# Patient Record
Sex: FEMALE | Race: BLACK OR AFRICAN AMERICAN | NOT HISPANIC OR LATINO | Employment: OTHER | URBAN - METROPOLITAN AREA
[De-identification: names, ages, dates, MRNs, and addresses within clinical notes are randomized per-mention and may not be internally consistent; named-entity substitution may affect disease eponyms.]

---

## 2018-10-02 ENCOUNTER — OFFICE VISIT (OUTPATIENT)
Dept: INTERNAL MEDICINE | Facility: CLINIC | Age: 68
End: 2018-10-02
Payer: MEDICARE

## 2018-10-02 ENCOUNTER — LAB VISIT (OUTPATIENT)
Dept: LAB | Facility: HOSPITAL | Age: 68
End: 2018-10-02
Attending: INTERNAL MEDICINE
Payer: MEDICARE

## 2018-10-02 VITALS
WEIGHT: 131.63 LBS | HEART RATE: 94 BPM | BODY MASS INDEX: 22.47 KG/M2 | DIASTOLIC BLOOD PRESSURE: 68 MMHG | HEIGHT: 64 IN | SYSTOLIC BLOOD PRESSURE: 130 MMHG | OXYGEN SATURATION: 98 %

## 2018-10-02 DIAGNOSIS — E78.00 PURE HYPERCHOLESTEROLEMIA: ICD-10-CM

## 2018-10-02 DIAGNOSIS — Z23 NEED FOR INFLUENZA VACCINATION: ICD-10-CM

## 2018-10-02 DIAGNOSIS — E55.9 VITAMIN D INSUFFICIENCY: ICD-10-CM

## 2018-10-02 DIAGNOSIS — Z00.00 ANNUAL PHYSICAL EXAM: ICD-10-CM

## 2018-10-02 DIAGNOSIS — G43.909 MIGRAINE SYNDROME: ICD-10-CM

## 2018-10-02 DIAGNOSIS — Z78.0 ASYMPTOMATIC POSTMENOPAUSAL ESTROGEN DEFICIENCY: ICD-10-CM

## 2018-10-02 DIAGNOSIS — Z12.31 BREAST CANCER SCREENING BY MAMMOGRAM: ICD-10-CM

## 2018-10-02 DIAGNOSIS — H04.123 CHRONIC DRYNESS OF BOTH EYES: ICD-10-CM

## 2018-10-02 DIAGNOSIS — Z12.11 COLON CANCER SCREENING: ICD-10-CM

## 2018-10-02 DIAGNOSIS — Z00.00 ANNUAL PHYSICAL EXAM: Primary | ICD-10-CM

## 2018-10-02 LAB
25(OH)D3+25(OH)D2 SERPL-MCNC: 59 NG/ML
ALBUMIN SERPL BCP-MCNC: 4 G/DL
ALP SERPL-CCNC: 127 U/L
ALT SERPL W/O P-5'-P-CCNC: 23 U/L
ANION GAP SERPL CALC-SCNC: 7 MMOL/L
AST SERPL-CCNC: 23 U/L
BILIRUB SERPL-MCNC: 0.4 MG/DL
BUN SERPL-MCNC: 7 MG/DL
CALCIUM SERPL-MCNC: 9.9 MG/DL
CHLORIDE SERPL-SCNC: 105 MMOL/L
CHOLEST SERPL-MCNC: 164 MG/DL
CHOLEST/HDLC SERPL: 3.4 {RATIO}
CO2 SERPL-SCNC: 27 MMOL/L
CREAT SERPL-MCNC: 1 MG/DL
EST. GFR  (AFRICAN AMERICAN): >60 ML/MIN/1.73 M^2
EST. GFR  (NON AFRICAN AMERICAN): 59.3 ML/MIN/1.73 M^2
GLUCOSE SERPL-MCNC: 116 MG/DL
HDLC SERPL-MCNC: 48 MG/DL
HDLC SERPL: 29.3 %
LDLC SERPL CALC-MCNC: 103.8 MG/DL
NONHDLC SERPL-MCNC: 116 MG/DL
POTASSIUM SERPL-SCNC: 4 MMOL/L
PROT SERPL-MCNC: 7.2 G/DL
SODIUM SERPL-SCNC: 139 MMOL/L
TRIGL SERPL-MCNC: 61 MG/DL

## 2018-10-02 PROCEDURE — 36415 COLL VENOUS BLD VENIPUNCTURE: CPT | Mod: PO

## 2018-10-02 PROCEDURE — 80061 LIPID PANEL: CPT

## 2018-10-02 PROCEDURE — 99999 PR PBB SHADOW E&M-NEW PATIENT-LVL V: CPT | Mod: PBBFAC,,, | Performed by: INTERNAL MEDICINE

## 2018-10-02 PROCEDURE — 99203 OFFICE O/P NEW LOW 30 MIN: CPT | Mod: 25,S$PBB,, | Performed by: INTERNAL MEDICINE

## 2018-10-02 PROCEDURE — 90662 IIV NO PRSV INCREASED AG IM: CPT | Mod: PBBFAC,PO

## 2018-10-02 PROCEDURE — 82306 VITAMIN D 25 HYDROXY: CPT

## 2018-10-02 PROCEDURE — 99205 OFFICE O/P NEW HI 60 MIN: CPT | Mod: PBBFAC,PO,25 | Performed by: INTERNAL MEDICINE

## 2018-10-02 PROCEDURE — G0008 ADMIN INFLUENZA VIRUS VAC: HCPCS | Mod: PBBFAC,PO

## 2018-10-02 PROCEDURE — 80053 COMPREHEN METABOLIC PANEL: CPT

## 2018-10-02 RX ORDER — ACETAMINOPHEN 500 MG
1 TABLET ORAL DAILY
COMMUNITY

## 2018-10-02 RX ORDER — DEXAMETHASONE 4 MG/1
4 TABLET ORAL
COMMUNITY
End: 2018-11-12 | Stop reason: SDUPTHER

## 2018-10-02 RX ORDER — VENLAFAXINE HYDROCHLORIDE 75 MG/1
75 CAPSULE, EXTENDED RELEASE ORAL DAILY
COMMUNITY
End: 2019-01-21 | Stop reason: SDUPTHER

## 2018-10-02 RX ORDER — VENLAFAXINE HYDROCHLORIDE 150 MG/1
75 CAPSULE, EXTENDED RELEASE ORAL DAILY
COMMUNITY
End: 2019-01-21 | Stop reason: SDUPTHER

## 2018-10-02 RX ORDER — ATORVASTATIN CALCIUM 40 MG/1
40 TABLET, FILM COATED ORAL DAILY
COMMUNITY
End: 2018-11-19 | Stop reason: SDUPTHER

## 2018-10-02 RX ORDER — FROVATRIPTAN SUCCINATE 2.5 MG/1
2.5 TABLET, FILM COATED ORAL
COMMUNITY
End: 2018-11-12 | Stop reason: SDUPTHER

## 2018-10-02 NOTE — PROGRESS NOTES
Portions of this note are generated with voice recognition software. Typographical errors may exist.       Patient Name:ABIOLA ACOSTA  Patient MRN:   [id number]    History of Present Illness   ================================================================  ABIOLA ACOSTA is a 68 y.o. female here for primary care visit for  Chief Complaint   Patient presents with    Establish Care       History reviewed. No pertinent past medical history.    History reviewed. No pertinent surgical history.    Review of patient's allergies indicates:   Allergen Reactions    Aspirin Other (See Comments)     Causes bruising         Current Outpatient Medications on File Prior to Visit   Medication Sig Dispense Refill    atorvastatin (LIPITOR) 40 MG tablet Take 40 mg by mouth once daily.      cholecalciferol, vitamin D3, (VITAMIN D3) 2,000 unit Cap Take 1 capsule by mouth once daily.      dexamethasone (DECADRON) 4 MG Tab Take 4 mg by mouth. Take 1 tablet every 8 hrs for headache. Max 3 days      frovatriptan (FROVA) 2.5 MG tablet Take 2.5 mg by mouth as needed for Migraine. If recurs, may repeat after 2 hours. Max of 3 tabs in 24 hours.      venlafaxine (EFFEXOR-XR) 150 MG Cp24 Take 75 mg by mouth once daily.      venlafaxine (EFFEXOR-XR) 75 MG 24 hr capsule Take 75 mg by mouth once daily.       No current facility-administered medications on file prior to visit.        History reviewed. No pertinent family history.    Social History     Socioeconomic History    Marital status: Single     Spouse name: Not on file    Number of children: Not on file    Years of education: Not on file    Highest education level: Not on file   Social Needs    Financial resource strain: Not on file    Food insecurity - worry: Not on file    Food insecurity - inability: Not on file    Transportation needs - medical: Not on file    Transportation needs - non-medical: Not on file   Occupational History    Not on file   Tobacco Use     Smoking status: Never Smoker    Smokeless tobacco: Never Used   Substance and Sexual Activity    Alcohol use: No     Frequency: Never    Drug use: No    Sexual activity: No   Other Topics Concern    Not on file   Social History Narrative    Not on file       Social History     Substance and Sexual Activity   Sexual Activity No         SUBJECTIVE:    The patient states that she get the majority of her primary care in [place] and at snf she decided to move closer to home in the [facility] where she is originally from.    She states that she is due for colonoscopy and mammogram this year.  Mammograms have previously been normal.  She has some densities but they have never required ultrasound or biopsy.  Colonoscopy had some polyps and she was told to repeat in about 5 years    The patient states that she has been evaluated by neurologist to help with recurring painful headache syndromes.  She has been diagnosed with migraine syndrome with frequent recurrences.  She states that she has been managed on both long-term suppressive therapy in addition to acute therapy.  She has been on about 225 mg of venlafaxine for some time to help to prevent frequent headaches.  The patient appears to be tolerating the medication well.  In addition she has a Triptan prescription and she has a dexamethasone prescription.  The patient states that she takes the dexamethasone very rarely but that it does help with at least 1 of her headaches syndromes.  She takes the dexamethasone potentially every 2-3 months and usually not completing the full prescription of 3 days of treatment.    The patient has a worrisome history of long bone fracture.  She states that as a result of some orthostatic dizziness that might of been a medication side effect she had a ground level fall at low velocity and suffered a fracture of 2 of her bones at the elbow on the right side.  The patient states that her last bone density evaluation was 5  years ago and she did not feel that this was an abnormal bone density.  She has taken vitamin-D supplementation for chronic vitamin D deficiency.    The patient suffers with chronic dry eye.  It has not been determine if there is a medication component to this.  She follows with optometry.    The patient is been on atorvastatin for many years.  She had difficulty with gaining weight after menopause and since then has never been able to continue to control her cholesterol with just diet and exercise.  She is very reluctant to continue the medicine and wants to take a holiday.        The patient states that she is not sexually active and states that in the  she did receive HIV screening and has not had sexual activity since her HIV screening.  She is reluctant to discuss whether she will become sexually active and the factors contributing to her current relationship status.  She plans to remain single.        Medications Reviewed and Updated    Past medical, family, and social histories were reviewed and updated.    Review of Systems negative unless otherwise noted in history of present illness-  ROS      General ROS: negative  Psychological ROS: negative  ENT ROS: negative  Endocrine ROS: Negative  Allergy and Immunology ROS: negative  Pulmonary ROS: Negative  Cardiovascular ROS: negative  Gastrointestinal ROS: negative  Genito-Urinary ROS: negative  Musculoskeletal ROS: negative  Neurological ROS: negative  Dermatological ROS: negative      Allergic:    Review of patient's allergies indicates:   Allergen Reactions    Aspirin Other (See Comments)     Causes bruising         OBJECTIVE:  BP: 130/68 Pulse: 94    Wt Readings from Last 3 Encounters:   10/02/18 59.7 kg (131 lb 9.8 oz)    Body mass index is 22.59 kg/m².  Previous Blood Pressure Readings :   BP Readings from Last 3 Encounters:   10/02/18 130/68       Physical Exam    GEN: healthy appearing  HEENT: sclera non-icteric, conjunctiva clear  CV: no  peripheral edema. Regular rate and rhythm. No murmurs.  No carotid bruits.  1+ dorsalis pedis pulses bilaterally.  PULM: breathing non-labored  ABD: supple. protuberant abdomen.   PSYCH: appropriate affect  MSK: able to rise from chair without assistance  SKIN: normal skin turgor      Pertinent Labs Reviewed           ASSESSMENT/PLAN:    Annual physical exam  -     Mammo Digital Screening Bilat with CAD; Standing  -     Comprehensive metabolic panel; Standing  -     Lipid panel; Standing  -     Vitamin D; Standing    Breast cancer screening by mammogram  -     Mammo Digital Screening Bilat with CAD; Standing    Pure hypercholesterolemia  -     Comprehensive metabolic panel; Standing  -     Lipid panel; Standing    Vitamin D insufficiency  -     Vitamin D; Standing    Colon cancer screening  -     Case request GI: COLONOSCOPY    Migraine syndrome  -     Ambulatory referral to Neurology    Chronic dryness of both eyes  -     Ambulatory referral to Optometry    Asymptomatic postmenopausal estrogen deficiency  -     DXA Bone Density Spine And Hip; Future; Expected date: 10/02/2018    Need for influenza vaccination  -     Influenza - High Dose (65+) (PF) (IM)          Future Appointments   Date Time Provider Department Center   10/12/2018  8:15 AM [facility] MAMMO1 [facility] MAMMO [facility]   10/12/2018  9:00 AM [facility] DEXA1 [facility] BMD [facility]   10/15/2018  8:00 AM Brock Donovan, [place] [facility] OPTOMTY [place]   11/12/2018  8:00 AM Sage Lebron MD [facility] NEURO [facility]   11/19/2018  8:15 AM LAB, [facility] [place]   4/2/2019  8:30 AM LAB, [facility] [place]   4/5/2019  8:20 AM Juaquin Alves MD [facility]  10/3/2018  12:39 PM

## 2018-10-02 NOTE — PATIENT INSTRUCTIONS
Recommendations for today    We can proceed with a holiday from atorvastatin/Lipitor however you need to be on your best behavior with regard to the diet and we need to check cholesterol again to see how things change off the medication.    Please contact my office when you have selected a local pharmacy so that we have clarity on where to send medication.  It is best to have 1 and only 1 pharmacy for your chronic medication.    Understanding Food and Cholesterol  Having a high cholesterol level puts you at risk for heart disease and other health problems. What you eat has a big effect on your bodys cholesterol level. Eating certain foods can raise your cholesterol. Other foods can help you lower it. Watching what you eat can help you get your cholesterol level under control.  Know which foods are high in saturated fat and trans fat  Foods high in saturated fat and trans fat can raise your LDL (bad) cholesterol. Its important to knowing which foods are high in these fats, and eat less of them. This can help you manage your cholesterol levels.  Foods high in these fats are:  · Animal products, including beef, lamb, pork, and poultry with skin on  · Cold cuts, early, sausage  · Creamy sauces and fatty gravies  · Cookies, donuts, muffins, and pastries  · Fried foods  · Shortening, butter, coconut oil, palm oil, cocoa butter, partially hydrogenated oils (read labels)  · High-fat dairy products such as whole milk, cheese, cream cheese, and ice cream  Better choices:  · Lean beef, skinless white-meat poultry, fish  · Tomato sauce, vegetable puree  · Dried fruit, bagels, bread with jam  · Baked, broiled, steamed, or roasted foods  · Soft (tub) margarine, canola oil, and olive oil in moderate amounts  · Low-fat or nonfat dairy products, such as 1% or fat-free milk, and reduced-fat cheese  Use fiber to help control cholesterol  Foods high in fiber can help you keep your cholesterol down. Good sources of fiber  are:  · Oats  · Barley  · Whole grains  · Beans  · Vegetables  · Cornmeal  · Popcorn  · Berries, apples, other fruits  Date Last Reviewed: 8/10/2015  © 7465-6415 [employer]. [address] [place] [address], [place] [address]. All rights reserved. This information is not intended as a substitute for professional medical care. Always follow your healthcare professional's instructions.

## 2018-10-02 NOTE — H&P (VIEW-ONLY)
Portions of this note are generated with voice recognition software. Typographical errors may exist.       Patient Name:ABIOLA ACOSTA  Patient MRN:   [id number]    History of Present Illness   ================================================================  ABIOLA ACOSTA is a 68 y.o. female here for primary care visit for  Chief Complaint   Patient presents with    Establish Care       History reviewed. No pertinent past medical history.    History reviewed. No pertinent surgical history.    Review of patient's allergies indicates:   Allergen Reactions    Aspirin Other (See Comments)     Causes bruising         Current Outpatient Medications on File Prior to Visit   Medication Sig Dispense Refill    atorvastatin (LIPITOR) 40 MG tablet Take 40 mg by mouth once daily.      cholecalciferol, vitamin D3, (VITAMIN D3) 2,000 unit Cap Take 1 capsule by mouth once daily.      dexamethasone (DECADRON) 4 MG Tab Take 4 mg by mouth. Take 1 tablet every 8 hrs for headache. Max 3 days      frovatriptan (FROVA) 2.5 MG tablet Take 2.5 mg by mouth as needed for Migraine. If recurs, may repeat after 2 hours. Max of 3 tabs in 24 hours.      venlafaxine (EFFEXOR-XR) 150 MG Cp24 Take 75 mg by mouth once daily.      venlafaxine (EFFEXOR-XR) 75 MG 24 hr capsule Take 75 mg by mouth once daily.       No current facility-administered medications on file prior to visit.        History reviewed. No pertinent family history.    Social History     Socioeconomic History    Marital status: Single     Spouse name: Not on file    Number of children: Not on file    Years of education: Not on file    Highest education level: Not on file   Social Needs    Financial resource strain: Not on file    Food insecurity - worry: Not on file    Food insecurity - inability: Not on file    Transportation needs - medical: Not on file    Transportation needs - non-medical: Not on file   Occupational History    Not on file   Tobacco Use     Smoking status: Never Smoker    Smokeless tobacco: Never Used   Substance and Sexual Activity    Alcohol use: No     Frequency: Never    Drug use: No    Sexual activity: No   Other Topics Concern    Not on file   Social History Narrative    Not on file       Social History     Substance and Sexual Activity   Sexual Activity No         SUBJECTIVE:    The patient states that she get the majority of her primary care in [place] and at jail she decided to move closer to home in the [facility] where she is originally from.    She states that she is due for colonoscopy and mammogram this year.  Mammograms have previously been normal.  She has some densities but they have never required ultrasound or biopsy.  Colonoscopy had some polyps and she was told to repeat in about 5 years    The patient states that she has been evaluated by neurologist to help with recurring painful headache syndromes.  She has been diagnosed with migraine syndrome with frequent recurrences.  She states that she has been managed on both long-term suppressive therapy in addition to acute therapy.  She has been on about 225 mg of venlafaxine for some time to help to prevent frequent headaches.  The patient appears to be tolerating the medication well.  In addition she has a Triptan prescription and she has a dexamethasone prescription.  The patient states that she takes the dexamethasone very rarely but that it does help with at least 1 of her headaches syndromes.  She takes the dexamethasone potentially every 2-3 months and usually not completing the full prescription of 3 days of treatment.    The patient has a worrisome history of long bone fracture.  She states that as a result of some orthostatic dizziness that might of been a medication side effect she had a ground level fall at low velocity and suffered a fracture of 2 of her bones at the elbow on the right side.  The patient states that her last bone density evaluation was 5  years ago and she did not feel that this was an abnormal bone density.  She has taken vitamin-D supplementation for chronic vitamin D deficiency.    The patient suffers with chronic dry eye.  It has not been determine if there is a medication component to this.  She follows with optometry.    The patient is been on atorvastatin for many years.  She had difficulty with gaining weight after menopause and since then has never been able to continue to control her cholesterol with just diet and exercise.  She is very reluctant to continue the medicine and wants to take a holiday.        The patient states that she is not sexually active and states that in the  she did receive HIV screening and has not had sexual activity since her HIV screening.  She is reluctant to discuss whether she will become sexually active and the factors contributing to her current relationship status.  She plans to remain single.        Medications Reviewed and Updated    Past medical, family, and social histories were reviewed and updated.    Review of Systems negative unless otherwise noted in history of present illness-  ROS      General ROS: negative  Psychological ROS: negative  ENT ROS: negative  Endocrine ROS: Negative  Allergy and Immunology ROS: negative  Pulmonary ROS: Negative  Cardiovascular ROS: negative  Gastrointestinal ROS: negative  Genito-Urinary ROS: negative  Musculoskeletal ROS: negative  Neurological ROS: negative  Dermatological ROS: negative      Allergic:    Review of patient's allergies indicates:   Allergen Reactions    Aspirin Other (See Comments)     Causes bruising         OBJECTIVE:  BP: 130/68 Pulse: 94    Wt Readings from Last 3 Encounters:   10/02/18 59.7 kg (131 lb 9.8 oz)    Body mass index is 22.59 kg/m².  Previous Blood Pressure Readings :   BP Readings from Last 3 Encounters:   10/02/18 130/68       Physical Exam    GEN: healthy appearing  HEENT: sclera non-icteric, conjunctiva clear  CV: no  peripheral edema. Regular rate and rhythm. No murmurs.  No carotid bruits.  1+ dorsalis pedis pulses bilaterally.  PULM: breathing non-labored  ABD: supple. protuberant abdomen.   PSYCH: appropriate affect  MSK: able to rise from chair without assistance  SKIN: normal skin turgor      Pertinent Labs Reviewed           ASSESSMENT/PLAN:    Annual physical exam  -     Mammo Digital Screening Bilat with CAD; Standing  -     Comprehensive metabolic panel; Standing  -     Lipid panel; Standing  -     Vitamin D; Standing    Breast cancer screening by mammogram  -     Mammo Digital Screening Bilat with CAD; Standing    Pure hypercholesterolemia  -     Comprehensive metabolic panel; Standing  -     Lipid panel; Standing    Vitamin D insufficiency  -     Vitamin D; Standing    Colon cancer screening  -     Case request GI: COLONOSCOPY    Migraine syndrome  -     Ambulatory referral to Neurology    Chronic dryness of both eyes  -     Ambulatory referral to Optometry    Asymptomatic postmenopausal estrogen deficiency  -     DXA Bone Density Spine And Hip; Future; Expected date: 10/02/2018    Need for influenza vaccination  -     Influenza - High Dose (65+) (PF) (IM)          Future Appointments   Date Time Provider Department Center   10/12/2018  8:15 AM [facility] MAMMO1 [facility] MAMMO [place] [facility]   10/12/2018  9:00 AM [facility] DEXA1 [facility] BMD [facility]   10/15/2018  8:00 AM Brock Donovan, [place] [facility] OPTOMTY [place]   11/12/2018  8:00 AM Sage Lebron MD [facility] NEURO [facility]   11/19/2018  8:15 AM LAB, [facility] [place]   4/2/2019  8:30 AM LAB, [facility] [place]   4/5/2019  8:20 AM Juaquin Alves MD [facility] Maryse Alves  10/3/2018  12:39 PM

## 2018-10-05 ENCOUNTER — TELEPHONE (OUTPATIENT)
Dept: INTERNAL MEDICINE | Facility: CLINIC | Age: 68
End: 2018-10-05

## 2018-10-05 NOTE — TELEPHONE ENCOUNTER
Spoke with pt to inform of process to get scheduled for a colonoscopy. Pt gave information on old doctor. Pt requests a call when records are received. Understanding voiced.

## 2018-10-05 NOTE — TELEPHONE ENCOUNTER
----- Message from Sheyla Muñoz sent at 10/5/2018  8:35 AM CDT -----  Contact: [phone]/self  Patient requesting to speak with you regarding getting a medical releases form. Please advise.

## 2018-10-08 ENCOUNTER — TELEPHONE (OUTPATIENT)
Dept: GASTROENTEROLOGY | Facility: CLINIC | Age: 68
End: 2018-10-08

## 2018-10-09 ENCOUNTER — TELEPHONE (OUTPATIENT)
Dept: INTERNAL MEDICINE | Facility: CLINIC | Age: 68
End: 2018-10-09

## 2018-10-09 NOTE — TELEPHONE ENCOUNTER
----- Message from Mabel Young sent at 10/9/2018  3:25 PM CDT -----  Contact: self/[phone]  She is returning Minnie's call.

## 2018-10-09 NOTE — TELEPHONE ENCOUNTER
Spoke with pt to inform that I didn't call that she in fact missed the call from the colonoscopy dept. Pt was given the number to call. Understanding voiced.

## 2018-10-12 ENCOUNTER — HOSPITAL ENCOUNTER (OUTPATIENT)
Dept: RADIOLOGY | Facility: HOSPITAL | Age: 68
Discharge: HOME | End: 2018-10-12
Attending: INTERNAL MEDICINE
Payer: MEDICARE

## 2018-10-12 ENCOUNTER — TELEPHONE (OUTPATIENT)
Dept: GASTROENTEROLOGY | Facility: CLINIC | Age: 68
End: 2018-10-12

## 2018-10-12 DIAGNOSIS — Z12.31 BREAST CANCER SCREENING BY MAMMOGRAM: ICD-10-CM

## 2018-10-12 DIAGNOSIS — Z00.00 ANNUAL PHYSICAL EXAM: ICD-10-CM

## 2018-10-12 DIAGNOSIS — Z78.0 ASYMPTOMATIC POSTMENOPAUSAL ESTROGEN DEFICIENCY: ICD-10-CM

## 2018-10-12 PROCEDURE — 77067 SCR MAMMO BI INCL CAD: CPT | Mod: 26,,, | Performed by: RADIOLOGY

## 2018-10-12 PROCEDURE — 77080 DXA BONE DENSITY AXIAL: CPT | Mod: 26,,, | Performed by: RADIOLOGY

## 2018-10-12 PROCEDURE — 77080 DXA BONE DENSITY AXIAL: CPT | Mod: TC

## 2018-10-12 PROCEDURE — 77067 SCR MAMMO BI INCL CAD: CPT | Mod: TC

## 2018-10-12 NOTE — TELEPHONE ENCOUNTER
Attempted to return patient's call about scheduling a Colonoscopy. Left patient a message to give office a call back.

## 2018-10-15 ENCOUNTER — TELEPHONE (OUTPATIENT)
Dept: GASTROENTEROLOGY | Facility: CLINIC | Age: 68
End: 2018-10-15

## 2018-10-15 ENCOUNTER — TELEPHONE (OUTPATIENT)
Dept: INTERNAL MEDICINE | Facility: CLINIC | Age: 68
End: 2018-10-15

## 2018-10-15 ENCOUNTER — OFFICE VISIT (OUTPATIENT)
Dept: OPTOMETRY | Facility: CLINIC | Age: 68
End: 2018-10-15
Payer: MEDICARE

## 2018-10-15 DIAGNOSIS — H04.123 BILATERAL DRY EYES: Primary | ICD-10-CM

## 2018-10-15 DIAGNOSIS — H25.13 NUCLEAR SCLEROSIS OF BOTH EYES: ICD-10-CM

## 2018-10-15 PROCEDURE — 99212 OFFICE O/P EST SF 10 MIN: CPT | Mod: PBBFAC,PO | Performed by: OPTOMETRIST

## 2018-10-15 PROCEDURE — 99999 PR PBB SHADOW E&M-EST. PATIENT-LVL II: CPT | Mod: PBBFAC,,, | Performed by: OPTOMETRIST

## 2018-10-15 PROCEDURE — 92004 COMPRE OPH EXAM NEW PT 1/>: CPT | Mod: S$PBB,,, | Performed by: OPTOMETRIST

## 2018-10-15 RX ORDER — FLUOROMETHOLONE 1 MG/ML
1 SUSPENSION/ DROPS OPHTHALMIC 4 TIMES DAILY
Qty: 5 ML | Refills: 0 | Status: SHIPPED | OUTPATIENT
Start: 2018-10-15 | End: 2018-10-25

## 2018-10-15 NOTE — TELEPHONE ENCOUNTER
Spoke with pt to inform that once the doctor goes over the results of her bone scan she will get more information on the next steps.

## 2018-10-15 NOTE — PROGRESS NOTES
AROLDO DOMINGUEZ about 6 months ago elsewhere.  Patient recently moved here and wanted   to establish care.  Patient states she was being followed for dry eyes and   is using PF clear eyes up to 5 times a day, seems to be working.  Glasses   about 1 yr. Old, still seem fine. Patient defers refraction today. Patient   states she has had ptosis repair OU about 5 yrs. Ago with some resdual   ptosis OD, but doesn't wish to pursue surgery.    Last edited by Ashley Ordonez on 10/15/2018  8:20 AM. (History)            Assessment /Plan     For exam results, see Encounter Report.    Bilateral dry eyes  -     fluorometholone 0.1% (FML) 0.1 % DrpS; Place 1 drop into both eyes 4 (four) times daily. for 10 days  Dispense: 5 mL; Refill: 0    Nuclear sclerosis of both eyes      1. Having minor flare up. Start FML drops 1 drop 4x/day x 7-10 days. If better can resume tears, if no better can call back.   2. Educated pt on presence of cataracts and effects on vision. No surgery at this time. Recheck in one year.

## 2018-10-15 NOTE — TELEPHONE ENCOUNTER
----- Message from Merary Mora sent at 10/15/2018  1:05 PM CDT -----  Contact: [phone]/self  Patient is requesting a call back regarding seeing an orthopedic doctor. She is in pain. Thanks

## 2018-10-17 ENCOUNTER — TELEPHONE (OUTPATIENT)
Dept: GASTROENTEROLOGY | Facility: CLINIC | Age: 68
End: 2018-10-17

## 2018-10-17 NOTE — TELEPHONE ENCOUNTER
Colonoscopy Referral   Referring Physician: Dr. Alves  Date:10/24/2018   Reason for Referral: History of Colon Polyps  Family History of: Colon Cancer  Colon polyp:  None  Relationship/Age of Onset: None  Colon cancer: YES  Relationship/Age of Onset: FATHER    Hemoccults Done:NO    Iron deficient:NO   On Blood Thinner: NO  Valvular heart disease/valve replacement:NO    Anemia Present: NO  On NSAID: NO  Lung disease: NO  Kidney disease:NO   Hx of polyps:YES  Hx of colon cancer: NO  Previous colon evalations: YES      When:   Where:   Pertinent symptoms:     Patient was scheduled for colonoscopy on 10/24/2018 with Dr. Bradshaw at [facility]. Golytely instructions were reviewed with patient.

## 2018-10-19 DIAGNOSIS — Z11.59 NEED FOR HEPATITIS C SCREENING TEST: ICD-10-CM

## 2018-10-22 ENCOUNTER — TELEPHONE (OUTPATIENT)
Dept: GASTROENTEROLOGY | Facility: CLINIC | Age: 68
End: 2018-10-22

## 2018-10-22 ENCOUNTER — TELEPHONE (OUTPATIENT)
Dept: ENDOSCOPY | Facility: HOSPITAL | Age: 68
End: 2018-10-22

## 2018-10-22 NOTE — TELEPHONE ENCOUNTER
Left message instructing patient to call dept @ [phone] between 8am-4pm.    Arrival time given @ 1200   2nd portion of prep @ 0600  Last clears NPO @ 0900

## 2018-10-22 NOTE — TELEPHONE ENCOUNTER
----- Message from Isadora Fletcher sent at 10/22/2018  8:07 AM CDT -----  Contact: [phone]/self  Patient has questions concerning her colonoscopy prep medication   Please call and advise

## 2018-10-22 NOTE — TELEPHONE ENCOUNTER
Spoke with patient about her concerns with her prep. Staff went over prep instructions with patient and also answered all patient's questions that was asked.

## 2018-10-23 ENCOUNTER — TELEPHONE (OUTPATIENT)
Dept: ENDOSCOPY | Facility: HOSPITAL | Age: 68
End: 2018-10-23

## 2018-10-23 NOTE — TELEPHONE ENCOUNTER
Spoke with patient about arrival time @.  . 1200    Prep instructions reviewed: the day before the procedure, follow a clear liquid diet all day, then start the first 1/2 of prep at 5pm and take 2nd 1/2 of prep @  0600    .  Pt must be completely NPO when prep completed @  0700        .    Medications: Do not take Insulin or oral diabetic medications the day of the procedure.  Take as prescribed: heart, seizure and blood pressure medication in the morning with a sip of water (less than an ounce).  Take any breathing medications and bring inhalers to hospital with you Leave all valuables and jewelry at home.     Wear comfortable clothes to procedure to change into hospital gown You cannot drive for 24 hours after your procedure because you will receive sedation for your procedure to make you comfortable.  A ride must be provided at discharge.     Patient was upset that she had not been called a week ago and it is difficult to plan. Patient informed that we are working on making a better patient experience with regards to our process. Patient stated that she had one done in [place] and had no problems there.  Nurse proceeded to apologize, patient HUNG phone up in nurse's face..  This was witness by Sanjay dsouza tech who was at the desk at time .

## 2018-10-24 ENCOUNTER — HOSPITAL ENCOUNTER (OUTPATIENT)
Facility: HOSPITAL | Age: 68
Discharge: HOME | End: 2018-10-24
Attending: INTERNAL MEDICINE | Admitting: INTERNAL MEDICINE
Payer: MEDICARE

## 2018-10-24 ENCOUNTER — ANESTHESIA EVENT (OUTPATIENT)
Dept: ENDOSCOPY | Facility: HOSPITAL | Age: 68
End: 2018-10-24
Payer: MEDICARE

## 2018-10-24 ENCOUNTER — ANESTHESIA (OUTPATIENT)
Dept: ENDOSCOPY | Facility: HOSPITAL | Age: 68
End: 2018-10-24
Payer: MEDICARE

## 2018-10-24 DIAGNOSIS — Z12.11 SCREENING FOR COLON CANCER: ICD-10-CM

## 2018-10-24 PROCEDURE — 37000009 HC ANESTHESIA EA ADD 15 MINS: Performed by: INTERNAL MEDICINE

## 2018-10-24 PROCEDURE — 88305 TISSUE EXAM BY PATHOLOGIST: CPT | Mod: 26,,, | Performed by: PATHOLOGY

## 2018-10-24 PROCEDURE — 63600175 PHARM REV CODE 636 W HCPCS: Performed by: NURSE ANESTHETIST, CERTIFIED REGISTERED

## 2018-10-24 PROCEDURE — 27201012 HC FORCEPS, HOT/COLD, DISP: Performed by: INTERNAL MEDICINE

## 2018-10-24 PROCEDURE — 45380 COLONOSCOPY AND BIOPSY: CPT | Mod: PT,,, | Performed by: INTERNAL MEDICINE

## 2018-10-24 PROCEDURE — 37000008 HC ANESTHESIA 1ST 15 MINUTES: Performed by: INTERNAL MEDICINE

## 2018-10-24 PROCEDURE — 25000003 PHARM REV CODE 250: Performed by: INTERNAL MEDICINE

## 2018-10-24 PROCEDURE — 88305 TISSUE EXAM BY PATHOLOGIST: CPT | Performed by: PATHOLOGY

## 2018-10-24 PROCEDURE — 45380 COLONOSCOPY AND BIOPSY: CPT | Performed by: INTERNAL MEDICINE

## 2018-10-24 RX ORDER — PROPOFOL 10 MG/ML
VIAL (ML) INTRAVENOUS
Status: DISCONTINUED | OUTPATIENT
Start: 2018-10-24 | End: 2018-10-24

## 2018-10-24 RX ORDER — SODIUM CHLORIDE 9 MG/ML
INJECTION, SOLUTION INTRAVENOUS CONTINUOUS
Status: DISCONTINUED | OUTPATIENT
Start: 2018-10-24 | End: 2019-04-10

## 2018-10-24 RX ORDER — LIDOCAINE HCL/PF 100 MG/5ML
SYRINGE (ML) INTRAVENOUS
Status: DISCONTINUED | OUTPATIENT
Start: 2018-10-24 | End: 2018-10-24

## 2018-10-24 RX ORDER — SODIUM CHLORIDE 0.9 % (FLUSH) 0.9 %
3 SYRINGE (ML) INJECTION
Status: DISCONTINUED | OUTPATIENT
Start: 2018-10-24 | End: 2019-04-10

## 2018-10-24 RX ORDER — PROPOFOL 10 MG/ML
VIAL (ML) INTRAVENOUS CONTINUOUS PRN
Status: DISCONTINUED | OUTPATIENT
Start: 2018-10-24 | End: 2018-10-24

## 2018-10-24 RX ADMIN — LIDOCAINE HYDROCHLORIDE 50 MG: 20 INJECTION, SOLUTION INTRAVENOUS at 01:10

## 2018-10-24 RX ADMIN — SODIUM CHLORIDE: 0.9 INJECTION, SOLUTION INTRAVENOUS at 12:10

## 2018-10-24 RX ADMIN — PROPOFOL 100 MG: 10 INJECTION, EMULSION INTRAVENOUS at 01:10

## 2018-10-24 RX ADMIN — PROPOFOL 150 MCG/KG/MIN: 10 INJECTION, EMULSION INTRAVENOUS at 01:10

## 2018-10-24 NOTE — ANESTHESIA PREPROCEDURE EVALUATION
10/24/2018  Maddy Lopez is a 68 y.o., female for colonoscopy under MAC    Past Medical History:   Diagnosis Date    Arthritis     Hypertension          Anesthesia Evaluation    I have reviewed the Patient Summary Reports.    I have reviewed the Nursing Notes.   I have reviewed the Medications.     Review of Systems  Social:  Non-Smoker, No Alcohol Use        Physical Exam  General:  Well nourished    Airway/Jaw/Neck:  Airway Findings: Mallampati: II      Chest/Lungs:  Chest/Lungs Clear    Heart/Vascular:  Heart Findings: Normal            Anesthesia Plan  Type of Anesthesia, risks & benefits discussed:  Anesthesia Type:  MAC  Patient's Preference:   Intra-op Monitoring Plan:   Intra-op Monitoring Plan Comments:   Post Op Pain Control Plan:   Post Op Pain Control Plan Comments:   Induction:    Beta Blocker:  Patient is not currently on a Beta-Blocker (No further documentation required).       Informed Consent: Patient understands risks and agrees with Anesthesia plan.  Questions answered. Anesthesia consent signed with patient.  ASA Score: 2     Day of Surgery Review of History & Physical:            Ready For Surgery From Anesthesia Perspective.

## 2018-10-24 NOTE — H&P
[facility]  Gastroenterology  H&P    Patient Name: Maddy Lopez  MRN: [id number]  Admission Date: 10/24/2018  Code Status: Full Code    Attending Provider: Natty Bradshaw MD   Primary Care Physician: Juaquin Alves MD  Principal Problem:<principal problem not specified>    Subjective:     History of Present Illness:  Patient requesting colonoscopy.  Patient denies any abdominal pain, weight loss or blood in her stool.  There is a  family history of colon cancer- dad at age 70 years.      Past Medical History:   Diagnosis Date    Arthritis     Hypertension        Past Surgical History:   Procedure Laterality Date    LEVATOR DEHISCENCE Bilateral 2013       Review of patient's allergies indicates:   Allergen Reactions    Aspirin Other (See Comments)     Causes bruising       Family History     Problem Relation (Age of Onset)    Blindness Mother, Father    Cancer Father    Diabetes Mother    Glaucoma Father    Hypertension Mother    Macular degeneration Mother        Tobacco Use    Smoking status: Never Smoker    Smokeless tobacco: Never Used   Substance and Sexual Activity    Alcohol use: No     Frequency: Never    Drug use: No    Sexual activity: No     Review of Systems   Constitutional: Negative for appetite change.   HENT: Negative for trouble swallowing.    Eyes: Negative for photophobia.   Respiratory: Negative for cough and shortness of breath.    Cardiovascular: Negative for palpitations.   Gastrointestinal:        See HPI for details   Genitourinary: Negative for frequency and hematuria.   Skin: Negative for rash.   Neurological: Negative for weakness and headaches.   Psychiatric/Behavioral: Negative for behavioral problems and suicidal ideas.     Objective:     Vital Signs (Most Recent):  Temp: 98.1 °F (36.7 °C) (10/24/18 1157)  Pulse: 90 (10/24/18 1157)  Resp: 18 (10/24/18 1157)  BP: 127/70 (10/24/18 1157)  SpO2: 100 % (10/24/18 1157) Vital Signs (24h  Range):  Temp:  [98.1 °F (36.7 °C)] 98.1 °F (36.7 °C)  Pulse:  [90] 90  Resp:  [18] 18  SpO2:  [100 %] 100 %  BP: (127)/(70) 127/70     Weight: 59 kg (130 lb) (10/24/18 1152)  Body mass index is 22.31 kg/m².    No intake or output data in the 24 hours ending 10/24/18 1301    Lines/Drains/Airways     Peripheral Intravenous Line                 Peripheral IV - Single Lumen 10/24/18 1204 Right Forearm less than 1 day                Physical Exam   Eyes: Pupils are equal, round, and reactive to light.   Neck: No thyromegaly present.   Cardiovascular: Normal rate, regular rhythm and normal heart sounds.   Pulmonary/Chest: Effort normal and breath sounds normal.   Abdominal: Soft. She exhibits no mass. There is no tenderness. There is no rebound and no guarding.   Musculoskeletal: She exhibits no tenderness.   Psychiatric: Her behavior is normal. Thought content normal.             Assessment/Plan:     Active Diagnoses:    Diagnosis Date Noted POA    Family history of colon cancer [Z12.11] 10/24/2018 Not Applicable      Problems Resolved During this Admission:     Colonoscopy today      Natty Bradshaw MD  Gastroenterology  [facility]

## 2018-10-24 NOTE — INTERVAL H&P NOTE
The patient has been examined and the H&P has been reviewed:    I concur with the findings and no changes have occurred since H&P was written.    Anesthesia/Surgery risks, benefits and alternative options discussed and understood by patient/family.          Active Hospital Problems    Diagnosis  POA    Screening for colon cancer [Z12.11]  Not Applicable      Resolved Hospital Problems   No resolved problems to display.

## 2018-10-24 NOTE — TRANSFER OF CARE
"Anesthesia Transfer of Care Note    Patient: Maddy Lopez    Procedure(s) Performed: Procedure(s) (LRB):  COLONOSCOPY/Golytely (N/A)    Patient location: PACU    Anesthesia Type: general    Transport from OR: Transported from OR on room air with adequate spontaneous ventilation    Post pain: adequate analgesia    Post assessment: no apparent anesthetic complications and tolerated procedure well    Post vital signs: stable    Level of consciousness: awake, oriented and alert    Nausea/Vomiting: no nausea/vomiting    Complications: none    Transfer of care protocol was followed      Last vitals:   Visit Vitals  /70 (Patient Position: Lying)   Pulse 90   Temp 36.7 °C (98.1 °F) (Temporal)   Resp 18   Ht 5' 4" (1.626 m)   Wt 59 kg (130 lb)   LMP  (LMP Unknown)   SpO2 100%   Breastfeeding? No   BMI 22.31 kg/m²     "

## 2018-10-24 NOTE — [employer] PATIENT INSTRUCTIONS
Discharge Summary/Instructions after an Endoscopic Procedure  Patient Name: Maddy Lopez  Patient MRN: [id number]  Patient YOB: 1950  Wednesday, October 24, 2018  Natty Bradshaw MD  RESTRICTIONS:  During your procedure today, you received medications for sedation.  These   medications may affect your judgment, balance and coordination.  Therefore,   for 24 hours, you have the following restrictions:   - DO NOT drive a car, operate machinery, make legal/financial decisions,   sign important papers or drink alcohol.    ACTIVITY:  Today: no heavy lifting, straining or running due to procedural   sedation/anesthesia.  The following day: return to full activity including work.  DIET:  Eat and drink normally unless instructed otherwise.     TREATMENT FOR COMMON SIDE EFFECTS:  - Mild abdominal pain, nausea, belching, bloating or excessive gas:  rest,   eat lightly and use a heating pad.  - Sore Throat: treat with throat lozenges and/or gargle with warm salt   water.  - Because air was used during the procedure, expelling large amounts of air   from your rectum or belching is normal.  - If a bowel prep was taken, you may not have a bowel movement for 1-3 days.    This is normal.  SYMPTOMS TO WATCH FOR AND REPORT TO YOUR PHYSICIAN:  1. Abdominal pain or bloating, other than gas cramps.  2. Chest pain.  3. Back pain.  4. Signs of infection such as: chills or fever occurring within 24 hours   after the procedure.  5. Rectal bleeding, which would show as bright red, maroon, or black stools.   (A tablespoon of blood from the rectum is not serious, especially if   hemorrhoids are present.)  6. Vomiting.  7. Weakness or dizziness.  GO DIRECTLY TO THE NEAREST EMERGENCY ROOM IF YOU HAVE ANY OF THE FOLLOWING:      Difficulty breathing              Chills and/or fever over 101 F   Persistent vomiting and/or vomiting blood   Severe abdominal pain   Severe chest pain   Black, tarry stools   Bleeding- more than one  tablespoon   Any other symptom or condition that you feel may need urgent attention  Your doctor recommends these additional instructions:  If any biopsies were taken, your doctors clinic will contact you in 1 to 2   weeks with any results.  - Discharge patient to home.   - Repeat colonoscopy in 5 years for surveillance.  For questions, problems or results please call your physician - Natty Bradshaw MD at Work:  [phone].  EMERGENCY PHONE NUMBER: [phone],  LAB RESULTS: [phone]  IF A COMPLICATION OR EMERGENCY SITUATION ARISES AND YOU ARE UNABLE TO REACH   YOUR PHYSICIAN - GO DIRECTLY TO THE EMERGENCY ROOM.  Natty Bradshaw MD  10/24/2018 1:29:51 PM  This report has been verified and signed electronically.  [employer]

## 2018-10-25 VITALS
HEIGHT: 64 IN | WEIGHT: 130 LBS | HEART RATE: 88 BPM | SYSTOLIC BLOOD PRESSURE: 163 MMHG | TEMPERATURE: 98 F | OXYGEN SATURATION: 100 % | RESPIRATION RATE: 18 BRPM | BODY MASS INDEX: 22.2 KG/M2 | DIASTOLIC BLOOD PRESSURE: 89 MMHG

## 2018-11-09 ENCOUNTER — TELEPHONE (OUTPATIENT)
Dept: FAMILY MEDICINE | Facility: CLINIC | Age: 68
End: 2018-11-09

## 2018-11-09 NOTE — TELEPHONE ENCOUNTER
Spoke with pt to give information on our process of giving lab results and what  The process is for obtaining a referral. Pt was informed that a message will be sent to the doctor. And she will get a call back. Understanding voiced.

## 2018-11-09 NOTE — TELEPHONE ENCOUNTER
----- Message from Reece Acuna sent at 11/9/2018  4:09 PM CST -----  Contact: [phone]  Patient advised she need a referral to see an orthopedic. Please call.

## 2018-11-12 ENCOUNTER — OFFICE VISIT (OUTPATIENT)
Dept: NEUROLOGY | Facility: CLINIC | Age: 68
End: 2018-11-12
Payer: MEDICARE

## 2018-11-12 VITALS
SYSTOLIC BLOOD PRESSURE: 130 MMHG | HEART RATE: 97 BPM | DIASTOLIC BLOOD PRESSURE: 82 MMHG | BODY MASS INDEX: 21.71 KG/M2 | HEIGHT: 64 IN | WEIGHT: 127.19 LBS

## 2018-11-12 DIAGNOSIS — G43.109 MIGRAINE WITH AURA AND WITHOUT STATUS MIGRAINOSUS, NOT INTRACTABLE: ICD-10-CM

## 2018-11-12 DIAGNOSIS — E78.5 HYPERLIPIDEMIA, UNSPECIFIED HYPERLIPIDEMIA TYPE: ICD-10-CM

## 2018-11-12 PROCEDURE — 99213 OFFICE O/P EST LOW 20 MIN: CPT | Mod: PBBFAC,PO | Performed by: PSYCHIATRY & NEUROLOGY

## 2018-11-12 PROCEDURE — 99999 PR PBB SHADOW E&M-EST. PATIENT-LVL III: CPT | Mod: PBBFAC,,, | Performed by: PSYCHIATRY & NEUROLOGY

## 2018-11-12 PROCEDURE — 99204 OFFICE O/P NEW MOD 45 MIN: CPT | Mod: S$PBB,,, | Performed by: PSYCHIATRY & NEUROLOGY

## 2018-11-12 RX ORDER — DEXAMETHASONE 4 MG/1
4 TABLET ORAL DAILY
Qty: 30 TABLET | Refills: 0 | Status: SHIPPED | OUTPATIENT
Start: 2018-11-12 | End: 2019-02-13 | Stop reason: ALTCHOICE

## 2018-11-12 RX ORDER — FROVATRIPTAN SUCCINATE 2.5 MG/1
2.5 TABLET, FILM COATED ORAL
Qty: 12 TABLET | Refills: 5 | Status: SHIPPED | OUTPATIENT
Start: 2018-11-12 | End: 2019-04-22 | Stop reason: SDUPTHER

## 2018-11-12 NOTE — PROGRESS NOTES
[facility] NEUROLOGY  [facility]    Date: 11/12/18  Patient Name: Maddy Lopez   MRN: [id number]   PCP: Juaquin Alves  Referring Provider: Juaquin Alves*    Assessment:   Maddy Lopez is a 68 y.o. female presenting to establish care for migraine headaches, though per history may be more consistent with tension headache.  Will continue current regimen with no changes.    Plan:     Problem List Items Addressed This Visit        Neuro    Migraine headache with aura    Current Assessment & Plan     -- continue current effexor  -- continue current frova  -- continue current dexamethasone             Cardiac/Vascular    Hyperlipidemia    Overview     On atorvastatin             Sage Lebron MD  [facility]   Department of Neurology    Patient note was created using MModal Dictation.  Any errors in syntax or even information may not have been identified and edited on initial review prior to signing this note.  Subjective:   Patient seen in consultation at the request of Juaquin Alves* for the evaluation of migraine headaches. A copy of this note will be sent to the referring physician.      HPI:   Ms. Maddy Lopez is a 68 y.o. female presenting to establish care for migraine headaches per review of records from Dr. Alves.  The patient recently returned to [place] from [place].  She reports that she has suffered from migraines from decades.  She describes her headaches as typically right-sided and lasting 3 days.  She describes a numbing pain but denies associated photophobia, phonophobia, nausea, or vomiting.  She states that her triggers or changes in the weather, bright sunshine, getting overheated, and reddish is.  She states that getting outside fresh air continue clear help her headaches.  She uses Frova approximately 5 times per month and uses a single Decadron tab approximately twice per month for intractable headaches.   She is happy with her current level of headache control.  She denies any side effects. She states she has undergone a brain scan years ago was normal.    PAST MEDICAL HISTORY:  Past Medical History:   Diagnosis Date    Arthritis     Hypertension        PAST SURGICAL HISTORY:  Past Surgical History:   Procedure Laterality Date    COLONOSCOPY N/A 10/24/2018    Procedure: COLONOSCOPY/Golytely;  Surgeon: Natty Bradshaw MD;  Location: [facility] ENDO;  Service: Endoscopy;  Laterality: N/A;    COLONOSCOPY/Golytely N/A 10/24/2018    Performed by Natty Bradshaw MD at [facility] ENDO    LEVATOR DEHISCENCE Bilateral 2013       CURRENT MEDS:  Current Outpatient Medications   Medication Sig Dispense Refill    atorvastatin (LIPITOR) 40 MG tablet Take 40 mg by mouth once daily.      cholecalciferol, vitamin D3, (VITAMIN D3) 2,000 unit Cap Take 1 capsule by mouth once daily.      dexamethasone (DECADRON) 4 MG Tab Take 1 tablet (4 mg total) by mouth once daily. Take 1 tablet for severe headache. DO NOT EXCEED 2 TABS/WEEK 30 tablet 0    frovatriptan (FROVA) 2.5 MG tablet Take 1 tablet (2.5 mg total) by mouth as needed for Migraine. If recurs, may repeat once after 2 hours. Don't exceed 3 per week. 12 tablet 5    venlafaxine (EFFEXOR-XR) 150 MG Cp24 Take 75 mg by mouth once daily.      venlafaxine (EFFEXOR-XR) 75 MG 24 hr capsule Take 75 mg by mouth once daily.       No current facility-administered medications for this visit.      Facility-Administered Medications Ordered in Other Visits   Medication Dose Route Frequency Provider Last Rate Last Dose    0.9%  NaCl infusion   Intravenous Continuous Natty Bradshaw MD   Stopped at 10/24/18 1327    sodium chloride 0.9% flush 3 mL  3 mL Intravenous PRN Natty Bradshaw MD           ALLERGIES:  Review of patient's allergies indicates:   Allergen Reactions    Aspirin Other (See Comments)     Causes bruising         FAMILY HISTORY:  Family History   Problem  "Relation Age of Onset    Macular degeneration Mother     Blindness Mother     Diabetes Mother     Hypertension Mother     Glaucoma Father     Blindness Father     Cancer Father     Amblyopia Neg Hx     Cataracts Neg Hx     Retinal detachment Neg Hx     Strabismus Neg Hx     Stroke Neg Hx     Thyroid disease Neg Hx        SOCIAL HISTORY:  Social History     Tobacco Use    Smoking status: Never Smoker    Smokeless tobacco: Never Used   Substance Use Topics    Alcohol use: No     Frequency: Never    Drug use: No       Review of Systems:  12 system review of systems is negative except for the symptoms mentioned in HPI.      Objective:     Vitals:    11/12/18 0759   BP: 130/82   Pulse: 97   Weight: 57.7 kg (127 lb 3.3 oz)   Height: 5' 4" (1.626 m)     General: NAD, well nourished   Eyes: no tearing, discharge, no erythema   ENT: moist mucous membranes of the oral cavity, nares patent    Neck: Supple, full range of motion  Cardiovascular: Warm and well perfused, pulses equal and symmetrical  Lungs: Normal work of breathing, normal chest wall excursions  Skin: No rash, lesions, or breakdown on exposed skin  Psychiatry: Mood and affect are appropriate   Abdomen: soft, non tender, non distended  Extremeties: No cyanosis, clubbing or edema.    Neurological   MENTAL STATUS: Alert and oriented to person, place, and time. Attention and concentration within normal limits. Speech without dysarthria, able to name and repeat without difficulty. Recent and remote memory within normal limits   CRANIAL NERVES: No papilledema. Visual fields intact. PERRL. EOMI. Facial sensation intact. Face symmetrical. Hearing grossly intact. Full shoulder shrug bilaterally. Tongue protrudes midline   SENSORY: Sensation is intact to light touch throughout.    MOTOR: Normal bulk and tone. 5/5 deltoid, biceps, triceps, interosseous, hand  bilaterally. 5/5 iliopsoas, knee extension/flexion, foot dorsi/plantarflexion bilaterally.  "   REFLEXES: Symmetric and 2+ throughout.   CEREBELLAR/COORDINATION/GAIT: Gait steady with normal arm swing and stride length.   Finger to nose intact. Normal rapid alternating movements.

## 2018-11-12 NOTE — PATIENT INSTRUCTIONS

## 2018-11-12 NOTE — LETTER
November 12, 2018      Juaquin Alves MD  [address]  [place] [address]           [facility] Neurology  [address] [place] [address]  [place] [address]  Phone: [phone]  Fax: [phone]          Patient: Maddy Lopez   MR Number: [id number]   YOB: 1950   Date of Visit: 11/12/2018       Dear Dr. Juaquin Alves:    Thank you for referring Maddy Lopez to me for evaluation. Attached you will find relevant portions of my assessment and plan of care.    If you have questions, please do not hesitate to call me. I look forward to following Maddy Lopez along with you.    Sincerely,    Sage Lebron MD    Enclosure  CC:  No Recipients    If you would like to receive this communication electronically, please contact [email or web address] or [phone] to request more information on [employer] access.    For providers and/or their staff who would like to refer a patient to [facility], please contact us through our one-stop-shop provider referral line, [facility], at [phone].    If you feel you have received this communication in error or would no longer like to receive these types of communications, please e-mail [email or web address]

## 2018-11-13 ENCOUNTER — TELEPHONE (OUTPATIENT)
Dept: INTERNAL MEDICINE | Facility: CLINIC | Age: 68
End: 2018-11-13

## 2018-11-13 NOTE — TELEPHONE ENCOUNTER
----- Message from Rhonda Shen sent at 11/13/2018 10:23 AM CST -----  Contact: Self [phone]  Patient would like to speak with you about getting a referral for an orthopedic doctor. Please advise

## 2018-11-16 ENCOUNTER — TELEPHONE (OUTPATIENT)
Dept: INTERNAL MEDICINE | Facility: CLINIC | Age: 68
End: 2018-11-16

## 2018-11-16 NOTE — TELEPHONE ENCOUNTER
Spoke with pt to schedule a follow up appointment. Pt will be in on 11/19/2018. Understanding voiced.

## 2018-11-19 ENCOUNTER — OFFICE VISIT (OUTPATIENT)
Dept: INTERNAL MEDICINE | Facility: CLINIC | Age: 68
End: 2018-11-19
Payer: MEDICARE

## 2018-11-19 ENCOUNTER — LAB VISIT (OUTPATIENT)
Dept: LAB | Facility: HOSPITAL | Age: 68
End: 2018-11-19
Attending: INTERNAL MEDICINE
Payer: MEDICARE

## 2018-11-19 VITALS
WEIGHT: 129.44 LBS | HEART RATE: 102 BPM | DIASTOLIC BLOOD PRESSURE: 62 MMHG | HEIGHT: 64 IN | SYSTOLIC BLOOD PRESSURE: 124 MMHG | BODY MASS INDEX: 22.1 KG/M2 | OXYGEN SATURATION: 98 %

## 2018-11-19 DIAGNOSIS — G89.29 CHRONIC RIGHT-SIDED LOW BACK PAIN WITH RIGHT-SIDED SCIATICA: Primary | ICD-10-CM

## 2018-11-19 DIAGNOSIS — M54.41 CHRONIC RIGHT-SIDED LOW BACK PAIN WITH RIGHT-SIDED SCIATICA: Primary | ICD-10-CM

## 2018-11-19 DIAGNOSIS — E78.5 HYPERLIPIDEMIA, UNSPECIFIED HYPERLIPIDEMIA TYPE: ICD-10-CM

## 2018-11-19 DIAGNOSIS — E78.00 PURE HYPERCHOLESTEROLEMIA: ICD-10-CM

## 2018-11-19 DIAGNOSIS — Z00.00 ANNUAL PHYSICAL EXAM: ICD-10-CM

## 2018-11-19 LAB
CHOLEST SERPL-MCNC: 314 MG/DL
CHOLEST/HDLC SERPL: 8.1 {RATIO}
HDLC SERPL-MCNC: 39 MG/DL
HDLC SERPL: 12.4 %
LDLC SERPL CALC-MCNC: 247.8 MG/DL
NONHDLC SERPL-MCNC: 275 MG/DL
TRIGL SERPL-MCNC: 136 MG/DL

## 2018-11-19 PROCEDURE — 99213 OFFICE O/P EST LOW 20 MIN: CPT | Mod: PBBFAC,PO | Performed by: INTERNAL MEDICINE

## 2018-11-19 PROCEDURE — 99999 PR PBB SHADOW E&M-EST. PATIENT-LVL III: CPT | Mod: PBBFAC,,, | Performed by: INTERNAL MEDICINE

## 2018-11-19 PROCEDURE — 36415 COLL VENOUS BLD VENIPUNCTURE: CPT | Mod: PO

## 2018-11-19 PROCEDURE — 80061 LIPID PANEL: CPT

## 2018-11-19 PROCEDURE — 99213 OFFICE O/P EST LOW 20 MIN: CPT | Mod: S$PBB,,, | Performed by: INTERNAL MEDICINE

## 2018-11-19 RX ORDER — ATORVASTATIN CALCIUM 40 MG/1
40 TABLET, FILM COATED ORAL DAILY
Qty: 90 TABLET | Refills: 1 | Status: SHIPPED | OUTPATIENT
Start: 2018-11-19 | End: 2019-01-31 | Stop reason: SDUPTHER

## 2018-11-19 NOTE — PROGRESS NOTES
Portions of this note are generated with voice recognition software. Typographical errors may exist.     SUBJECTIVE:    This is a/an 68 y.o. female here for primary care visit for  Chief Complaint   Patient presents with    Results     bone scan and labs     Patient states that she discontinued her atorvastatin but tried to maintain a low-fat and low-cholesterol diet in the intervening weeks.  She states that she has been taking Decadron to help with headache symptoms but that this was not many days within the past several weeks.  The patient states that she is willing to resume atorvastatin given the remarkable worsening in LDL levels off of medication.    The patient states that she has long had problems with lumbosacral pain and lumbosacral radiculopathy going into the right lower extremity.  The patient states that she was referred to a spinal specialist and had MRI done just prior to relocating to [place].  She was not completed with her diagnostic evaluation but she states that she did had significant degenerative changes.  She states that despite the chronic pain she is able to complete all activities of daily living.  She avoids taking over-the-counter analgesics medicine and simply tolerates the pain. The patient is requesting referral again to a spinal specialist and agrees to have records released to us regarding her outside evaluation      Medications Reviewed and Updated    Past medical, family, and social histories were reviewed and updated.    Review of Systems negative unless otherwise noted in history of present illness-  ROS    General ROS: negative  Psychological ROS: negative  ENT ROS: negative  Endocrine ROS: Negative  Allergy and Immunology ROS: negative  Cardiovascular ROS: negative  Gastrointestinal ROS: negative  Genito-Urinary ROS: negative  Musculoskeletal ROS: negative  Neurological ROS: negative      Allergic:    Review of patient's allergies indicates:   Allergen Reactions     Aspirin Other (See Comments)     Causes bruising         OBJECTIVE:  BP: 124/62 Pulse: 102    Wt Readings from Last 3 Encounters:   11/19/18 58.7 kg (129 lb 6.6 oz)   11/12/18 57.7 kg (127 lb 3.3 oz)   10/24/18 59 kg (130 lb)    Body mass index is 22.21 kg/m².  Previous Blood Pressure Readings :   BP Readings from Last 3 Encounters:   11/19/18 124/62   11/12/18 130/82   10/24/18 (!) 163/89       Physical Exam    GEN: No apparent distress  HEENT: sclera non-icteric, conjunctiva clear  CV: no peripheral edema  PULM: breathing non-labored  ABD: non, protuberant abdomen.  PSYCH: appropriate affect  MSK: able to rise from chair without assistance  - able to flex at the hip with great flexibility without significant pain.  - no significant point tenderness along the midline and paraspinal structures of the lower back  SKIN: normal skin turgor    Pertinent Labs Reviewed       ASSESSMENT/PLAN:    Chronic right-sided low back pain with right-sided sciatica.Condition not optimally controlled. Detailed counseling on self care measures. Plan to monitor clinically in addition to plan below or as listed on After Visit Summary.   -     Ambulatory referral to Neurosurgery    Hyperlipidemia, unspecified hyperlipidemia type.Condition not optimally controlled. Detailed counseling on self care measures. Plan to monitor clinically in addition to plan below or as listed on After Visit Summary.   -     atorvastatin (LIPITOR) 40 MG tablet; Take 1 tablet (40 mg total) by mouth once daily.  Dispense: 90 tablet; Refill: 1          Future Appointments   Date Time Provider Department Center   12/4/2018  1:30 PM Omar Lerner MD [facility] NEUROSU [facility]   2/13/2019  8:40 AM Sage Lebron MD [facility] NEURO [facility]   2/14/2019  7:30 AM LAB, [facility] [place]   2/18/2019  8:20 AM Juaquin Alves MD [facility]   4/2/2019  8:30 AM LAB, [facility] [place]   4/5/2019  8:20 AM Juaquin Alves MD [facility]  Maryse Alves  11/23/2018  5:41 PM

## 2018-11-20 ENCOUNTER — TELEPHONE (OUTPATIENT)
Dept: INTERNAL MEDICINE | Facility: CLINIC | Age: 68
End: 2018-11-20

## 2018-11-20 NOTE — TELEPHONE ENCOUNTER
----- Message from Cristela Choi sent at 11/20/2018  8:11 AM CST -----  Contact: Self/ [phone]  Patient asked to speak with nurse Hartman about her [facility] portal and providing a doctor's name.    Please call.

## 2018-12-05 ENCOUNTER — TELEPHONE (OUTPATIENT)
Dept: INTERNAL MEDICINE | Facility: CLINIC | Age: 68
End: 2018-12-05

## 2018-12-05 NOTE — TELEPHONE ENCOUNTER
----- Message from Angela Ruiz sent at 12/5/2018  1:25 PM CST -----  Contact: Self [phone]  Patient is calling to talk to nurse has personal questions. Please advice

## 2018-12-06 ENCOUNTER — TELEPHONE (OUTPATIENT)
Dept: INTERNAL MEDICINE | Facility: CLINIC | Age: 68
End: 2018-12-06

## 2018-12-06 NOTE — TELEPHONE ENCOUNTER
----- Message from Estee Jean LPN sent at 12/5/2018  1:59 PM CST -----  Contact: Self [phone]  She wants you to call her to get into her portal  ----- Message -----  From: Angela Ruiz  Sent: 12/5/2018   1:25 PM  To: [facility] Staff    Patient is calling to talk to nurse has personal questions. Please advice

## 2018-12-11 ENCOUNTER — TELEPHONE (OUTPATIENT)
Dept: INTERNAL MEDICINE | Facility: CLINIC | Age: 68
End: 2018-12-11

## 2018-12-11 NOTE — TELEPHONE ENCOUNTER
Spoke with pt to inform that records have not yet been received. Pt's [facility] was [facility]. Pt voiced understanding.

## 2018-12-11 NOTE — TELEPHONE ENCOUNTER
----- Message from Kumar Grimes sent at 12/11/2018  8:41 AM CST -----  Contact: self/[phone]  Patient called to speak with the nurse on the status of information received from her previous orthopedic doctor.    She also needs to know when her [facility] account will be reset.    Please call and advise.

## 2018-12-12 ENCOUNTER — PATIENT MESSAGE (OUTPATIENT)
Dept: INTERNAL MEDICINE | Facility: CLINIC | Age: 68
End: 2018-12-12

## 2018-12-19 ENCOUNTER — PATIENT MESSAGE (OUTPATIENT)
Dept: INTERNAL MEDICINE | Facility: CLINIC | Age: 68
End: 2018-12-19

## 2018-12-31 ENCOUNTER — PATIENT MESSAGE (OUTPATIENT)
Dept: INTERNAL MEDICINE | Facility: CLINIC | Age: 68
End: 2018-12-31

## 2019-01-03 ENCOUNTER — TELEPHONE (OUTPATIENT)
Dept: NEUROSURGERY | Facility: CLINIC | Age: 69
End: 2019-01-03

## 2019-01-03 ENCOUNTER — TELEPHONE (OUTPATIENT)
Dept: INTERNAL MEDICINE | Facility: CLINIC | Age: 69
End: 2019-01-03

## 2019-01-03 NOTE — TELEPHONE ENCOUNTER
Spoke with pt to inform that a film wasn't received only papers. Pt was also informed that the papers are on Dr. Alves's desk and he is currently out of the office until 1/4/2019. Pt was informed that once Dr. Alves looks at the records and informs me of the next steps I will call her. Pt voiced understanding.

## 2019-01-03 NOTE — TELEPHONE ENCOUNTER
----- Message from Susan Noemi sent at 1/3/2019  4:24 PM CST -----  Contact: self, [phone] (M)  Patient requests to speak with you regarding call she received from Dr. Lerner' office not receiving film from her previous doctor in [place]. Please advise.

## 2019-01-04 ENCOUNTER — TELEPHONE (OUTPATIENT)
Dept: INTERNAL MEDICINE | Facility: CLINIC | Age: 69
End: 2019-01-04

## 2019-01-04 NOTE — TELEPHONE ENCOUNTER
Spoke with pt to schedule an appointment. Pt will be seen on 1/7/2019 at 1:20pm. Pt voiced understanding.

## 2019-01-04 NOTE — TELEPHONE ENCOUNTER
----- Message from Reece Acuna sent at 1/4/2019  8:39 AM CST -----  Contact: [phone]  Patient would like to be seen sooner than the next available appointment for left arm pain. Please call.

## 2019-01-07 ENCOUNTER — OFFICE VISIT (OUTPATIENT)
Dept: INTERNAL MEDICINE | Facility: CLINIC | Age: 69
End: 2019-01-07
Payer: MEDICARE

## 2019-01-07 ENCOUNTER — TELEPHONE (OUTPATIENT)
Dept: INTERNAL MEDICINE | Facility: CLINIC | Age: 69
End: 2019-01-07

## 2019-01-07 VITALS
OXYGEN SATURATION: 96 % | WEIGHT: 134.69 LBS | HEIGHT: 64 IN | BODY MASS INDEX: 22.99 KG/M2 | HEART RATE: 110 BPM | SYSTOLIC BLOOD PRESSURE: 132 MMHG | DIASTOLIC BLOOD PRESSURE: 85 MMHG

## 2019-01-07 DIAGNOSIS — R03.0 ELEVATED BLOOD PRESSURE, SITUATIONAL: ICD-10-CM

## 2019-01-07 DIAGNOSIS — M51.37 DEGENERATION OF LUMBAR/LUMBOSACRAL DISC WITHOUT MYELOPATHY: ICD-10-CM

## 2019-01-07 DIAGNOSIS — M75.22 BICEPS TENDINITIS OF LEFT UPPER EXTREMITY: ICD-10-CM

## 2019-01-07 DIAGNOSIS — M75.42 IMPINGEMENT SYNDROME OF LEFT SHOULDER: Primary | ICD-10-CM

## 2019-01-07 PROBLEM — M51.379 DEGENERATION OF LUMBAR/LUMBOSACRAL DISC WITHOUT MYELOPATHY: Status: ACTIVE | Noted: 2019-01-07

## 2019-01-07 PROBLEM — Z12.11 SCREENING FOR COLON CANCER: Status: RESOLVED | Noted: 2018-10-24 | Resolved: 2019-01-07

## 2019-01-07 PROCEDURE — 99214 OFFICE O/P EST MOD 30 MIN: CPT | Mod: S$PBB,,, | Performed by: INTERNAL MEDICINE

## 2019-01-07 PROCEDURE — 99999 PR PBB SHADOW E&M-EST. PATIENT-LVL IV: CPT | Mod: PBBFAC,,, | Performed by: INTERNAL MEDICINE

## 2019-01-07 PROCEDURE — 99999 PR PBB SHADOW E&M-EST. PATIENT-LVL IV: ICD-10-PCS | Mod: PBBFAC,,, | Performed by: INTERNAL MEDICINE

## 2019-01-07 PROCEDURE — 99214 OFFICE O/P EST MOD 30 MIN: CPT | Mod: PBBFAC,PO | Performed by: INTERNAL MEDICINE

## 2019-01-07 PROCEDURE — 99214 PR OFFICE/OUTPT VISIT, EST, LEVL IV, 30-39 MIN: ICD-10-PCS | Mod: S$PBB,,, | Performed by: INTERNAL MEDICINE

## 2019-01-07 NOTE — PATIENT INSTRUCTIONS
Recommendations for today    It is important that you check your blood pressure daily to make sure that the top number and the bottom number are within range.  The top number should be less than 140 in the bottom number should be less than 90.  If you have a number that is above this target you need to repeat the blood pressure 1 hr later.  If the blood pressure remains above target we need you to contact the clinic to move up your follow-up appointment    As needed over the next 3-6 weeks as you recover from your shoulder and elbow condition you can take Aleve and Tylenol together throughout the day as needed for symptomatic relief.    Should you fail to receive necessary follow-up from the institution that completed your MRI within the next 2 weeks please contact my office for additional support.  Once you have received the CD ROM of the MRI please contact the Neurosurgery office of Dr. Lerner to schedule your consultation.

## 2019-01-07 NOTE — TELEPHONE ENCOUNTER
Spoke with the nurse at Dr. Nunez's office to inquire about getting a copy of pt's last MRI on disk. I was informed that the pt will have to contact the facility she went to that did the MRI. Dr. Nunez's office doesn't do the MRI's on site. Understanding voiced. Pt will be informed at her visit today.

## 2019-01-07 NOTE — PROGRESS NOTES
Portions of this note are generated with voice recognition software. Typographical errors may exist.     SUBJECTIVE:    This is a/an 68 y.o. female here for primary care visit for  Chief Complaint   Patient presents with    Arm Pain     Patient states that for the past 2 weeks she has been having some pain along the posterior aspect of the left shoulder especially with elevation of the arm above the level of the shoulder and she also is having some pain that radiates down into the biceps muscle and the triceps muscle.  The patient states that there was no traumatic factor that acutely began pain. She does endorse some repetitive activities involving left arm specifically holding her person a flexed posture at the elbow for prolonged episodes.  She also sleeps predominantly on her left shoulder.  Self care measures have been minimal.  Patient has been reluctant even to use over-the-counter medication.  She has a history of acute kidney injury many years ago because of NSAID misuse for chronic headache syndrome.  The patient is understanding that her current kidney function would allow a modest amount of NSAID medication and definitely acetaminophen.    The patient had an epidural injection at L4 and L5 on the right side and L5 and S1 June 2018 at an outside practice.  She had a good effect soon after the injection.    She has a history of right olecranon fracture with casting.    She has a history of a densely calcified fibroid.    Interventionalist that helped with her lumbosacral pain was Cristina Ralph. Location of the office was in [facility].      Medications Reviewed and Updated    Past medical, family, and social histories were reviewed and updated.    Review of Systems negative unless otherwise noted in history of present illness-  ROS    General ROS: negative  Psychological ROS: negative  ENT ROS: negative  Endocrine ROS: Negative  Allergy and Immunology ROS: negative  Cardiovascular ROS:  negative  Pulmonary ROS: Negative  Gastrointestinal ROS: negative  Genito-Urinary ROS: negative  Musculoskeletal ROS: negative  Neurological ROS: negative      Allergic:    Review of patient's allergies indicates:   Allergen Reactions    Aspirin Other (See Comments)     Causes bruising         OBJECTIVE:  BP: 132/85(home reading today, patient in acute pain today) Pulse: 110    Wt Readings from Last 3 Encounters:   01/07/19 61.1 kg (134 lb 11.2 oz)   11/19/18 58.7 kg (129 lb 6.6 oz)   11/12/18 57.7 kg (127 lb 3.3 oz)    Body mass index is 23.12 kg/m².  Previous Blood Pressure Readings :   BP Readings from Last 3 Encounters:   01/07/19 132/85   11/19/18 124/62   11/12/18 130/82       Physical Exam    GEN: No apparent distress  HEENT: sclera non-icteric, conjunctiva clear  CV: no peripheral edema  PULM: breathing non-labored  ABD: Obese, protuberant abdomen.  PSYCH: appropriate affect  MSK: able to rise from chair without assistance  - painful abduction of the left shoulder up to 90°.  Pain is localized along the posterior aspect of the shoulder.  - no point tenderness to the softer bony structures of the left shoulder  - pain with resistance in elbow flexion along the biceps tendon  SKIN: normal skin turgor    Pertinent Labs Reviewed       ASSESSMENT/PLAN:    Impingement syndrome of left shoulder.Condition not optimally controlled. Detailed counseling on self care measures. Plan to monitor clinically in addition to plan below or as listed on After Visit Summary.   -     Ambulatory Referral to Physical/Occupational Therapy    Biceps tendinitis of left upper extremity.Condition not optimally controlled. Detailed counseling on self care measures. Plan to monitor clinically in addition to plan below or as listed on After Visit Summary.   -     Ambulatory Referral to Physical/Occupational Therapy    Elevated blood pressure, situational.Condition stable.  Counseling given today on self-care measures. Plan to monitor  clinically. Continue current medical plan.     Degeneration of lumbar/lumbosacral disc without myelopathy.Condition stable.  Counseling given today on self-care measures. Plan to monitor clinically. Continue current medical plan.         Future Appointments   Date Time Provider Department Center   1/14/2019  1:00 PM SUMANTH Jay [facility]   1/22/2019  9:00 AM Omar Lerner MD [facility]   2/13/2019  8:40 AM Sage Lebron MD [facility] NEURO [facility]   2/14/2019  7:30 AM [facility], [facility] [place]   2/18/2019  8:20 AM Juaquin Alves MD [facility] [place]   4/2/2019  8:30 AM [facility], [facility] [place]   4/5/2019  8:20 AM Juaquin Alves MD [facility] [place]       Juaquin Alves  1/13/2019  1:24 PM

## 2019-01-08 ENCOUNTER — PATIENT MESSAGE (OUTPATIENT)
Dept: INTERNAL MEDICINE | Facility: CLINIC | Age: 69
End: 2019-01-08

## 2019-01-14 ENCOUNTER — CLINICAL SUPPORT (OUTPATIENT)
Dept: REHABILITATION | Facility: HOSPITAL | Age: 69
End: 2019-01-14
Payer: MEDICARE

## 2019-01-14 DIAGNOSIS — R53.1 WEAKNESS: ICD-10-CM

## 2019-01-14 DIAGNOSIS — M79.602 PAIN OF LEFT UPPER EXTREMITY: ICD-10-CM

## 2019-01-14 PROCEDURE — 97110 THERAPEUTIC EXERCISES: CPT | Mod: PN

## 2019-01-14 PROCEDURE — 97165 OT EVAL LOW COMPLEX 30 MIN: CPT | Mod: PN

## 2019-01-14 NOTE — PROGRESS NOTES
See treatment section for initial evaluation.      [facility] Occupational Therapy  Initial Evaluation     Date: 1/14/2019  Patient: Maddy Lopez  Chart Number: [id number]    Therapy Diagnosis:   Encounter Diagnoses   Name Primary?    Pain of left upper extremity     Weakness      Physician: Juaquin Alves*    Physician Orders: OT eval and treat  Medical Diagnosis: L shoulder pain  Evaluation Date: 1/14/2019  Insurance Authorization period Expiration: 1/07/2020  Plan of Care Expiration Period: 3/15/2019    Visit # / Visits Authortized: 1 / 1  Time In:1245  Time Out: 120  Total Billable Time: 35 minutes  LCE1 TE1  Precautions: Standard     Subjective     Involved Side: L shoulder  Dominant Side: Right  Date of Onset: over a month  Mechanism of Injury: insidious onset with progressively worsening symptoms  History of Current Condition: pt presents to clinic today reporting her pain has mostly resolved. She had good ROM and strength with OT only able to replicate mild pain with certain movements.   Surgical Procedure: no  Imaging: none   Previous Therapy: none    Patient's Goals for Therapy: to decrease pain and increase functional use    Pain:  Functional Pain Scale Rating 0-10:   0/10 on average  5/10 at best  10/10 at worst  Location: posterior shoulder  Description: Aching  Aggravating Factors: Lifting  Easing Factors: pain medication    Occupation:  retired      Functional Limitations/Social History:    Previous functional status includes: Independent with all ADLs.     Current FunctionalStatus   Home/Living environment : lives alone      Limitation of Functional Status as follows:   ADLs/IADLs:     - Feeding: Independent      - Bathing: Independent      - Dressing/Grooming: Independent      - Driving: Independent       Leisure: housework, difficulty lifting      Past Medical History/Physical Systems Review:   Maddy Lopez  has a past medical history of Arthritis and  Hypertension.    Maddy Lopez  has a past surgical history that includes Levator Dehiscence (Bilateral, 2013) and Colonoscopy (N/A, 10/24/2018).    Maddy has a current medication list which includes the following prescription(s): atorvastatin, cholecalciferol (vitamin d3), dexamethasone, frovatriptan, venlafaxine, and venlafaxine, and the following Facility-Administered Medications: sodium chloride 0.9% and sodium chloride 0.9%.    Review of patient's allergies indicates:   Allergen Reactions    Aspirin Other (See Comments)     Causes bruising        Objective     Sensation Test: Patient denies any numbness/tingling    Observation/Inspection:rounded shoulders    Range of Motion/Strength:   Shoulder  Left   Right  Pain/Dysfunction with Movement    AROM PROM MMT AROM PROM MMT    Flexion 165 WNL 4/5 WNL WNL WNL    Extension 55 WNL 5/5 WNL WNL WNL    Abduction 145 WNL 4/5 WNL WNL WNL    HorizAdduction 55 WNL 5/5 WNL WNL WNL    Internal rotation T12 WNL 5/5 WNL WNL WNL    ER at 90° abd 90 WNL NT WNL WNL WNL    ER at 0° abd 85 WNL 4/5 WNL WNL WNL      ROM Comments: Pain at end range    Painful Arc: none noted     Tenderness upon Palpation:      Positive: Bicipital Groove    Special Tests:  AC Joint Left Right   Empty Can Test - -   Hawkin's Kenndy + -   Neer's Test + -     Scapular Control/Dyskinesis:    Normal / Subtle / Obvious  Comments    Left  Normal -    Right  Normal -       CMS Impairment/Limitation/Restriction for FOTO Shoulder Survey    Therapist reviewed FOTO scores for Maddy Lopez on 1/14/2019.   FOTO documents entered into EPIC - see Media section.    Limitation Score: 44%  Category: Self Care    Current : CK = at least 40% but < 60% impaired, limited or restricted  Goal: CI = at least 1% but < 20% impaired, limited or restricted         Treatment     Treatment Time In: 110  Treatment Time Out: 120  Total Treatment time separate from Evaluation time:10    Maddy received therapeutic exercises  for 10 minutes including:  -see attached in pt instructions    Home Exercise Program/Education:  Issued HEP (see patient instructions in EMR) and educated on modality use for pain management . Exercises were reviewed and Maddy was able to demonstrate them prior to the end of the session.   Pt received a written copy of exercises to perform at home. Maddy demonstrated good  understanding of the education provided.  Pt was advised to perform these exercises free of pain, and to stop performing them if pain occurs.    Patient/Family Education: role of OT, goals for OT, scheduling/cancellations - pt verbalized understanding. Discussed insurance limitations with patient.    Assessment     Maddy Lopez is a 68 y.o. female referred to outpatient occupational therapy and presents with a medical diagnosis of L shoulder pain, resulting in Decreased ROM, Decreased muscle strength and Increased pain and demonstrates limitations as described in the chart below. Following medical record review it is determined that pt will benefit from occupational therapy services in order to maximize pain free and/or functional use of left shoulder. The following goals were discussed with the patient and patient is in agreement with them as to be addressed in the treatment plan. The patient's rehab potential is Good.     Anticipated barriers to occupational therapy: none  Pt has no cultural, educational or language barriers to learning provided.    Profile and History Assessment of Occupational Performance Level of Clinical Decision Making Complexity Score   Occupational Profile:   Maddy Lopez is a 68 y.o. female who lives alone and is currently retired. Maddy Lopez has difficulty with  housework/household chores  affecting his/her daily functional abilities. His/her main goal for therapy is to decrease pain and increase functional use.     Comorbidities:   HTN    Medical and Therapy History Review:    Expanded               Performance Deficits    Physical:  Muscle Power/Strength  Muscle Endurance  Postural Control  Pain    Cognitive:  No Deficits    Psychosocial:    No Deficits     Clinical Decision Making:  low    Assessment Process:  Problem-Focused Assessments    Modification/Need for Assistance:  Not Necessary    Intervention Selection:  Several Treatment Options       low  Based on PMHX, co morbidities , data from assessments and functional level of assistance required with task and clinical presentation directly impacting function.       The following goals were discussed with the patient and patient is in agreement with them as to be addressed in the treatment plan.     Goals:   Long Term Goals to be met by discharge:  1) Independent with HEP  2) Pt will demonstrate (L) shoulder AROM WNL grossly for Lane with ADL's  3) Pt will demonstrate (L) shoulder MMT WNL grossly for Lane with functional activities  4) Independent and pain free with ADL's and IADL's  5) Patient will be able to achieve less than or equal to 10% on FOTO shoulder survey demonstrating overall improved functional ability with upper extremity.     Plan   Certification Period/Plan of care expiration: 1/14/2019 to 3/15/2019.    Pt to trial HEP at this time should she have difficulty plan to see her in Outpatient Occupational Therapy 1 times weekly for 4 weeks to include the following interventions: Manual therapy/joint mobilizations, Modalities for pain management, Therapeutic exercises/activities., Strengthening, Joint Protection and Energy Conservation.      SUMANTH Jay

## 2019-01-15 ENCOUNTER — TELEPHONE (OUTPATIENT)
Dept: INTERNAL MEDICINE | Facility: CLINIC | Age: 69
End: 2019-01-15

## 2019-01-15 ENCOUNTER — PATIENT MESSAGE (OUTPATIENT)
Dept: INTERNAL MEDICINE | Facility: CLINIC | Age: 69
End: 2019-01-15

## 2019-01-15 NOTE — TELEPHONE ENCOUNTER
Spoke with Nitin at the [facility] who states that the fax number I was given wasn't correct when I sent in the release of information. I was given the correct fax number and have faxed the release again. Nitin informed that the disk should be mailed out one day after they receive the release. Understanding voiced.     A [facility] message has been sent to the pt to update on the status of disk.

## 2019-01-19 ENCOUNTER — PATIENT MESSAGE (OUTPATIENT)
Dept: INTERNAL MEDICINE | Facility: CLINIC | Age: 69
End: 2019-01-19

## 2019-01-21 ENCOUNTER — OFFICE VISIT (OUTPATIENT)
Dept: DERMATOLOGY | Facility: CLINIC | Age: 69
End: 2019-01-21
Payer: MEDICARE

## 2019-01-21 ENCOUNTER — PATIENT MESSAGE (OUTPATIENT)
Dept: INTERNAL MEDICINE | Facility: CLINIC | Age: 69
End: 2019-01-21

## 2019-01-21 DIAGNOSIS — L65.9 HAIR LOSS DISORDER: Primary | ICD-10-CM

## 2019-01-21 DIAGNOSIS — L21.9 SEBORRHEIC DERMATITIS OF SCALP: ICD-10-CM

## 2019-01-21 PROCEDURE — 99202 OFFICE O/P NEW SF 15 MIN: CPT | Mod: S$PBB,,, | Performed by: PHYSICIAN ASSISTANT

## 2019-01-21 PROCEDURE — 99999 PR PBB SHADOW E&M-EST. PATIENT-LVL II: CPT | Mod: PBBFAC,,, | Performed by: PHYSICIAN ASSISTANT

## 2019-01-21 PROCEDURE — 99999 PR PBB SHADOW E&M-EST. PATIENT-LVL II: ICD-10-PCS | Mod: PBBFAC,,, | Performed by: PHYSICIAN ASSISTANT

## 2019-01-21 PROCEDURE — 99202 PR OFFICE/OUTPT VISIT, NEW, LEVL II, 15-29 MIN: ICD-10-PCS | Mod: S$PBB,,, | Performed by: PHYSICIAN ASSISTANT

## 2019-01-21 PROCEDURE — 99212 OFFICE O/P EST SF 10 MIN: CPT | Mod: PBBFAC | Performed by: PHYSICIAN ASSISTANT

## 2019-01-21 RX ORDER — VENLAFAXINE HYDROCHLORIDE 150 MG/1
150 CAPSULE, EXTENDED RELEASE ORAL DAILY
Qty: 90 CAPSULE | Refills: 1 | Status: SHIPPED | OUTPATIENT
Start: 2019-01-21 | End: 2019-08-01 | Stop reason: SDUPTHER

## 2019-01-21 RX ORDER — KETOCONAZOLE 20 MG/ML
SHAMPOO, SUSPENSION TOPICAL
Qty: 120 ML | Refills: 5 | Status: SHIPPED | OUTPATIENT
Start: 2019-01-21 | End: 2019-09-04 | Stop reason: SDUPTHER

## 2019-01-21 RX ORDER — FLUOCINONIDE TOPICAL SOLUTION USP, 0.05% 0.5 MG/ML
SOLUTION TOPICAL
Qty: 60 ML | Refills: 2 | Status: SHIPPED | OUTPATIENT
Start: 2019-01-21 | End: 2019-09-04 | Stop reason: SDUPTHER

## 2019-01-21 RX ORDER — VENLAFAXINE HYDROCHLORIDE 75 MG/1
75 CAPSULE, EXTENDED RELEASE ORAL DAILY
Qty: 90 CAPSULE | Refills: 1 | Status: SHIPPED | OUTPATIENT
Start: 2019-01-21 | End: 2019-08-01 | Stop reason: SDUPTHER

## 2019-01-21 NOTE — PROGRESS NOTES
"  Subjective:       Patient ID:  Maddy Lopez is a 68 y.o. female who presents for   Chief Complaint   Patient presents with    Hair Loss     scalp     Hair Loss  - Initial  Affected locations: scalp  Duration: 2 weeks (maybe longer, just noticed after blowout)  Signs / symptoms: asymptomatic  Aggravated by: nothing  Relieving factors/Treatments tried: nothing    Reports nml thyroid tests last year (not with [facility]).  Washes hair once weekly.   Gets "blowouts" at the salon q wk x 1 month. Dyes hair once yearly.  Hx of relaxers once monthly "years ago." Hx of melinda for about 40 yrs.    Review of Systems   Constitutional: Negative for fever, weight loss, weight gain, fatigue and malaise.   HENT: Positive for headaches (hx of migraines).    Skin: Negative for itching, rash, daily sunscreen use and activity-related sunscreen use.   Neurological: Positive for headaches (hx of migraines).   Psychiatric/Behavioral: Negative for high stress.   Hematologic/Lymphatic: Does not bruise/bleed easily.        Objective:    Physical Exam   Constitutional: She appears well-developed and well-nourished. No distress.   Neurological: She is alert and oriented to person, place, and time. She is not disoriented.   Psychiatric: She has a normal mood and affect.   Skin:   Areas Examined (abnormalities noted in diagram):   Scalp / Hair Palpated and Inspected  Head / Face Inspection Performed  Neck Inspection Performed  RUE Inspected  LUE Inspection Performed  Nails and Digits Inspection Performed                  Diagram Legend     Erythematous scaling macule/papule c/w actinic keratosis       Vascular papule c/w angioma      Pigmented verrucoid papule/plaque c/w seborrheic keratosis      Yellow umbilicated papule c/w sebaceous hyperplasia      Irregularly shaped tan macule c/w lentigo     1-2 mm smooth white papules consistent with Milia      Movable subcutaneous cyst with punctum c/w epidermal inclusion cyst      Subcutaneous " movable cyst c/w pilar cyst      Firm pink to brown papule c/w dermatofibroma      Pedunculated fleshy papule(s) c/w skin tag(s)      Evenly pigmented macule c/w junctional nevus     Mildly variegated pigmented, slightly irregular-bordered macule c/w mildly atypical nevus      Flesh colored to evenly pigmented papule c/w intradermal nevus       Pink pearly papule/plaque c/w basal cell carcinoma      Erythematous hyperkeratotic cursted plaque c/w SCC      Surgical scar with no sign of skin cancer recurrence      Open and closed comedones      Inflammatory papules and pustules      Verrucoid papule consistent consistent with wart     Erythematous eczematous patches and plaques     Dystrophic onycholytic nail with subungual debris c/w onychomycosis     Umbilicated papule    Erythematous-base heme-crusted tan verrucoid plaque consistent with inflamed seborrheic keratosis     Erythematous Silvery Scaling Plaque c/w Psoriasis     See annotation      Assessment / Plan:        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  Recommend Rogaine 5% daily.  Consider ILK at f/u appt.    Seborrheic dermatitis of scalp  -     ketoconazole (NIZORAL) 2 % shampoo; Wash hair with medicated shampoo at least 2x/week - let sit on scalp at least 5 minutes prior to rinsing  Dispense: 120 mL; Refill: 5  -     fluocinonide (LIDEX) 0.05 % external solution; AAA scalp qhs  Dispense: 60 mL; Refill: 2             Follow-up in about 3 months (around 4/21/2019).

## 2019-01-21 NOTE — TELEPHONE ENCOUNTER
Spoke with 3 different people at the [facility] ( Paris Dixon and Angelica). After speaking with Angelica I was told she didn't see where they sent off for the pt's MRI on disk. Angelica asked if I can re fax the request to her and she will make sure the disk is mailed out today. Understanding voiced. A [facility] message has been sent to the pt to update the status of disk.

## 2019-01-21 NOTE — TELEPHONE ENCOUNTER
Spoke with pt to inform that MRI disk has arrived. Pt will get a call back once the disk is uploaded to [facility]. Pt voiced understanding.

## 2019-01-24 ENCOUNTER — TELEPHONE (OUTPATIENT)
Dept: INTERNAL MEDICINE | Facility: CLINIC | Age: 69
End: 2019-01-24

## 2019-01-24 NOTE — TELEPHONE ENCOUNTER
Spoke with pt to inform that an extra copy of her latest MRI could be picked up. Pt states that she will be by on 1/25/19 to pick it up. Understanding voiced.

## 2019-01-31 ENCOUNTER — PATIENT MESSAGE (OUTPATIENT)
Dept: INTERNAL MEDICINE | Facility: CLINIC | Age: 69
End: 2019-01-31

## 2019-01-31 DIAGNOSIS — E78.5 HYPERLIPIDEMIA, UNSPECIFIED HYPERLIPIDEMIA TYPE: ICD-10-CM

## 2019-02-01 RX ORDER — ATORVASTATIN CALCIUM 40 MG/1
40 TABLET, FILM COATED ORAL DAILY
Qty: 90 TABLET | Refills: 1 | Status: SHIPPED | OUTPATIENT
Start: 2019-02-01 | End: 2020-03-02 | Stop reason: SDUPTHER

## 2019-02-09 ENCOUNTER — PATIENT MESSAGE (OUTPATIENT)
Dept: INTERNAL MEDICINE | Facility: CLINIC | Age: 69
End: 2019-02-09

## 2019-02-13 ENCOUNTER — OFFICE VISIT (OUTPATIENT)
Dept: NEUROLOGY | Facility: CLINIC | Age: 69
End: 2019-02-13
Payer: MEDICARE

## 2019-02-13 VITALS
SYSTOLIC BLOOD PRESSURE: 125 MMHG | HEIGHT: 64 IN | WEIGHT: 134.69 LBS | BODY MASS INDEX: 22.99 KG/M2 | HEART RATE: 103 BPM | DIASTOLIC BLOOD PRESSURE: 77 MMHG

## 2019-02-13 DIAGNOSIS — R51.9 CHRONIC NONINTRACTABLE HEADACHE, UNSPECIFIED HEADACHE TYPE: ICD-10-CM

## 2019-02-13 DIAGNOSIS — G89.29 CHRONIC NONINTRACTABLE HEADACHE, UNSPECIFIED HEADACHE TYPE: ICD-10-CM

## 2019-02-13 PROCEDURE — 99999 PR PBB SHADOW E&M-EST. PATIENT-LVL III: ICD-10-PCS | Mod: PBBFAC,,, | Performed by: PSYCHIATRY & NEUROLOGY

## 2019-02-13 PROCEDURE — 99999 PR PBB SHADOW E&M-EST. PATIENT-LVL III: CPT | Mod: PBBFAC,,, | Performed by: PSYCHIATRY & NEUROLOGY

## 2019-02-13 PROCEDURE — 99213 OFFICE O/P EST LOW 20 MIN: CPT | Mod: PBBFAC,PO | Performed by: PSYCHIATRY & NEUROLOGY

## 2019-02-13 PROCEDURE — 99214 PR OFFICE/OUTPT VISIT, EST, LEVL IV, 30-39 MIN: ICD-10-PCS | Mod: S$PBB,,, | Performed by: PSYCHIATRY & NEUROLOGY

## 2019-02-13 PROCEDURE — 99214 OFFICE O/P EST MOD 30 MIN: CPT | Mod: S$PBB,,, | Performed by: PSYCHIATRY & NEUROLOGY

## 2019-02-13 RX ORDER — PROPRANOLOL HYDROCHLORIDE 10 MG/1
10 TABLET ORAL 2 TIMES DAILY
Qty: 180 TABLET | Refills: 3 | Status: SHIPPED | OUTPATIENT
Start: 2019-02-13 | End: 2019-04-10 | Stop reason: DRUGHIGH

## 2019-02-13 NOTE — PROGRESS NOTES
.  [facility] NEUROLOGY  [facility] [place]    Date: 2/13/19  Patient Name: Maddy Lopez   MRN: [id number]   PCP: Juaquin Alves  Referring Provider: No ref. provider found    Assessment:   Maddy Lopez is a 68 y.o. female presenting in follow-up for management of chronic headache.  Patient reports a longstanding history of migraine however majority of headache features most consistent with tension headache.  After discussion of prophylactic therapeutic options patient has elected to begin a trial of propranolol.  May also be a candidate for zonisamide or am of ache.  Headache frequency not currently at threshold for Botox approval.    Plan:     Problem List Items Addressed This Visit        Neuro    Chronic headache disorder    Current Assessment & Plan     -- continue current effexor  -- trial of propranalol  -- frova PRN               Sage Lebron MD  [facility]   Department of Neurology    Patient note was created using MModal Dictation.  Any errors in syntax or even information may not have been identified and edited on initial review prior to signing this note.  Subjective:        HPI:   Ms. Maddy Lopez is a 68 y.o. female   Presenting in follow-up for frequent headaches.  The patient reports that since her last visit she has been tracking her headaches more regularly now believes that she experiences at least 2 headaches per week.  She states that her headaches do rapidly responded Frova when she takes it in time but states she is no longer using dexamethasone which her headaches have stopped responding to.  She does believe her headache frequency overall has increased despite continued compliance with Effexor.  She is interested in alternative prophylactic options noting that she is somewhat hesitant to consider injectable therapies. She is hesitant to consider zonisamide noting side effects of paresthesias when she tried topamax in the  past.    Prior meds: Magnesium and riboflavin, amitriptyline, topamax, depakote, effexor, frova    PAST MEDICAL HISTORY:  Past Medical History:   Diagnosis Date    Arthritis     Fever blister     Hypertension        PAST SURGICAL HISTORY:  Past Surgical History:   Procedure Laterality Date    COLONOSCOPY/Golytely N/A 10/24/2018    [facility]formed [facility]shaw [facility] ENDO    LEVATOR DEHISCENCE Bilateral 2013       CURRENT MEDS:  Current Outpatient Medications   Medication Sig Dispense Refill    atorvastatin (LIPITOR) 40 MG tablet Take 1 tablet (40 mg total) by mouth once daily. 90 tablet 1    cholecalciferol, vitamin D3, (VITAMIN D3) 2,000 unit Cap Take 1 capsule by mouth once daily.      fluocinonide (LIDEX) 0.05 % external solution AAA scalp qhs 60 mL 2    frovatriptan (FROVA) 2.5 MG tablet Take 1 tablet (2.5 mg total) by mouth as needed for Migraine. If recurs, may repeat once after 2 hours. Don't exceed 3 per week. 12 tablet 5    ketoconazole (NIZORAL) 2 % shampoo Wash hair with medicated shampoo at least 2x/week - let sit on scalp at least 5 minutes prior to rinsing 120 mL 5    venlafaxine (EFFEXOR-XR) 150 MG Cp24 Take 1 capsule (150 mg total) by mouth once daily. Along with 75 mg tablet.  Total daily dose to 225 mg 90 capsule 1    venlafaxine (EFFEXOR-XR) 75 MG 24 hr capsule Take 1 capsule (75 mg total) by mouth once daily. Along with 150 mg tablet.  Total daily dose to 225 mg 90 capsule 1    propranolol (INDERAL) 10 MG tablet Take 1 tablet (10 mg total) by mouth 2 (two) times daily. 180 tablet 3     No current facility-administered medications for this visit.      Facility-Administered Medications Ordered in Other Visits   Medication Dose Route Frequency Provider Last Rate Last Dose    0.9%  NaCl infusion   Intravenous Continuous Natty Bradshaw MD   Stopped at 10/24/18 1327    sodium chloride 0.9% flush 3 mL  3 mL Intravenous PRN Natty Bradshaw MD      "      ALLERGIES:  Review of patient's allergies indicates:   Allergen Reactions    Aspirin Other (See Comments)     Causes bruising         FAMILY HISTORY:  Family History   Problem Relation Age of Onset    Macular degeneration Mother     Blindness Mother     Diabetes Mother     Hypertension Mother     Glaucoma Father     Blindness Father     Cancer Father     Amblyopia Neg Hx     Cataracts Neg Hx     Retinal detachment Neg Hx     Strabismus Neg Hx     Stroke Neg Hx     Thyroid disease Neg Hx     Melanoma Neg Hx        SOCIAL HISTORY:  Social History     Tobacco Use    Smoking status: Never Smoker    Smokeless tobacco: Never Used   Substance Use Topics    Alcohol use: No     Frequency: Never    Drug use: No       Review of Systems:  12 system review of systems is negative except for the symptoms mentioned in HPI.      Objective:     Vitals:    02/13/19 0836   BP: 125/77   Pulse: 103   Weight: 61.1 kg (134 lb 11.2 oz)   Height: 5' 4" (1.626 m)     General: NAD, well nourished   Eyes: no tearing, discharge, no erythema   ENT: moist mucous membranes of the oral cavity, nares patent    Neck: Supple, full range of motion  Cardiovascular: Warm and well perfused, pulses equal and symmetrical  Lungs: Normal work of breathing, normal chest wall excursions  Skin: No rash, lesions, or breakdown on exposed skin  Psychiatry: Mood and affect are appropriate   Abdomen: soft, non tender, non distended  Extremeties: No cyanosis, clubbing or edema.    Neurological   MENTAL STATUS: Alert and oriented to person, place, and time. Attention and concentration within normal limits. Speech without dysarthria, able to name and repeat without difficulty.   CRANIAL NERVES: No papilledema. Visual fields intact. PERRL. EOMI. Facial sensation intact. Face symmetrical. Hearing grossly intact. Full shoulder shrug bilaterally. Tongue protrudes midline   SENSORY: Sensation is intact to light touch throughout.    MOTOR: Normal bulk " and tone. 5/5 deltoid, biceps, triceps, interosseous, hand  bilaterally. 5/5 iliopsoas, knee extension/flexion, foot dorsi/plantarflexion bilaterally.    REFLEXES: Symmetric and 2+ throughout.   CEREBELLAR/COORDINATION/GAIT: Gait steady with normal arm swing and stride length.    Normal rapid alternating movements.

## 2019-02-14 ENCOUNTER — TELEPHONE (OUTPATIENT)
Dept: NEUROLOGY | Facility: CLINIC | Age: 69
End: 2019-02-14

## 2019-02-14 ENCOUNTER — LAB VISIT (OUTPATIENT)
Dept: LAB | Facility: HOSPITAL | Age: 69
End: 2019-02-14
Attending: INTERNAL MEDICINE
Payer: MEDICARE

## 2019-02-14 DIAGNOSIS — E78.00 PURE HYPERCHOLESTEROLEMIA: ICD-10-CM

## 2019-02-14 DIAGNOSIS — Z00.00 ANNUAL PHYSICAL EXAM: ICD-10-CM

## 2019-02-14 LAB
ALBUMIN SERPL BCP-MCNC: 3.8 G/DL
ALP SERPL-CCNC: 155 U/L
ALT SERPL W/O P-5'-P-CCNC: 42 U/L
ANION GAP SERPL CALC-SCNC: 12 MMOL/L
AST SERPL-CCNC: 23 U/L
BILIRUB SERPL-MCNC: 0.5 MG/DL
BUN SERPL-MCNC: 12 MG/DL
CALCIUM SERPL-MCNC: 9.8 MG/DL
CHLORIDE SERPL-SCNC: 104 MMOL/L
CHOLEST SERPL-MCNC: 209 MG/DL
CHOLEST/HDLC SERPL: 3.7 {RATIO}
CO2 SERPL-SCNC: 24 MMOL/L
CREAT SERPL-MCNC: 1 MG/DL
EST. GFR  (AFRICAN AMERICAN): >60 ML/MIN/1.73 M^2
EST. GFR  (NON AFRICAN AMERICAN): 58.8 ML/MIN/1.73 M^2
GLUCOSE SERPL-MCNC: 185 MG/DL
HDLC SERPL-MCNC: 57 MG/DL
HDLC SERPL: 27.3 %
LDLC SERPL CALC-MCNC: 139.2 MG/DL
NONHDLC SERPL-MCNC: 152 MG/DL
POTASSIUM SERPL-SCNC: 3.9 MMOL/L
PROT SERPL-MCNC: 7.1 G/DL
SODIUM SERPL-SCNC: 140 MMOL/L
TRIGL SERPL-MCNC: 64 MG/DL

## 2019-02-14 PROCEDURE — 80061 LIPID PANEL: CPT

## 2019-02-14 PROCEDURE — 36415 COLL VENOUS BLD VENIPUNCTURE: CPT | Mod: PO

## 2019-02-14 PROCEDURE — 80053 COMPREHEN METABOLIC PANEL: CPT

## 2019-02-14 NOTE — TELEPHONE ENCOUNTER
I called the patient back.She states the rx was 60 pills for $33 and the patient wants to know whats going on with the price of the rx. You told the patient to stop taking this rx Dexamethasone. She now wants to know what can be taken in the place of it.

## 2019-02-14 NOTE — TELEPHONE ENCOUNTER
----- Message from Susan Franklin sent at 2/14/2019  1:45 PM CST -----  Contact: self, [phone] (M)  Patient requests to speak with you regarding propranolol medication prescribed yesterday. Please advise.

## 2019-02-18 ENCOUNTER — OFFICE VISIT (OUTPATIENT)
Dept: NEUROSURGERY | Facility: CLINIC | Age: 69
End: 2019-02-18
Payer: MEDICARE

## 2019-02-18 ENCOUNTER — HOSPITAL ENCOUNTER (OUTPATIENT)
Dept: RADIOLOGY | Facility: HOSPITAL | Age: 69
Discharge: HOME | End: 2019-02-18
Attending: STUDENT IN AN ORGANIZED HEALTH CARE EDUCATION/TRAINING PROGRAM
Payer: MEDICARE

## 2019-02-18 VITALS — HEART RATE: 89 BPM | DIASTOLIC BLOOD PRESSURE: 68 MMHG | SYSTOLIC BLOOD PRESSURE: 106 MMHG

## 2019-02-18 DIAGNOSIS — M54.40 LUMBAGO OF LUMBAR REGION WITH SCIATICA: ICD-10-CM

## 2019-02-18 DIAGNOSIS — M54.40 LUMBAGO OF LUMBAR REGION WITH SCIATICA: Primary | ICD-10-CM

## 2019-02-18 PROCEDURE — 99203 PR OFFICE/OUTPT VISIT, NEW, LEVL III, 30-44 MIN: ICD-10-PCS | Mod: S$PBB,,, | Performed by: NEUROLOGICAL SURGERY

## 2019-02-18 PROCEDURE — 72110 X-RAY EXAM L-2 SPINE 4/>VWS: CPT | Mod: 26,,, | Performed by: RADIOLOGY

## 2019-02-18 PROCEDURE — 99203 OFFICE O/P NEW LOW 30 MIN: CPT | Mod: S$PBB,,, | Performed by: NEUROLOGICAL SURGERY

## 2019-02-18 PROCEDURE — 99212 OFFICE O/P EST SF 10 MIN: CPT | Mod: PBBFAC,PN,25 | Performed by: NEUROLOGICAL SURGERY

## 2019-02-18 PROCEDURE — 72110 X-RAY EXAM L-2 SPINE 4/>VWS: CPT | Mod: TC,PN

## 2019-02-18 PROCEDURE — 72110 XR LUMBAR SPINE 5 VIEW WITH FLEX AND EXT: ICD-10-PCS | Mod: 26,,, | Performed by: RADIOLOGY

## 2019-02-18 PROCEDURE — 99999 PR PBB SHADOW E&M-EST. PATIENT-LVL II: CPT | Mod: PBBFAC,,, | Performed by: NEUROLOGICAL SURGERY

## 2019-02-18 PROCEDURE — 99999 PR PBB SHADOW E&M-EST. PATIENT-LVL II: ICD-10-PCS | Mod: PBBFAC,,, | Performed by: NEUROLOGICAL SURGERY

## 2019-02-18 NOTE — PROGRESS NOTES
NEUROSURGICAL OUTPATIENT CONSULTATION NOTE    DATE OF SERVICE:  02/18/2019    ATTENDING PHYSICIAN:  Omar Lerner MD    CONSULT REQUESTED BY:      REASON FOR CONSULT:  Back pain    SUBJECTIVE:    HISTORY OF PRESENT ILLNESS:  This is a very pleasant 68 y.o. female who presents for eval of chronic back pain that has worsened over the past few months.  A few months ago she developed worsened bilateral lumbosacral back pain.  She underwent ZULAY with improvement of her left sided back pain.  It didn't help her right sided back pain.  Pain is worse when she initially moves and then will dissipate after she ambulates for awhile.  She intermittently has RLE radicular pain down outside of leg to ankle.  Back pain is worse than leg pain.  She isn't smoker and doesn't take narcotics. Today she does not have any pain.     Low Back Pain Scale  R Low Back-Pain Score: 8  R Low Back-Pain Intensity: I can tolerate the pain I have without having to use pain killers  R Low Back-Pain Score: I can look after myself normally without causing extra pain  Low Back-Lifting: I can lift heavy weights without extra pain   Low Back-Walking: Pain does not prevent me from walking any distance   Low Back-Sitting: I can sit in any chair as long as I like   Low Back-Standing: I cannot stand for longer than 1 hour without increasing pain   Low Back-Sleeping: I have no pain in bed   Low Back-Social Life: My social life is normal and give me no pain   Low Back-Traveling: I have some pain when traveling but piedad of my usual forms of travel make it any worse   Low Back-Changing Degree of Pain: My pain is gradually worsening         PAST MEDICAL HISTORY:  Active Ambulatory Problems     Diagnosis Date Noted    Chronic headache disorder 11/12/2018    Hyperlipidemia 11/12/2018    Degeneration of lumbar/lumbosacral disc without myelopathy 01/07/2019    Pain of left upper extremity 01/14/2019    Weakness 01/14/2019     Resolved Ambulatory Problems      Diagnosis Date Noted    Screening for colon cancer 10/24/2018     Past Medical History:   Diagnosis Date    Arthritis     Fever blister     Hypertension        PAST SURGICAL HISTORY:  Past Surgical History:   Procedure Laterality Date    COLONOSCOPY/Golytely N/A 10/24/2018    Performed by [facility] at [facility]    LEVATOR DEHISCENCE Bilateral 2013       SOCIAL HISTORY:   Social History     Socioeconomic History    Marital status: Single     Spouse name: Not on file    Number of children: Not on file    Years of education: Not on file    Highest education level: Not on file   Social Needs    Financial resource strain: Not on file    Food insecurity - worry: Not on file    Food insecurity - inability: Not on file    Transportation needs - medical: Not on file    Transportation needs - non-medical: Not on file   Occupational History    Not on file   Tobacco Use    Smoking status: Never Smoker    Smokeless tobacco: Never Used   Substance and Sexual Activity    Alcohol use: No     Frequency: Never    Drug use: No    Sexual activity: No   Other Topics Concern    Are you pregnant or think you may be? Not Asked    Breast-feeding Not Asked   Social History Narrative    Not on file       FAMILY HISTORY:  Family History   Problem Relation Age of Onset    Macular degeneration Mother     Blindness Mother     Diabetes Mother     Hypertension Mother     Glaucoma Father     Blindness Father     Cancer Father     Amblyopia Neg Hx     Cataracts Neg Hx     Retinal detachment Neg Hx     Strabismus Neg Hx     Stroke Neg Hx     Thyroid disease Neg Hx     Melanoma Neg Hx        CURRENTS MEDICATIONS:  Current Outpatient Medications on File Prior to Visit   Medication Sig Dispense Refill    atorvastatin (LIPITOR) 40 MG tablet Take 1 tablet (40 mg total) by mouth once daily. 90 tablet 1    cholecalciferol, vitamin D3, (VITAMIN D3) 2,000 unit Cap Take 1 capsule by mouth once daily.       fluocinonide (LIDEX) 0.05 % external solution AAA scalp qhs 60 mL 2    frovatriptan (FROVA) 2.5 MG tablet Take 1 tablet (2.5 mg total) by mouth as needed for Migraine. If recurs, may repeat once after 2 hours. Don't exceed 3 per week. 12 tablet 5    ketoconazole (NIZORAL) 2 % shampoo Wash hair with medicated shampoo at least 2x/week - let sit on scalp at least 5 minutes prior to rinsing 120 mL 5    propranolol (INDERAL) 10 MG tablet Take 1 tablet (10 mg total) by mouth 2 (two) times daily. 180 tablet 3    venlafaxine (EFFEXOR-XR) 150 MG Cp24 Take 1 capsule (150 mg total) by mouth once daily. Along with 75 mg tablet.  Total daily dose to 225 mg 90 capsule 1    venlafaxine (EFFEXOR-XR) 75 MG 24 hr capsule Take 1 capsule (75 mg total) by mouth once daily. Along with 150 mg tablet.  Total daily dose to 225 mg 90 capsule 1     Current Facility-Administered Medications on File Prior to Visit   Medication Dose Route Frequency Provider Last Rate Last Dose    0.9%  NaCl infusion   Intravenous Continuous Natty Bradshaw MD   Stopped at 10/24/18 1327    sodium chloride 0.9% flush 3 mL  3 mL Intravenous PRN Natty Bradshaw MD           ALLERGIES:  Review of patient's allergies indicates:   Allergen Reactions    Aspirin Other (See Comments)     Causes bruising         REVIEW OF SYSTEMS:  Review of Systems   Constitutional: Negative for diaphoresis, fever and weight loss.   Respiratory: Negative for shortness of breath.    Cardiovascular: Negative for chest pain.   Gastrointestinal: Negative for blood in stool.   Genitourinary: Negative for hematuria.   Endo/Heme/Allergies: Does not bruise/bleed easily.   All other systems reviewed and are negative.      OBJECTIVE:    PHYSICAL EXAMINATION:   Vitals:    02/18/19 1059   BP: 106/68   Pulse: 89       Physical Exam:  Vitals reviewed.    Constitutional: She appears well-developed and well-nourished.     Eyes: Pupils are equal, round, and reactive to light.  Conjunctivae and EOM are normal.     Cardiovascular: Normal distal pulses and no edema.     Abdominal: Soft.     Skin: Skin displays no rash on trunk and no rash on extremities. Skin displays no lesions on trunk and no lesions on extremities.     Psych/Behavior: She is alert. She is oriented to person, place, and time. She has a normal mood and affect.     Musculoskeletal:        Neck: Range of motion is full.     Neurological:        DTRs: Tricep reflexes are 2+ on the right side and 2+ on the left side. Bicep reflexes are 2+ on the right side and 2+ on the left side. Brachioradialis reflexes are 2+ on the right side and 2+ on the left side. Patellar reflexes are 2+ on the right side and 2+ on the left side. Achilles reflexes are 2+ on the right side and 2+ on the left side.       Back Exam     Tenderness   The patient is experiencing tenderness in the lumbar.    Range of Motion   Extension: normal   Flexion: normal   Lateral bend right: normal   Lateral bend left: normal   Rotation right: normal   Rotation left: normal     Muscle Strength   Right Quadriceps:  5/5   Left Quadriceps:  5/5   Right Hamstrings:  5/5   Left Hamstrings:  5/5     Tests   Straight leg raise right: negative  Straight leg raise left: negative    Other   Toe walk: normal  Heel walk: normal            SI joint:   Palpation at the right and left SI joints not painful      Neurologic Exam     Mental Status   Oriented to person, place, and time.   Speech: speech is normal   Level of consciousness: alert    Cranial Nerves   Cranial nerves II through XII intact.     CN III, IV, VI   Pupils are equal, round, and reactive to light.  Extraocular motions are normal.     Motor Exam   Muscle bulk: normal  Overall muscle tone: normal    Strength   Right deltoid: 5/5  Left deltoid: 5/5  Right biceps: 5/5  Left biceps: 5/5  Right triceps: 5/5  Left triceps: 5/5  Right wrist flexion: 5/5  Left wrist flexion: 5/5  Right wrist extension: 5/5  Left wrist  extension: 5/5  Right interossei: 5/5  Left interossei: 5/5  Right iliopsoas: 5/5  Left iliopsoas: 5/5  Right quadriceps: 5/5  Left quadriceps: 5/5  Right hamstrin/5  Left hamstrin/5  Right anterior tibial: 5/5  Left anterior tibial: 5/5  Right posterior tibial: 5/5  Left posterior tibial: 5/5  Right peroneal: 5/5  Left peroneal: 5/5  Right gastroc: 5/5  Left gastroc: 5/5    Sensory Exam   Light touch normal.   Pinprick normal.     Gait, Coordination, and Reflexes     Gait  Gait: normal    Coordination   Finger to nose coordination: normal  Tandem walking coordination: normal    Reflexes   Right brachioradialis: 2+  Left brachioradialis: 2+  Right biceps: 2+  Left biceps: 2+  Right triceps: 2+  Left triceps: 2+  Right patellar: 2+  Left patellar: 2+  Right achilles: 2+  Left achilles: 2+  Right plantar: normal  Left plantar: normal  Right Dean: absent  Left Dean: absent  Right ankle clonus: absent  Left ankle clonus: absent        DIAGNOSTIC DATA:  Lumbar XR    ASSESMENT:  This is a 68 y.o. female with mechanical back pain    Problem List Items Addressed This Visit     None      Visit Diagnoses     Lumbago of lumbar region with sciatica    -  Primary    Relevant Orders    X-Ray Lumbar Complete With Flex And Ext          PLAN:  Lumbar flex/ex  Request outside MRI scan  Continue water aerobic      Omar Lerner MD  Cell:[phone]

## 2019-02-18 NOTE — LETTER
February 18, 2019      Juaquin Alves MD  [address]  [place] [address]           [place] [facility]  [address]  [place] [address]  Phone: [phone]          Patient: Maddy Lopez   MR Number: [id number]   YOB: 1950   Date of Visit: 2/18/2019       Dear Dr. Juaquin Alves:    Thank you for referring Maddy Lopez to me for evaluation. Attached you will find relevant portions of my assessment and plan of care.    If you have questions, please do not hesitate to call me. I look forward to following Maddy Lopez along with you.    Sincerely,    Omar Lerner MD    Enclosure  CC:  No Recipients    If you would like to receive this communication electronically, please contact [email or web address] or [phone] to request more information on [employer] access.    For providers and/or their staff who would like to refer a patient to [facility], please contact us through our one-stop-shop provider referral line, [facility], at [phone].    If you feel you have received this communication in error or would no longer like to receive these types of communications, please e-mail [email or web address]

## 2019-02-19 ENCOUNTER — PATIENT MESSAGE (OUTPATIENT)
Dept: INTERNAL MEDICINE | Facility: CLINIC | Age: 69
End: 2019-02-19

## 2019-02-25 ENCOUNTER — TELEPHONE (OUTPATIENT)
Dept: NEUROSURGERY | Facility: CLINIC | Age: 69
End: 2019-02-25

## 2019-02-25 NOTE — TELEPHONE ENCOUNTER
Spoke to the patient instructed on dr ojeda message and patient would like to be seen. Appointment made.

## 2019-02-25 NOTE — TELEPHONE ENCOUNTER
----- Message from Susan Franklin sent at 2/25/2019  1:43 PM CST -----  Contact: self, [phone]  Patient called in returning your call. Please advise.

## 2019-02-25 NOTE — TELEPHONE ENCOUNTER
----- Message from Omar Lerner MD sent at 2/25/2019  1:27 PM CST -----  Please call her or to let her know  That I have reviewed her lumbar spine MRI and her x-ray results.  She does have a L4-5 spondylolisthesis.  If her pain is increasing in the future she can schedule another appointment with me to discuss further treatments.

## 2019-03-18 ENCOUNTER — OFFICE VISIT (OUTPATIENT)
Dept: NEUROSURGERY | Facility: CLINIC | Age: 69
End: 2019-03-18
Payer: MEDICARE

## 2019-03-18 VITALS
TEMPERATURE: 98 F | WEIGHT: 136.44 LBS | SYSTOLIC BLOOD PRESSURE: 102 MMHG | HEIGHT: 64 IN | HEART RATE: 92 BPM | DIASTOLIC BLOOD PRESSURE: 65 MMHG | BODY MASS INDEX: 23.29 KG/M2

## 2019-03-18 DIAGNOSIS — M48.061 STENOSIS OF LATERAL RECESS OF LUMBAR SPINE: ICD-10-CM

## 2019-03-18 DIAGNOSIS — M43.16 SPONDYLOLISTHESIS AT L4-L5 LEVEL: Primary | ICD-10-CM

## 2019-03-18 PROCEDURE — 99213 OFFICE O/P EST LOW 20 MIN: CPT | Mod: PBBFAC,PN | Performed by: NEUROLOGICAL SURGERY

## 2019-03-18 PROCEDURE — 99212 OFFICE O/P EST SF 10 MIN: CPT | Mod: S$PBB,,, | Performed by: NEUROLOGICAL SURGERY

## 2019-03-18 PROCEDURE — 99999 PR PBB SHADOW E&M-EST. PATIENT-LVL III: ICD-10-PCS | Mod: PBBFAC,,, | Performed by: NEUROLOGICAL SURGERY

## 2019-03-18 PROCEDURE — 99212 PR OFFICE/OUTPT VISIT, EST, LEVL II, 10-19 MIN: ICD-10-PCS | Mod: S$PBB,,, | Performed by: NEUROLOGICAL SURGERY

## 2019-03-18 PROCEDURE — 99999 PR PBB SHADOW E&M-EST. PATIENT-LVL III: CPT | Mod: PBBFAC,,, | Performed by: NEUROLOGICAL SURGERY

## 2019-03-18 NOTE — PROGRESS NOTES
NEUROSURGICAL PROGRESS NOTE    DATE OF SERVICE:  03/18/2019    ATTENDING PHYSICIAN:  Omar Lerner MD    SUBJECTIVE:    INTERIM HISTORY:    This is a very pleasant 68 y.o. female, who has intermittent low back pain.  She had a spinal epidural injection that improved her pain on left side if she still has intermittent right buttock pain only when she stands for prolonged period of time.  The pain does not radiate below the buttock.  She is not significantly limited by the pain.  She does water aerobic.  She is not interested in wearing a back brace.  Denies having weakness numbness or sphincter dysfunction symptoms.    Low Back Pain Scale  R Low Back-Pain Score: 8  R Low Back-Pain Intensity: The pain is bad, but I manage without taking pain killers  R Low Back-Pain Score: I can look after myself normally without causing extra pain  Low Back-Lifting: I can lift heavy weights without extra pain   Low Back-Walking: Pain does not prevent me from walking any distance   Low Back-Sitting: I can sit in any chair as long as I like   Low Back-Standing: I cannot stand for longer than 1 hour without increasing pain   Low Back-Sleeping: I have no pain in bed   Low Back-Social Life: My social life is normal and give me no pain   Low Back-Traveling: I have some pain when traveling but piedad of my usual forms of travel make it any worse   Low Back-Changing Degree of Pain: (My pain is neither getting better or worse )         PAST MEDICAL HISTORY:  Active Ambulatory Problems     Diagnosis Date Noted    Chronic headache disorder 11/12/2018    Hyperlipidemia 11/12/2018    Degeneration of lumbar/lumbosacral disc without myelopathy 01/07/2019    Pain of left upper extremity 01/14/2019    Weakness 01/14/2019     Resolved Ambulatory Problems     Diagnosis Date Noted    Screening for colon cancer 10/24/2018     Past Medical History:   Diagnosis Date    Arthritis     Fever blister     Hypertension        PAST SURGICAL HISTORY:  Past  Surgical History:   Procedure Laterality Date    COLONOSCOPY/Golytely N/A 10/24/2018    [facility]    LEVATOR DEHISCENCE Bilateral 2013       SOCIAL HISTORY:   Social History     Socioeconomic History    Marital status: Single     Spouse name: Not on file    Number of children: Not on file    Years of education: Not on file    Highest education level: Not on file   Social Needs    Financial resource strain: Not on file    Food insecurity - worry: Not on file    Food insecurity - inability: Not on file    Transportation needs - medical: Not on file    Transportation needs - non-medical: Not on file   Occupational History    Not on file   Tobacco Use    Smoking status: Never Smoker    Smokeless tobacco: Never Used   Substance and Sexual Activity    Alcohol use: No     Frequency: Never    Drug use: No    Sexual activity: No   Other Topics Concern    Are you pregnant or think you may be? Not Asked    Breast-feeding Not Asked   Social History Narrative    Not on file       FAMILY HISTORY:  Family History   Problem Relation Age of Onset    Macular degeneration Mother     Blindness Mother     Diabetes Mother     Hypertension Mother     Glaucoma Father     Blindness Father     Cancer Father     Amblyopia Neg Hx     Cataracts Neg Hx     Retinal detachment Neg Hx     Strabismus Neg Hx     Stroke Neg Hx     Thyroid disease Neg Hx     Melanoma Neg Hx        CURRENTS MEDICATIONS:  Current Outpatient Medications on File Prior to Visit   Medication Sig Dispense Refill    atorvastatin (LIPITOR) 40 MG tablet Take 1 tablet (40 mg total) by mouth once daily. 90 tablet 1    cholecalciferol, vitamin D3, (VITAMIN D3) 2,000 unit Cap Take 1 capsule by mouth once daily.      fluocinonide (LIDEX) 0.05 % external solution AAA scalp qhs 60 mL 2    frovatriptan (FROVA) 2.5 MG tablet Take 1 tablet (2.5 mg total) by mouth as needed for Migraine. If recurs, may repeat  once after 2 hours. Don't exceed 3 per week. 12 tablet 5    ketoconazole (NIZORAL) 2 % shampoo Wash hair with medicated shampoo at least 2x/week - let sit on scalp at least 5 minutes prior to rinsing 120 mL 5    propranolol (INDERAL) 10 MG tablet Take 1 tablet (10 mg total) by mouth 2 (two) times daily. 180 tablet 3    venlafaxine (EFFEXOR-XR) 150 MG Cp24 Take 1 capsule (150 mg total) by mouth once daily. Along with 75 mg tablet.  Total daily dose to 225 mg 90 capsule 1    venlafaxine (EFFEXOR-XR) 75 MG 24 hr capsule Take 1 capsule (75 mg total) by mouth once daily. Along with 150 mg tablet.  Total daily dose to 225 mg 90 capsule 1     Current Facility-Administered Medications on File Prior to Visit   Medication Dose Route Frequency Provider Last Rate Last Dose    0.9%  NaCl infusion   Intravenous Continuous Natty Bradshaw MD   Stopped at 10/24/18 1327    sodium chloride 0.9% flush 3 mL  3 mL Intravenous PRN Natty Bradshaw MD           ALLERGIES:  Review of patient's allergies indicates:   Allergen Reactions    Aspirin Other (See Comments)     Causes bruising         REVIEW OF SYSTEMS:  Review of Systems   Constitutional: Negative for diaphoresis, fever and weight loss.   Respiratory: Negative for shortness of breath.    Cardiovascular: Negative for chest pain.   Gastrointestinal: Negative for blood in stool.   Genitourinary: Negative for hematuria.   Endo/Heme/Allergies: Does not bruise/bleed easily.   All other systems reviewed and are negative.        OBJECTIVE:    PHYSICAL EXAMINATION:   Vitals:    03/18/19 0913   BP: 102/65   Pulse: 92   Temp: 98.1 °F (36.7 °C)       Physical Exam:  Vitals reviewed.    Constitutional: She appears well-developed and well-nourished.     Eyes: Pupils are equal, round, and reactive to light. Conjunctivae and EOM are normal.     Cardiovascular: Normal distal pulses and no edema.     Abdominal: Soft.     Skin: Skin displays no rash on trunk and no rash on  extremities. Skin displays no lesions on trunk and no lesions on extremities.     Psych/Behavior: She is alert. She is oriented to person, place, and time. She has a normal mood and affect.     Musculoskeletal:        Neck: Range of motion is full.     Neurological:        DTRs: Tricep reflexes are 2+ on the right side and 2+ on the left side. Bicep reflexes are 2+ on the right side and 2+ on the left side. Brachioradialis reflexes are 2+ on the right side and 2+ on the left side. Patellar reflexes are 2+ on the right side and 2+ on the left side. Achilles reflexes are 2+ on the right side and 2+ on the left side.       Back Exam     Tenderness   The patient is experiencing no tenderness.     Range of Motion   Extension: normal   Flexion: normal   Lateral bend right: normal   Lateral bend left: normal   Rotation right: normal   Rotation left: normal     Muscle Strength   Right Quadriceps:  5/5   Left Quadriceps:  5/5   Right Hamstrings:  5/5   Left Hamstrings:  5/5     Tests   Straight leg raise right: negative  Straight leg raise left: negative    Other   Toe walk: normal  Heel walk: normal            SI joint:   Palpation at the right and left SI joints not painful  ARLEEN test is negative bilaterally  Gaenslen test is negative bilaterally  Thigh thrust test is negative bilaterally    Neurologic Exam     Mental Status   Oriented to person, place, and time.   Speech: speech is normal   Level of consciousness: alert    Cranial Nerves   Cranial nerves II through XII intact.     CN III, IV, VI   Pupils are equal, round, and reactive to light.  Extraocular motions are normal.     Motor Exam   Muscle bulk: normal  Overall muscle tone: normal    Strength   Right deltoid: 5/5  Left deltoid: 5/5  Right biceps: 5/5  Left biceps: 5/5  Right triceps: 5/5  Left triceps: 5/5  Right wrist flexion: 5/5  Left wrist flexion: 5/5  Right wrist extension: 5/5  Left wrist extension: 5/5  Right interossei: 5/5  Left interossei:  5/5  Right iliopsoas: 5/5  Left iliopsoas: 5/5  Right quadriceps: 5/5  Left quadriceps: 5/5  Right hamstrin/5  Left hamstrin/5  Right anterior tibial: 5/5  Left anterior tibial: 5/5  Right posterior tibial: 5/5  Left posterior tibial: 5/5  Right peroneal: 5/5  Left peroneal: 5/5  Right gastroc: 5/5  Left gastroc: 5/5    Sensory Exam   Light touch normal.   Pinprick normal.     Gait, Coordination, and Reflexes     Gait  Gait: normal    Coordination   Finger to nose coordination: normal  Tandem walking coordination: normal    Reflexes   Right brachioradialis: 2+  Left brachioradialis: 2+  Right biceps: 2+  Left biceps: 2+  Right triceps: 2+  Left triceps: 2+  Right patellar: 2+  Left patellar: 2+  Right achilles: 2+  Left achilles: 2+  Right plantar: normal  Left plantar: normal  Right Dean: absent  Left Dean: absent  Right ankle clonus: absent  Left ankle clonus: absent        DIAGNOSTIC DATA:  I personally interpreted the following imaging:   Lumbar spine MRI 2018:  L4-5 grade 1 degenerative spondylolisthesis with right moderate lateral recess stenosis  Lumbar x-ray 2019:  L4-5 grade 1 spondylolisthesis without dynamic instability    ASSESMENT:  This is a 68 y.o. female with     Problem List Items Addressed This Visit     None      Visit Diagnoses     Spondylolisthesis at L4-L5 level    -  Primary    Stenosis of lateral recess of lumbar spine                PLAN:  Stay active  Lumbar back brace p.r.n.  Will follow-up as needed if her pain gets worse  All questions answered          Omar Lerner MD  Cell:[phone]

## 2019-04-08 ENCOUNTER — LAB VISIT (OUTPATIENT)
Dept: LAB | Facility: HOSPITAL | Age: 69
End: 2019-04-08
Attending: INTERNAL MEDICINE
Payer: MEDICARE

## 2019-04-08 DIAGNOSIS — E55.9 VITAMIN D INSUFFICIENCY: ICD-10-CM

## 2019-04-08 DIAGNOSIS — E78.00 PURE HYPERCHOLESTEROLEMIA: ICD-10-CM

## 2019-04-08 DIAGNOSIS — Z00.00 ANNUAL PHYSICAL EXAM: ICD-10-CM

## 2019-04-08 LAB
25(OH)D3+25(OH)D2 SERPL-MCNC: 45 NG/ML (ref 30–96)
ALBUMIN SERPL BCP-MCNC: 3.6 G/DL (ref 3.5–5.2)
ALP SERPL-CCNC: 155 U/L (ref 55–135)
ALT SERPL W/O P-5'-P-CCNC: 22 U/L (ref 10–44)
ANION GAP SERPL CALC-SCNC: 8 MMOL/L (ref 8–16)
AST SERPL-CCNC: 17 U/L (ref 10–40)
BILIRUB SERPL-MCNC: 0.3 MG/DL (ref 0.1–1)
BUN SERPL-MCNC: 8 MG/DL (ref 8–23)
CALCIUM SERPL-MCNC: 9.4 MG/DL (ref 8.7–10.5)
CHLORIDE SERPL-SCNC: 107 MMOL/L (ref 95–110)
CHOLEST SERPL-MCNC: 173 MG/DL (ref 120–199)
CHOLEST/HDLC SERPL: 4 {RATIO} (ref 2–5)
CO2 SERPL-SCNC: 27 MMOL/L (ref 23–29)
CREAT SERPL-MCNC: 1 MG/DL (ref 0.5–1.4)
EST. GFR  (AFRICAN AMERICAN): >60 ML/MIN/1.73 M^2
EST. GFR  (NON AFRICAN AMERICAN): 58.8 ML/MIN/1.73 M^2
GLUCOSE SERPL-MCNC: 134 MG/DL (ref 70–110)
HDLC SERPL-MCNC: 43 MG/DL (ref 40–75)
HDLC SERPL: 24.9 % (ref 20–50)
LDLC SERPL CALC-MCNC: 100.6 MG/DL (ref 63–159)
NONHDLC SERPL-MCNC: 130 MG/DL
POTASSIUM SERPL-SCNC: 3.8 MMOL/L (ref 3.5–5.1)
PROT SERPL-MCNC: 6.6 G/DL (ref 6–8.4)
SODIUM SERPL-SCNC: 142 MMOL/L (ref 136–145)
TRIGL SERPL-MCNC: 147 MG/DL (ref 30–150)

## 2019-04-08 PROCEDURE — 80061 LIPID PANEL: CPT

## 2019-04-08 PROCEDURE — 80053 COMPREHEN METABOLIC PANEL: CPT

## 2019-04-08 PROCEDURE — 36415 COLL VENOUS BLD VENIPUNCTURE: CPT | Mod: PO

## 2019-04-08 PROCEDURE — 82306 VITAMIN D 25 HYDROXY: CPT

## 2019-04-10 ENCOUNTER — OFFICE VISIT (OUTPATIENT)
Dept: INTERNAL MEDICINE | Facility: CLINIC | Age: 69
End: 2019-04-10
Payer: MEDICARE

## 2019-04-10 VITALS
BODY MASS INDEX: 23.34 KG/M2 | HEIGHT: 64 IN | HEART RATE: 80 BPM | WEIGHT: 136.69 LBS | OXYGEN SATURATION: 99 % | SYSTOLIC BLOOD PRESSURE: 130 MMHG | DIASTOLIC BLOOD PRESSURE: 78 MMHG

## 2019-04-10 DIAGNOSIS — R51.9 CHRONIC NONINTRACTABLE HEADACHE, UNSPECIFIED HEADACHE TYPE: ICD-10-CM

## 2019-04-10 DIAGNOSIS — G89.29 CHRONIC NONINTRACTABLE HEADACHE, UNSPECIFIED HEADACHE TYPE: ICD-10-CM

## 2019-04-10 DIAGNOSIS — R73.9 HYPERGLYCEMIA: ICD-10-CM

## 2019-04-10 DIAGNOSIS — E78.5 HYPERLIPIDEMIA, UNSPECIFIED HYPERLIPIDEMIA TYPE: ICD-10-CM

## 2019-04-10 DIAGNOSIS — M40.50 LORDOSIS: Primary | ICD-10-CM

## 2019-04-10 PROBLEM — R53.1 WEAKNESS: Status: RESOLVED | Noted: 2019-01-14 | Resolved: 2019-04-10

## 2019-04-10 PROCEDURE — 99214 OFFICE O/P EST MOD 30 MIN: CPT | Mod: S$PBB,,, | Performed by: INTERNAL MEDICINE

## 2019-04-10 PROCEDURE — 99999 PR PBB SHADOW E&M-EST. PATIENT-LVL IV: CPT | Mod: PBBFAC,,, | Performed by: INTERNAL MEDICINE

## 2019-04-10 PROCEDURE — 99999 PR PBB SHADOW E&M-EST. PATIENT-LVL IV: ICD-10-PCS | Mod: PBBFAC,,, | Performed by: INTERNAL MEDICINE

## 2019-04-10 PROCEDURE — 99214 PR OFFICE/OUTPT VISIT, EST, LEVL IV, 30-39 MIN: ICD-10-PCS | Mod: S$PBB,,, | Performed by: INTERNAL MEDICINE

## 2019-04-10 PROCEDURE — 99214 OFFICE O/P EST MOD 30 MIN: CPT | Mod: PBBFAC,PO | Performed by: INTERNAL MEDICINE

## 2019-04-10 RX ORDER — PROPRANOLOL HYDROCHLORIDE 20 MG/1
20 TABLET ORAL 2 TIMES DAILY
Qty: 180 TABLET | Refills: 0 | Status: SHIPPED | OUTPATIENT
Start: 2019-04-10 | End: 2019-07-01 | Stop reason: SDUPTHER

## 2019-04-10 NOTE — PROGRESS NOTES
Portions of this note are generated with voice recognition software. Typographical errors may exist.     SUBJECTIVE:    This is a/an 68 y.o. female here for primary care visit for  Chief Complaint   Patient presents with    Hyperlipidemia     6m follow up     Migraine     Patient states that she has been having the same frequency of migraines despite starting propranolol 10 mg twice daily.  The patient has been fearful of side effects which is perhaps why she was started on such a low dosage.  The patient states that on the current dosage she isn't having any unusual lightheadedness.  She is in agreement with increasing the dosage in following up with Neurology for further titration.  She finds that Triptan medicine does help with migraines when they do occur but they are occurring frequently    Patient states that she has had a reluctance to pursue core strengthening exercises because she does not have a technique that allows her to do this without causing significant lumbago.  The patient lacks an understanding of modified exercises for core strengthening.  She would like to pursue some brief physical therapy to learn these techniques so that she can reduce lordosis and the frequency of pain that she has due to significant lumbosacral stenosis    Patient states that she is complying with her statin medication and voices no adverse effects.    Patient states that she has had significant dietary indiscretions for the past 3 months and is motivated to change dietary habits and increase physical activity      Medications Reviewed and Updated    Past medical, family, and social histories were reviewed and updated.    Review of Systems negative unless otherwise noted in history of present illness-  ROS    General ROS: negative  Psychological ROS: negative  Endocrine ROS: Negative  Allergy and Immunology ROS: negative  Cardiovascular ROS: negative  Pulmonary ROS: Negative  Gastrointestinal ROS: negative  Genito-Urinary  ROS: negative  Musculoskeletal ROS: negative  Neurological ROS: negative    Allergic:    Review of patient's allergies indicates:   Allergen Reactions    Aspirin Other (See Comments)     Causes bruising         OBJECTIVE:  BP: 130/78 Pulse: 80    Wt Readings from Last 3 Encounters:   04/10/19 62 kg (136 lb 11 oz)   03/18/19 61.9 kg (136 lb 7.4 oz)   02/13/19 61.1 kg (134 lb 11.2 oz)    Body mass index is 23.46 kg/m².  Previous Blood Pressure Readings :   BP Readings from Last 3 Encounters:   04/10/19 130/78   03/18/19 102/65   02/18/19 106/68       Physical Exam    GEN: No apparent distress  HEENT: sclera non-icteric, conjunctiva clear  CV: no peripheral edema regular rate and rhythm.  No murmurs.  PULM: breathing non-labored  ABD: non, protuberant abdomen.  PSYCH: appropriate affect  MSK: able to rise from chair without assistance  SKIN: normal skin turgor    Pertinent Labs Reviewed       ASSESSMENT/PLAN:    Lordosis  -     Ambulatory Referral to Physical/Occupational Therapy    Hyperglycemia  -     Hemoglobin A1c; Standing    Chronic nonintractable headache, unspecified headache type    Hyperlipidemia, unspecified hyperlipidemia type    Other orders  -     propranolol (INDERAL) 20 MG tablet; Take 1 tablet (20 mg total) by mouth 2 (two) times daily.  Dispense: 180 tablet; Refill: 0          Future Appointments   Date Time Provider Department Center   5/24/2019 10:15 AM Trisha Siu MD [facility] DERM [facility]   9/6/2019  9:30 AM LAB, [facility] [place]   9/11/2019  8:40 AM Juaquin Alves MD \[facility] [place] [facility]\"" [facility]  4/10/2019  12:03 PM

## 2019-04-10 NOTE — PATIENT INSTRUCTIONS
Recommendations for today    We recommend that you attend physical therapy so that you can learn the appropriate technique for strengthening core muscles of the lower back    We recommend increasing the dosage of propranolol so that you can get closer to the guideline recommendations for the prevention of migraine.  20 mg twice daily should be started as soon as possible.    If mild side effects develop on propranolol 20 mg simply continue taking the medication.  Mild side effects tend to go away after taking the medication for 14 days.  If side effects persist or worsen reduce the dosage to 10 mg twice daily and contact the office of Dr. Lebron

## 2019-04-11 ENCOUNTER — CLINICAL SUPPORT (OUTPATIENT)
Dept: REHABILITATION | Facility: HOSPITAL | Age: 69
End: 2019-04-11
Payer: MEDICARE

## 2019-04-11 DIAGNOSIS — M54.50 CHRONIC BILATERAL LOW BACK PAIN WITHOUT SCIATICA: ICD-10-CM

## 2019-04-11 DIAGNOSIS — M51.37 DEGENERATION OF LUMBAR/LUMBOSACRAL DISC WITHOUT MYELOPATHY: Primary | ICD-10-CM

## 2019-04-11 DIAGNOSIS — G89.29 CHRONIC BILATERAL LOW BACK PAIN WITHOUT SCIATICA: ICD-10-CM

## 2019-04-11 PROCEDURE — G8978 MOBILITY CURRENT STATUS: HCPCS | Mod: CI,PN | Performed by: PHYSICAL THERAPIST

## 2019-04-11 PROCEDURE — 97110 THERAPEUTIC EXERCISES: CPT | Mod: PN | Performed by: PHYSICAL THERAPIST

## 2019-04-11 PROCEDURE — 97162 PT EVAL MOD COMPLEX 30 MIN: CPT | Mod: PN | Performed by: PHYSICAL THERAPIST

## 2019-04-11 PROCEDURE — G8979 MOBILITY GOAL STATUS: HCPCS | Mod: CH,PN | Performed by: PHYSICAL THERAPIST

## 2019-04-11 PROCEDURE — 97140 MANUAL THERAPY 1/> REGIONS: CPT | Mod: PN | Performed by: PHYSICAL THERAPIST

## 2019-04-11 NOTE — PATIENT INSTRUCTIONS
Posture - Sitting    Sit upright, head facing forward. Scoot your tailbone all the way to the back of your chair. Lean slightly forward and place a rolled up towel at your waist. Lean back so shoulder blades lightly touch the back of the chair. Keep shoulders relaxed, and avoid rounded back. Keep hips level with knees. Avoid crossing legs for long periods.     Pelvic Tilt    Flatten back by tightening stomach muscles and buttocks. Do not hold your breath.  Hold 5 seconds.  Repeat 30 times per set. Do 1 sets per session. Do 1 sessions per day.      Bridging    Flatten back against floor then slowly raise buttocks from floor, keeping stomach tight. Hold 2 seconds.  Repeat 12 times per set. Do 2 sets per session. Do 1 sessions per day.      Bent Leg Lift (Hook-Lying)    Tighten stomach and slowly raise right leg from floor. Keep trunk rigid. Slowly lower and switch sides.  Repeat 30 times per set. Do 1 sets per session. Do 1 sessions per day.      Strengthening: Hip Adduction - Isometric    Lie flat with ball or folded pillow between knees, then squeeze knees together. Hold 5 seconds.  Repeat 30 times per set. Do 1 sets per session. Do 1 sessions per day.       Strengthening: Hip Abductor - Resisted    Lie flat with band looped around both legs above knees, and push thighs apart, ensuring right and left move together.  Repeat 30 times per set. Do 1 sets per session. Do 1 sessions per day.      Slow Stand to Sit    Stand up, using hands if needed. Keep chest and head upright, bend your knees, don't use hands, and slowly lower back to the chair. Move as slowly as you can.  Repeat 12 times per set. Do 2 sets per session. Do 1 sessions per day.    Copyright © [employer]. All rights reserved.

## 2019-04-11 NOTE — PLAN OF CARE
[facility]  Physical Therapy Initial Evaluation    Name: Maddy Lopez  Clinic Number: [id number]    Therapy Diagnosis:   Encounter Diagnoses   Name Primary?    Chronic bilateral low back pain without sciatica     Degeneration of lumbar/lumbosacral disc without myelopathy Yes     Physician: Juaquin Alves*    Physician Orders: PT Eval and Treat   Medical Diagnosis from Referral: M40.50 (ICD-10-CM) - Lordosis  Evaluation Date: 4/11/2019  Authorization Period Expiration: 12/31/2019  Plan of Care Expiration: 5/22/19  Visit # / Visits authorized: 1/ 50  FOTO: 1/10    Gcode: 1/10  Visit:  170.98  Total: 170.98    Time In: 100 pm  Time Out: 155 pm  Total Billable Time: 55 minutes   Precautions: Standard    Subjective   Date of onset: 25 years  History of current condition - Maddy reports: R side worse than L side, water aerobics regularly. Every now and then sharp down legs, doesn't last very long. No numbness, tingling.      Medical History:   Past Medical History:   Diagnosis Date    Arthritis     Fever blister     Hypertension        Surgical History:   Maddy Lopez  has a past surgical history that includes Levator Dehiscence (Bilateral, 2013) and Colonoscopy (N/A, 10/24/2018).    Medications:   Maddy has a current medication list which includes the following prescription(s): atorvastatin, cholecalciferol (vitamin d3), fluocinonide, frovatriptan, ketoconazole, propranolol, venlafaxine, and venlafaxine.    Allergies:   Review of patient's allergies indicates:   Allergen Reactions    Aspirin Other (See Comments)     Causes bruising          Imaging, xrays show facet joint arthropathy    Prior Therapy: Yes, in different state, multiple years ago   Social History:  lives alone  Occupation: Retired  Prior Level of Function: Able to perform ADL's without pain.  Current Level of Function: Unable to perform ADL's without right sided low back / hip pain    Pain:  Current  3/10, worst 6/10, best 0/10   Location: right trunk and hip   Description: Aching, Dull, Sharp, Electric and Shooting  Aggravating Factors: Standing, Bending, Lifting and Getting out of bed/chair  Easing Factors: rest    Pts goals: To be able to perform exercise without pain    Objective     Posture: Increased lumbar lordosis  Palpation: Severe to moderate tenderness along mid belly of piriformis on R side that runs down to R greater trochanter.   Sensation: no deficits noted    Range of Motion/Strength:     Thoracolumbar AROM Pain/Dysfunction with Movement   Flexion WNL    Extension Limited Minimal stiffness and pain at end range   Right side bending WNL    Left side bending WNL        Hip Right Left Pain/Dysfunction with Movement   AROM/PROM WNL WNL    Internal rotation  WNL WNL Pain on right at end range         L/E MMT Right Left Pain/Dysfunction with Movement   Hip Flexion 5/5 5/5    Hip Extension 5/5 5/5    Hip Abduction 4/5 4/5    Hip Adduction 5/5 5/5    Knee Flexion 5/5 5/5    Knee Extension 5/5 5/5    Ankle DF 5/5 5/5    Ankle PF 5/5 5/5        Joint Mobility:   - Lumbar: Stiffness noted in extension of lumbar spine, minimal discomfort.       Flexibility: Full hip flexibility and ROM with no pain, lumbar motion difficult with extension and rotation.     Special Tests: SLR (-) bilaterally      CMS Impairment/Limitation/Restriction for FOTO Lumbar Survey    Therapist reviewed FOTO scores for Maddy Lopez on 4/11/2019.   FOTO documents entered into [email or web address] - see Media section.    Limitation Score: 6%  Category: Body Position    Current : CI = at least 1% but < 20% impaired, limited or restricted  Goal: CI = at least 1% but < 20% impaired, limited or restricted         TREATMENT   Treatment Time In: 115  Treatment Time Out: 155  Total Treatment time separate from Evaluation: 40 minutes    Maddy received therapeutic exercises to develop strength, endurance, posture and core stabilization for 25 minutes  "including:  Abdominal Bracing    5" hold, 30x  Bracing with marching   4 marches, 30x  Brace with supine abduction with belt 5" hold, 30x  Brace with supine adduction with ball  5" hold, 30x  Mini Bridges     2x15  Standing punch outs    Purple SC 2x10    Maddy received the following manual therapy techniques: Soft tissue Mobilization were applied to the: lumbar spine and right lateral hip for 15 minutes, including:    ASTYM to lumbar spine and right lateral hip      Home Exercises Provided and Patient Education Provided     Education provided:   - HEP provided to work on core stability at home    Written Home Exercises Provided: yes.  Exercises were reviewed and Maddy was able to demonstrate them prior to the end of the session.  Maddy demonstrated good  understanding of the education provided.     See EMR under Patient Instructions for exercises provided 4/11/2019.      Assessment   Maddy is a 68 y.o. female referred to outpatient Physical Therapy with a medical diagnosis of increased lumbar lordosis with low back pain. Pt presents with increased lumbar lordosis leading to low back and piriformis pain. Pt reports more posterior and lateral hip pain at times and is moderately tender to the touch. Improved stiffness following ASTYM to area.     Pt prognosis is Excellent.   Pt will benefit from skilled outpatient Physical Therapy to address the deficits stated above and in the chart below, provide pt/family education, and to maximize pt's level of independence.     Plan of care discussed with patient: Yes  Pt's spiritual, cultural and educational needs considered and patient is agreeable to the plan of care and goals as stated below:     Anticipated Barriers for therapy: None    Medical Necessity is demonstrated by the following  History  Co-morbidities and personal factors that may impact the plan of care Co-morbidities:   advanced age    Personal Factors:   no deficits     moderate   Examination  Body " "Structures and Functions, activity limitations and participation restrictions that may impact the plan of care Body Regions:   back    Body Systems:    strength    Participation Restrictions:   Lifting    Activity limitations:   Learning and applying knowledge  no deficits    General Tasks and Commands  undertaking a single task    Communication  no deficits    Mobility  walking    Self care  no deficits    Domestic Life  doing house work (cleaning house, washing dishes, laundry)    Interactions/Relationships  no deficits    Life Areas  no deficits    Community and Social Life  community life  recreation and leisure         moderate   Clinical Presentation evolving clinical presentation with changing clinical characteristics moderate   Decision Making/ Complexity Score: moderate     Goals:  Long Term Goals (6 Weeks):   1. Pt will improve FOTO score to </= 0% limited to decrease perceived limitation with maintaining/changing body position  2. Pt will perform and hold TA contraction for 10x10" in dynamic standing activities to demonstrate improved core strength  3. Pt will improve impaired LE MMTs to >/=4+/5 to improve strength for functional tasks.  4. Pt will report pain </= 2/10 during lifting activities to promote functional QOL.  5. Pt will report pain </= 2/10 during lumbar ROM to promote functional mobility.  6. Pt will be compliant with HEP to supplement PT in decreasing pain with functional mobility.  Plan   Plan of care Certification: 4/11/2019 to 5/22/19.    Outpatient Physical Therapy 1 times weekly for 6 weeks to include the following interventions: Manual Therapy, Moist Heat/ Ice, Patient Education, Self Care and Therapeutic Exercise, ASTYM, Kinesiotaping PRN, Functional Dry Needling    Allan Hernandez, PT, DPT  "

## 2019-04-16 ENCOUNTER — PATIENT MESSAGE (OUTPATIENT)
Dept: INTERNAL MEDICINE | Facility: CLINIC | Age: 69
End: 2019-04-16

## 2019-04-19 ENCOUNTER — PATIENT MESSAGE (OUTPATIENT)
Dept: INTERNAL MEDICINE | Facility: CLINIC | Age: 69
End: 2019-04-19

## 2019-04-19 RX ORDER — FROVATRIPTAN SUCCINATE 2.5 MG/1
2.5 TABLET, FILM COATED ORAL
Qty: 12 TABLET | Refills: 5 | Status: CANCELLED | OUTPATIENT
Start: 2019-04-19

## 2019-04-22 DIAGNOSIS — G43.909 MIGRAINE SYNDROME: Primary | ICD-10-CM

## 2019-04-22 RX ORDER — FROVATRIPTAN SUCCINATE 2.5 MG/1
2.5 TABLET, FILM COATED ORAL
Qty: 36 TABLET | Refills: 1 | Status: SHIPPED | OUTPATIENT
Start: 2019-04-22 | End: 2019-07-01 | Stop reason: SDUPTHER

## 2019-04-29 ENCOUNTER — OFFICE VISIT (OUTPATIENT)
Dept: OPTOMETRY | Facility: CLINIC | Age: 69
End: 2019-04-29
Payer: MEDICARE

## 2019-04-29 DIAGNOSIS — H25.13 NUCLEAR SCLEROSIS OF BOTH EYES: ICD-10-CM

## 2019-04-29 DIAGNOSIS — H52.7 REFRACTIVE ERROR: ICD-10-CM

## 2019-04-29 DIAGNOSIS — H04.123 BILATERAL DRY EYES: Primary | ICD-10-CM

## 2019-04-29 PROCEDURE — 99212 OFFICE O/P EST SF 10 MIN: CPT | Mod: PBBFAC,PO | Performed by: OPTOMETRIST

## 2019-04-29 PROCEDURE — 92015 DETERMINE REFRACTIVE STATE: CPT | Mod: ,,, | Performed by: OPTOMETRIST

## 2019-04-29 PROCEDURE — 92015 PR REFRACTION: ICD-10-PCS | Mod: ,,, | Performed by: OPTOMETRIST

## 2019-04-29 PROCEDURE — 92012 INTRM OPH EXAM EST PATIENT: CPT | Mod: S$PBB,,, | Performed by: OPTOMETRIST

## 2019-04-29 PROCEDURE — 99999 PR PBB SHADOW E&M-EST. PATIENT-LVL II: CPT | Mod: PBBFAC,,, | Performed by: OPTOMETRIST

## 2019-04-29 PROCEDURE — 99999 PR PBB SHADOW E&M-EST. PATIENT-LVL II: ICD-10-PCS | Mod: PBBFAC,,, | Performed by: OPTOMETRIST

## 2019-04-29 PROCEDURE — 92012 PR EYE EXAM, EST PATIENT,INTERMED: ICD-10-PCS | Mod: S$PBB,,, | Performed by: OPTOMETRIST

## 2019-04-29 RX ORDER — FLUOROMETHOLONE 1 MG/ML
1 SUSPENSION/ DROPS OPHTHALMIC 3 TIMES DAILY
Qty: 5 ML | Refills: 0 | Status: SHIPPED | OUTPATIENT
Start: 2019-04-29 | End: 2019-05-09

## 2019-04-29 NOTE — PROGRESS NOTES
HPI     Lost sunglasses, needs new rx  Managing fien with dry eyes  Uses drops 2x/day with relief    Last edited by Brock Donovan, OD on 4/29/2019  9:45 AM. (History)            Assessment /Plan     For exam results, see Encounter Report.    Bilateral dry eyes  -     fluorometholone 0.1% (FML) 0.1 % DrpS; Place 1 drop into both eyes 3 (three) times daily. for 10 days  Dispense: 5 mL; Refill: 0    Nuclear sclerosis of both eyes    Refractive error      1. Cont tears 2x/day with good relief, Ok rx for steroid drop for flare ups. RTC 6 mos recheck.   2. Educated pt on presence of cataracts and effects on vision. No surgery at this time. Recheck in one year.  3. New Spec Rx given. Different lens options discussed with patient. RTC 1 year full exam.

## 2019-05-01 ENCOUNTER — PATIENT MESSAGE (OUTPATIENT)
Dept: INTERNAL MEDICINE | Facility: CLINIC | Age: 69
End: 2019-05-01

## 2019-05-15 ENCOUNTER — TELEPHONE (OUTPATIENT)
Dept: DERMATOLOGY | Facility: CLINIC | Age: 69
End: 2019-05-15

## 2019-05-15 NOTE — TELEPHONE ENCOUNTER
----- Message from Zee Fields sent at 5/15/2019 12:55 PM CDT -----  Contact: Patient   Patient Returning Call from [facility]    Who Left Message for Patient: Yulisa    Communication Preference: [phone]    Additional Information: Pt would like to r/s to the 05/20 appt if it is still available.

## 2019-05-15 NOTE — TELEPHONE ENCOUNTER
Spoke to pt and confirmed appt for hair loss with NP. Pt was informed the NP specialize in hair oss.     DARRIN

## 2019-05-15 NOTE — TELEPHONE ENCOUNTER
Left pt a message letting her know that Dr. Siu will not be in clinic on May 24 but she will be in on 5/20 if she would like to be seen then. Pt was told to give us a call back if the date and time for 5/20 does not work for her.

## 2019-05-22 ENCOUNTER — LAB VISIT (OUTPATIENT)
Dept: LAB | Facility: HOSPITAL | Age: 69
End: 2019-05-22
Attending: INTERNAL MEDICINE
Payer: MEDICARE

## 2019-05-22 ENCOUNTER — OFFICE VISIT (OUTPATIENT)
Dept: INTERNAL MEDICINE | Facility: CLINIC | Age: 69
End: 2019-05-22
Payer: MEDICARE

## 2019-05-22 VITALS
WEIGHT: 136 LBS | SYSTOLIC BLOOD PRESSURE: 120 MMHG | DIASTOLIC BLOOD PRESSURE: 74 MMHG | OXYGEN SATURATION: 99 % | BODY MASS INDEX: 23.22 KG/M2 | HEIGHT: 64 IN | HEART RATE: 74 BPM

## 2019-05-22 DIAGNOSIS — Z11.59 NEED FOR HEPATITIS C SCREENING TEST: ICD-10-CM

## 2019-05-22 DIAGNOSIS — R21 RASH OF UNKNOWN CAUSE: ICD-10-CM

## 2019-05-22 DIAGNOSIS — L85.8 KERATOSIS PILARIS, ACQUIRED: Primary | ICD-10-CM

## 2019-05-22 DIAGNOSIS — Z23 NEED FOR VACCINATION AGAINST STREPTOCOCCUS PNEUMONIAE USING PNEUMOCOCCAL CONJUGATE VACCINE 13: ICD-10-CM

## 2019-05-22 LAB
BASOPHILS # BLD AUTO: 0.05 K/UL (ref 0–0.2)
BASOPHILS NFR BLD: 0.7 % (ref 0–1.9)
DIFFERENTIAL METHOD: ABNORMAL
EOSINOPHIL # BLD AUTO: 0.2 K/UL (ref 0–0.5)
EOSINOPHIL NFR BLD: 2.5 % (ref 0–8)
ERYTHROCYTE [DISTWIDTH] IN BLOOD BY AUTOMATED COUNT: 14.6 % (ref 11.5–14.5)
HCT VFR BLD AUTO: 41.3 % (ref 37–48.5)
HGB BLD-MCNC: 13.1 G/DL (ref 12–16)
IMM GRANULOCYTES # BLD AUTO: 0.06 K/UL (ref 0–0.04)
IMM GRANULOCYTES NFR BLD AUTO: 0.8 % (ref 0–0.5)
LYMPHOCYTES # BLD AUTO: 1.9 K/UL (ref 1–4.8)
LYMPHOCYTES NFR BLD: 25 % (ref 18–48)
MCH RBC QN AUTO: 29.3 PG (ref 27–31)
MCHC RBC AUTO-ENTMCNC: 31.7 G/DL (ref 32–36)
MCV RBC AUTO: 92 FL (ref 82–98)
MONOCYTES # BLD AUTO: 0.6 K/UL (ref 0.3–1)
MONOCYTES NFR BLD: 7.8 % (ref 4–15)
NEUTROPHILS # BLD AUTO: 4.8 K/UL (ref 1.8–7.7)
NEUTROPHILS NFR BLD: 63.2 % (ref 38–73)
NRBC BLD-RTO: 0 /100 WBC
PLATELET # BLD AUTO: 259 K/UL (ref 150–350)
PMV BLD AUTO: 11.6 FL (ref 9.2–12.9)
RBC # BLD AUTO: 4.47 M/UL (ref 4–5.4)
WBC # BLD AUTO: 7.53 K/UL (ref 3.9–12.7)

## 2019-05-22 PROCEDURE — 86592 SYPHILIS TEST NON-TREP QUAL: CPT

## 2019-05-22 PROCEDURE — 99214 OFFICE O/P EST MOD 30 MIN: CPT | Mod: PBBFAC,PO | Performed by: INTERNAL MEDICINE

## 2019-05-22 PROCEDURE — 99999 PR PBB SHADOW E&M-EST. PATIENT-LVL IV: CPT | Mod: PBBFAC,,, | Performed by: INTERNAL MEDICINE

## 2019-05-22 PROCEDURE — 99214 OFFICE O/P EST MOD 30 MIN: CPT | Mod: S$PBB,,, | Performed by: INTERNAL MEDICINE

## 2019-05-22 PROCEDURE — G0009 ADMIN PNEUMOCOCCAL VACCINE: HCPCS | Mod: PBBFAC,PO

## 2019-05-22 PROCEDURE — 99214 PR OFFICE/OUTPT VISIT, EST, LEVL IV, 30-39 MIN: ICD-10-PCS | Mod: S$PBB,,, | Performed by: INTERNAL MEDICINE

## 2019-05-22 PROCEDURE — 85025 COMPLETE CBC W/AUTO DIFF WBC: CPT

## 2019-05-22 PROCEDURE — 99999 PR PBB SHADOW E&M-EST. PATIENT-LVL IV: ICD-10-PCS | Mod: PBBFAC,,, | Performed by: INTERNAL MEDICINE

## 2019-05-22 PROCEDURE — 36415 COLL VENOUS BLD VENIPUNCTURE: CPT | Mod: PO

## 2019-05-22 PROCEDURE — 86803 HEPATITIS C AB TEST: CPT

## 2019-05-22 RX ORDER — TRIAMCINOLONE ACETONIDE 1 MG/G
CREAM TOPICAL 2 TIMES DAILY
Qty: 15 G | Refills: 0 | Status: SHIPPED | OUTPATIENT
Start: 2019-05-22 | End: 2020-05-27

## 2019-05-23 LAB
HCV AB SERPL QL IA: NEGATIVE
RPR SER QL: NORMAL

## 2019-05-23 NOTE — PROGRESS NOTES
Portions of this note are generated with voice recognition software. Typographical errors may exist.     SUBJECTIVE:    This is a/an 68 y.o. female here for primary care visit for  Chief Complaint   Patient presents with    Rash     Patient has had a papular rash on a recurring basis for over a year and has never had a formal dermatologic evaluation.  Location of the skin findings includes the extremities and the trunk.  Secondary findings included neurotic excoriation of the papules.  They are nonpruritic.  They are not painful.  Not associated with any conditions involving joint swelling or tenderness. Risk of STD unknown.  Patient has done minimal topical self-care.  Low potency hydrocortisone once daily applied to the papules has done very little to improve symptoms.  No recent change in medication except for statin medication the patient states that these papules preceded starting statin medication        Medications Reviewed and Updated    Past medical, family, and social histories were reviewed and updated.    Review of Systems negative unless otherwise noted in history of present illness-  ROS    General ROS: negative  Psychological ROS: negative  ENT ROS: negative  Endocrine ROS: Negative  Allergy and Immunology ROS: negative  Cardiovascular ROS: negative  Pulmonary ROS: Negative  Musculoskeletal ROS: negative  Neurological ROS: negative  Dermatological ROS: negative        Allergic:    Review of patient's allergies indicates:   Allergen Reactions    Aspirin Other (See Comments)     Causes bruising         OBJECTIVE:  BP: 120/74 Pulse: 74    Wt Readings from Last 3 Encounters:   05/22/19 61.7 kg (136 lb 0.4 oz)   04/10/19 62 kg (136 lb 11 oz)   03/18/19 61.9 kg (136 lb 7.4 oz)    Body mass index is 23.35 kg/m².  Previous Blood Pressure Readings :   BP Readings from Last 3 Encounters:   05/22/19 120/74   04/10/19 130/78   03/18/19 102/65       Physical Exam    GEN: No apparent distress  HEENT: sclera  non-icteric, conjunctiva clear  CV: no peripheral edema  PULM: breathing non-labored  ABD: non protuberant abdomen.  PSYCH: appropriate affect  MSK: able to rise from chair without assistance  SKIN: normal skin turgor papules as photographed.  Secondary changes based on excoriation.  No induration or signs of cellulitis    Pertinent Labs Reviewed       ASSESSMENT/PLAN:    Keratosis pilaris, acquired  .Condition not optimally controlled. Detailed counseling on self care measures. Plan to monitor clinically in addition to plan below or as listed on After Visit Summary.   -     triamcinolone acetonide 0.1% (KENALOG) 0.1 % cream; Apply topically 2 (two) times daily. For up to 10 days to improve redness  Dispense: 15 g; Refill: 0  -     Ambulatory referral to Dermatology    Need for vaccination against Streptococcus pneumoniae using pneumococcal conjugate vaccine 13  -     (In Office Administered) Pneumococcal Conjugate Vaccine (13 Valent) (IM)    Rash of unknown cause.Etiology unclear. Not controlled. Further evaluation warranted.  Recommendations as below or as written on After Visit Summary.   -     RPR; Future; Expected date: 05/22/2019  -     CBC auto differential; Future; Expected date: 05/22/2019  -     Ambulatory referral to Dermatology    Need for hepatitis C screening test  -     Hepatitis C antibody; Future; Expected date: 05/22/2019          Future Appointments   Date Time Provider Department Center   6/3/2019  8:40 AM Sage Lebron MD [facility]   8/26/2019  9:00 AM Kirsten Jose NP [facility] DERM [facility]   9/6/2019  9:30 AM LAB, [facility] [place]   9/11/2019  8:40 AM Juaquin Alves MD [facility]  5/25/2019  9:12 AM

## 2019-05-24 ENCOUNTER — OFFICE VISIT (OUTPATIENT)
Dept: DERMATOLOGY | Facility: CLINIC | Age: 69
End: 2019-05-24
Payer: MEDICARE

## 2019-05-24 DIAGNOSIS — L65.9 HAIR LOSS DISORDER: Primary | ICD-10-CM

## 2019-05-24 PROCEDURE — 99214 PR OFFICE/OUTPT VISIT, EST, LEVL IV, 30-39 MIN: ICD-10-PCS | Mod: S$PBB,,, | Performed by: NURSE PRACTITIONER

## 2019-05-24 PROCEDURE — 99999 PR PBB SHADOW E&M-EST. PATIENT-LVL III: CPT | Mod: PBBFAC,,, | Performed by: NURSE PRACTITIONER

## 2019-05-24 PROCEDURE — 99999 PR PBB SHADOW E&M-EST. PATIENT-LVL III: ICD-10-PCS | Mod: PBBFAC,,, | Performed by: NURSE PRACTITIONER

## 2019-05-24 PROCEDURE — 99214 OFFICE O/P EST MOD 30 MIN: CPT | Mod: S$PBB,,, | Performed by: NURSE PRACTITIONER

## 2019-05-24 PROCEDURE — 99213 OFFICE O/P EST LOW 20 MIN: CPT | Mod: PBBFAC | Performed by: NURSE PRACTITIONER

## 2019-05-24 RX ORDER — DOXYCYCLINE 100 MG/1
100 CAPSULE ORAL DAILY
Qty: 30 CAPSULE | Refills: 1 | Status: SHIPPED | OUTPATIENT
Start: 2019-05-24 | End: 2019-09-04

## 2019-05-24 RX ORDER — CLOBETASOL PROPIONATE 0.46 MG/ML
SOLUTION TOPICAL
Qty: 50 ML | Refills: 1 | Status: SHIPPED | OUTPATIENT
Start: 2019-05-24 | End: 2020-05-27

## 2019-05-24 NOTE — PATIENT INSTRUCTIONS
Minoxidil 5% use nightly to top of scalp  Clobetasol nightly to top of scalp      [email or web address]                      [employer] Professional Hair Growth Supplement  Cost: $43 for 60 tablets  Ingredients    Vitamin C (from Acerola Powder and as Ascorbic Acid), Biotin, AminoMar Marine Complex, Shark Powder, Mollusc Powder, Apple Extract Powder, Procyanidin B-2, L-Cystine, L-Methionine, Microcrystalline Cellulose, Maltodextrin, Hydroxypropyl Methyl Cellulose, Magnesium Stearate, Silicon Dioxide, Artificial Orange Flavoring, Modified Starch, Glycerol.    Allergy advice: Contains fish and shellfish, not recommended for those allergic to fish, shellfish, or seafood.    Direction for use  Recommended daily intake: 2 tablets  Use daily for a minimum of 3-6 months  Take 1 tablet in the morning and 1 tablet in the evening (with water, after eating)  Afterwards take 1-2 tablets daily as required to maintain healthy hair growth      Nutrafol Hair Growth Supplement  Cost: $68-88 for 120 capsules    Ingredients  Hydrolyzed fish collagen, ashwagandha, hyaluronic acid, bioperine, biotin, saw palmetto, amino acid blend    Allergy advice: Claims to be free from shellfish and wheat    Be cautious when using in patients on anti-platelet medications (Saw Palmetto is in ingredients)    Direction for use  Recommended daily intake: 4 capsules daily with a meal (2 per day is sufficient for most)  Use daily for a minimum of 3-6 months  Afterwards take 1-2 tablets daily as required to maintain healthy hair growth          A/G Pro Hair Growth Supplement  Cost: $18 for 180 capsules    IngredientsL-Lysine, L-Methionine, Copper, Iodine, Manganese, Potassium, Zinc, Magnesium, Vitamin-C, Vitamin B6, Amino Acid Complex    Direction for use  Recommended daily intake: 2 tablets, 3xs daily  Use daily for a minimum of 3-6 months  No comment on maintenance therapy     Biotin Hair Growth Supplement  Cost: $10 for 120  pills    Ingredients  Biotin    Allergy advice: None  Recent FDA report of falsely low/high results with intake of biotin supplements. May result in falsely low troponin levels.    Direction for use  Recommended daily intake: 2.5 mg or 2500 mcg per day  Use daily for a minimum of 3-6 months  Continued use as maintenance therapy

## 2019-05-24 NOTE — PROGRESS NOTES
"  Subjective:       Patient ID:  Maddy Lopez is a 68 y.o. female who presents for   Chief Complaint   Patient presents with    Hair Loss     Hair Loss  - Follow-up  Symptom course: unchanged (last 1/21/19)  Currently using: currently washing once every other week w/ medicated shamp; gets melinda q 2 weeks during summer. during winter, gets blow out; reports lidex solution didnt help with itching.  Affected locations: scalp  Signs / symptoms: itching (itching more since january, since noted hair loss; baseline 4-5/10)  Severity F/U: currently has hair in melinda; reports has been in melinda since feb. reports does that every summer.      Reports sis w/ hair loss & niece both have hair loss to scalp & are completely bald w/ short "fuzz"       Review of Systems   Genitourinary: Negative for irregular periods.   Skin: Negative for daily sunscreen use and activity-related sunscreen use.        Objective:    Physical Exam   Constitutional: She appears well-developed and well-nourished. No distress.   Neurological: She is alert and oriented to person, place, and time. She is not disoriented.   Psychiatric: She has a normal mood and affect.   Skin:   Areas Examined (abnormalities noted in diagram):   Scalp / Hair Palpated and Inspected  Head / Face Inspection Performed  Neck Inspection Performed  Nails and Digits Inspection Performed              Diagram Legend     Erythematous scaling macule/papule c/w actinic keratosis       Vascular papule c/w angioma      Pigmented verrucoid papule/plaque c/w seborrheic keratosis      Yellow umbilicated papule c/w sebaceous hyperplasia      Irregularly shaped tan macule c/w lentigo     1-2 mm smooth white papules consistent with Milia      Movable subcutaneous cyst with punctum c/w epidermal inclusion cyst      Subcutaneous movable cyst c/w pilar cyst      Firm pink to brown papule c/w dermatofibroma      Pedunculated fleshy papule(s) c/w skin tag(s)      Evenly pigmented macule " c/w junctional nevus     Mildly variegated pigmented, slightly irregular-bordered macule c/w mildly atypical nevus      Flesh colored to evenly pigmented papule c/w intradermal nevus       Pink pearly papule/plaque c/w basal cell carcinoma      Erythematous hyperkeratotic cursted plaque c/w SCC      Surgical scar with no sign of skin cancer recurrence      Open and closed comedones      Inflammatory papules and pustules      Verrucoid papule consistent consistent with wart     Erythematous eczematous patches and plaques     Dystrophic onycholytic nail with subungual debris c/w onychomycosis     Umbilicated papule    Erythematous-base heme-crusted tan verrucoid plaque consistent with inflamed seborrheic keratosis     Erythematous Silvery Scaling Plaque c/w Psoriasis     See annotation      Assessment / Plan:        Hair loss disorder  CCCA vs other  -     clobetasol (TEMOVATE) 0.05 % external solution; Use on scalp one - two times daily as needed for scaling or itching  Dispense: 50 mL; Refill: 1  -     doxycycline (VIBRAMYCIN) 100 MG Cap; Take 1 capsule (100 mg total) by mouth once daily.  Dispense: 30 capsule; Refill: 1    Encourage natural (chemical and heat-free) hair styles and limited styling products. D/c flat iron use, hair relaxers, and adhesives to hold wigs or hair pieces to scalp.    Encouraged hair supplement daily  Encouraged Rogaine 5% foam to affected area 1x per day. Must use consistently and if you stop using, the hair it stimulated to grow may fall out over time. Generic acceptable.     Discussed benefits and risks of doxycyline therapy including but not limited to GI discomfort, esophageal irritation/ulceration, and increased sun sensitivity. Patient was counseled to take medicine with meals and at least 1 hour before lying down.     In depth discussion of CCCA   Spent 30 minutes in coordination of care and/or consultation with patient discussing diagnosis, treatment options, risks and benefits of  each. All questions answered.           Follow up in about 3 months (around 8/24/2019).

## 2019-05-25 ENCOUNTER — PATIENT MESSAGE (OUTPATIENT)
Dept: DERMATOLOGY | Facility: CLINIC | Age: 69
End: 2019-05-25

## 2019-06-19 ENCOUNTER — OFFICE VISIT (OUTPATIENT)
Dept: NEUROLOGY | Facility: CLINIC | Age: 69
End: 2019-06-19
Payer: MEDICARE

## 2019-06-19 VITALS
BODY MASS INDEX: 23.93 KG/M2 | DIASTOLIC BLOOD PRESSURE: 69 MMHG | HEIGHT: 64 IN | HEART RATE: 91 BPM | WEIGHT: 140.19 LBS | SYSTOLIC BLOOD PRESSURE: 123 MMHG

## 2019-06-19 DIAGNOSIS — G89.29 CHRONIC NONINTRACTABLE HEADACHE, UNSPECIFIED HEADACHE TYPE: ICD-10-CM

## 2019-06-19 DIAGNOSIS — R51.9 CHRONIC NONINTRACTABLE HEADACHE, UNSPECIFIED HEADACHE TYPE: ICD-10-CM

## 2019-06-19 PROCEDURE — 99999 PR PBB SHADOW E&M-EST. PATIENT-LVL III: CPT | Mod: PBBFAC,,, | Performed by: PSYCHIATRY & NEUROLOGY

## 2019-06-19 PROCEDURE — 99999 PR PBB SHADOW E&M-EST. PATIENT-LVL III: ICD-10-PCS | Mod: PBBFAC,,, | Performed by: PSYCHIATRY & NEUROLOGY

## 2019-06-19 PROCEDURE — 99213 OFFICE O/P EST LOW 20 MIN: CPT | Mod: S$PBB,,, | Performed by: PSYCHIATRY & NEUROLOGY

## 2019-06-19 PROCEDURE — 99213 OFFICE O/P EST LOW 20 MIN: CPT | Mod: PBBFAC,PO | Performed by: PSYCHIATRY & NEUROLOGY

## 2019-06-19 PROCEDURE — 99213 PR OFFICE/OUTPT VISIT, EST, LEVL III, 20-29 MIN: ICD-10-PCS | Mod: S$PBB,,, | Performed by: PSYCHIATRY & NEUROLOGY

## 2019-06-19 RX ORDER — ZONISAMIDE 100 MG/1
100 CAPSULE ORAL DAILY
Qty: 90 CAPSULE | Refills: 3 | Status: SHIPPED | OUTPATIENT
Start: 2019-06-19 | End: 2020-03-02 | Stop reason: SDUPTHER

## 2019-06-19 NOTE — PROGRESS NOTES
[facility] NEUROLOGY  [facility] [place]    Date: 6/19/19  Patient Name: Maddy Lopez   MRN: [id number]   PCP: Juaquin Alves  Referring Provider: No ref. provider found    Assessment:   Maddy Lopez is a 68 y.o. female presenting in follow-up for migraine headache.  The patient continues to experience frequent breakthrough headaches approximately 15 days per month.  We discussed continued preventative therapies and today the patient states that she is more interested in trying zonisamide then potential injectable therapy such as botox or CGR will initiate this today and patient will keep a headache diary for the next 3 months.  Pending response patient may be willing to consider a trial of CGRP inhibitor therapy.     Plan:     Problem List Items Addressed This Visit        Neuro    Chronic headache disorder    Current Assessment & Plan     -- continue current propranalol  -- start zonisamide 100 mg nightly  -- continue frova             Sage Lebron MD  [facility]   Department of Neurology    Patient note was created using MModal Dictation.  Any errors in syntax or even information may not have been identified and edited on initial review prior to signing this note.  Subjective:        HPI:   Ms. Maddy Lopez is a 68 y.o. female presenting in follow-up for migraine headaches.  Since patient's last visit, she has begun trial of propranolol.  At 10 mg dosing she noticed no improvement in her headache frequency that has noted minor reduction in her headache frequency with an increased dose of 20 mg provided by her PCP.  She denies any side effects with this.  She does find that Frova is helping with breakthrough headaches though is not as beneficial she waits too long to take it.    Prior meds: Magnesium and riboflavin, amitriptyline, topamax, depakote, effexor, frova, propranalol    PAST MEDICAL HISTORY:  Past Medical History:   Diagnosis Date     Arthritis     Fever blister     Hypertension        PAST SURGICAL HISTORY:  Past Surgical History:   Procedure Laterality Date    COLONOSCOPY/Golytely N/A 10/24/2018    Performed by [facility]O    LEVATOR DEHISCENCE Bilateral 2013       CURRENT MEDS:  Current Outpatient Medications   Medication Sig Dispense Refill    atorvastatin (LIPITOR) 40 MG tablet Take 1 tablet (40 mg total) by mouth once daily. 90 tablet 1    cholecalciferol, vitamin D3, (VITAMIN D3) 2,000 unit Cap Take 1 capsule by mouth once daily.      clobetasol (TEMOVATE) 0.05 % external solution Use on scalp one - two times daily as needed for scaling or itching 50 mL 1    doxycycline (VIBRAMYCIN) 100 MG Cap Take 1 capsule (100 mg total) by mouth once daily. 30 capsule 1    fluocinonide (LIDEX) 0.05 % external solution AAA scalp qhs 60 mL 2    frovatriptan (FROVA) 2.5 MG tablet Take 1 tablet (2.5 mg total) by mouth as needed for Migraine. If recurs, may repeat once after 2 hours. Don't exceed 3 per week. 36 tablet 1    ketoconazole (NIZORAL) 2 % shampoo Wash hair with medicated shampoo at least 2x/week - let sit on scalp at least 5 minutes prior to rinsing 120 mL 5    propranolol (INDERAL) 20 MG tablet Take 1 tablet (20 mg total) by mouth 2 (two) times daily. 180 tablet 0    triamcinolone acetonide 0.1% (KENALOG) 0.1 % cream Apply topically 2 (two) times daily. For up to 10 days to improve redness 15 g 0    venlafaxine (EFFEXOR-XR) 150 MG Cp24 Take 1 capsule (150 mg total) by mouth once daily. Along with 75 mg tablet.  Total daily dose to 225 mg 90 capsule 1    venlafaxine (EFFEXOR-XR) 75 MG 24 hr capsule Take 1 capsule (75 mg total) by mouth once daily. Along with 150 mg tablet.  Total daily dose to 225 mg 90 capsule 1    zonisamide (ZONEGRAN) 100 MG Cap Take 1 capsule (100 mg total) by mouth once daily. 90 capsule 3     No current facility-administered medications for this visit.        ALLERGIES:  Review of  "patient's allergies indicates:   Allergen Reactions    Aspirin Other (See Comments)     Causes bruising         FAMILY HISTORY:  Family History   Problem Relation Age of Onset    Macular degeneration Mother     Blindness Mother     Diabetes Mother     Hypertension Mother     Glaucoma Father     Blindness Father     Cancer Father     Amblyopia Neg Hx     Cataracts Neg Hx     Retinal detachment Neg Hx     Strabismus Neg Hx     Stroke Neg Hx     Thyroid disease Neg Hx     Melanoma Neg Hx        SOCIAL HISTORY:  Social History     Tobacco Use    Smoking status: Never Smoker    Smokeless tobacco: Never Used   Substance Use Topics    Alcohol use: No     Frequency: Never    Drug use: No       Review of Systems:  12 system review of systems is negative except for the symptoms mentioned in HPI.      Objective:     Vitals:    06/19/19 1240   BP: 123/69   Pulse: 91   Weight: 63.6 kg (140 lb 3.4 oz)   Height: 5' 4" (1.626 m)     General: NAD, well nourished   Eyes: no tearing, discharge, no erythema   ENT: moist mucous membranes of the oral cavity, nares patent    Neck: Supple, full range of motion  Cardiovascular: Warm and well perfused, pulses equal and symmetrical  Lungs: Normal work of breathing, normal chest wall excursions  Skin: No rash, lesions, or breakdown on exposed skin  Psychiatry: Mood and affect are appropriate   Abdomen: soft, non tender, non distended  Extremeties: No cyanosis, clubbing or edema.    Neurological   MENTAL STATUS: Alert and oriented to person, place, and time. Attention and concentration within normal limits. Speech without dysarthria  CRANIAL NERVES: No papilledema. Visual fields intact. PERRL. EOMI. Facial sensation intact. Face symmetrical. Hearing grossly intact. Full shoulder shrug bilaterally. Tongue protrudes midline   SENSORY: Sensation is intact to light touch throughout.    MOTOR: Normal bulk and tone. 5/5 deltoid, biceps, triceps, interosseous, hand  bilaterally. " 5/5 iliopsoas, knee extension/flexion, foot dorsi/plantarflexion bilaterally.    REFLEXES: Symmetric and 2+ throughout.   CEREBELLAR/COORDINATION/GAIT: Gait steady with normal arm swing and stride length.    Normal rapid alternating movements. Finger to nose intact.

## 2019-07-01 DIAGNOSIS — G89.29 CHRONIC NONINTRACTABLE HEADACHE, UNSPECIFIED HEADACHE TYPE: ICD-10-CM

## 2019-07-01 DIAGNOSIS — G43.909 MIGRAINE SYNDROME: ICD-10-CM

## 2019-07-01 DIAGNOSIS — R51.9 CHRONIC NONINTRACTABLE HEADACHE, UNSPECIFIED HEADACHE TYPE: ICD-10-CM

## 2019-07-01 RX ORDER — FROVATRIPTAN SUCCINATE 2.5 MG/1
2.5 TABLET, FILM COATED ORAL
Qty: 36 TABLET | Refills: 1 | Status: SHIPPED | OUTPATIENT
Start: 2019-07-01 | End: 2021-03-15 | Stop reason: SDUPTHER

## 2019-07-01 RX ORDER — PROPRANOLOL HYDROCHLORIDE 20 MG/1
20 TABLET ORAL 2 TIMES DAILY
Qty: 180 TABLET | Refills: 0 | Status: SHIPPED | OUTPATIENT
Start: 2019-07-01 | End: 2019-10-01 | Stop reason: SDUPTHER

## 2019-07-01 NOTE — TELEPHONE ENCOUNTER
----- Message from Angela Rush sent at 7/1/2019 11:36 AM CDT -----  Contact: Self [phone]  Patient is calling to get refills on her medication sent to [facility] - [address] [phone] (Phone)  [phone] (Fax)      1. propranolol (INDERAL) 20 MG tablet 180 tablet     2. frovatriptan (FROVA) 2.5 MG tablet 90 tablet

## 2019-07-02 ENCOUNTER — TELEPHONE (OUTPATIENT)
Dept: PAIN MEDICINE | Facility: CLINIC | Age: 69
End: 2019-07-02

## 2019-07-02 NOTE — TELEPHONE ENCOUNTER
----- Message from Rhonda Shen sent at 7/2/2019  8:06 AM CDT -----  Contact: Self [phone]  Patient would like to speak with you about zonisamide (ZONEGRAN) 100 MG Cap giving her headaches instead of helping with her migraines. Please advise

## 2019-07-02 NOTE — TELEPHONE ENCOUNTER
The patient states she gets a headache when she takes the medication zonisamide (ZONEGRAN) 100 MG Cap  The patient skipped Saturday and didn't get one. When she took it again Sunday the headache came back.

## 2019-07-10 ENCOUNTER — TELEPHONE (OUTPATIENT)
Dept: NEUROLOGY | Facility: CLINIC | Age: 69
End: 2019-07-10

## 2019-07-10 NOTE — TELEPHONE ENCOUNTER
----- Message from Merary Mora sent at 7/10/2019  8:22 AM CDT -----  Contact: [phone]/self  Patient is calling about her prescription refill. [facility] has not received the PA they needed to approve the medication. Please call [facility] at [phone]  Please call patient to confirm it has been sent. Thanks

## 2019-07-11 ENCOUNTER — TELEPHONE (OUTPATIENT)
Dept: NEUROLOGY | Facility: CLINIC | Age: 69
End: 2019-07-11

## 2019-08-01 RX ORDER — VENLAFAXINE HYDROCHLORIDE 75 MG/1
CAPSULE, EXTENDED RELEASE ORAL
Qty: 90 CAPSULE | Refills: 1 | Status: SHIPPED | OUTPATIENT
Start: 2019-08-01 | End: 2019-08-05 | Stop reason: SDUPTHER

## 2019-08-01 RX ORDER — VENLAFAXINE HYDROCHLORIDE 150 MG/1
CAPSULE, EXTENDED RELEASE ORAL
Qty: 90 CAPSULE | Refills: 1 | Status: SHIPPED | OUTPATIENT
Start: 2019-08-01 | End: 2020-03-02 | Stop reason: SDUPTHER

## 2019-08-05 RX ORDER — VENLAFAXINE HYDROCHLORIDE 75 MG/1
CAPSULE, EXTENDED RELEASE ORAL
Qty: 90 CAPSULE | Refills: 1 | Status: SHIPPED | OUTPATIENT
Start: 2019-08-05 | End: 2020-03-02 | Stop reason: SDUPTHER

## 2019-08-20 ENCOUNTER — TELEPHONE (OUTPATIENT)
Dept: NEUROLOGY | Facility: CLINIC | Age: 69
End: 2019-08-20

## 2019-08-20 NOTE — TELEPHONE ENCOUNTER
The patient says she is having a reaction from the new packaged medication Frova, this is new packaging and description of medication as well.  She doesn't feel the same as before.  The patient wants to know what she should do . She is experiencing head pain and that has never happened before.

## 2019-08-20 NOTE — TELEPHONE ENCOUNTER
----- Message from Tiffanie Yi sent at 8/20/2019  1:24 PM CDT -----  Contact: [phone]  Patient requesting to speak with you regarding side effects from medication frovatriptan (FROVA) 2.5 MG tablet. Please advise.

## 2019-09-04 ENCOUNTER — OFFICE VISIT (OUTPATIENT)
Dept: DERMATOLOGY | Facility: CLINIC | Age: 69
End: 2019-09-04
Payer: MEDICARE

## 2019-09-04 DIAGNOSIS — L65.9 HAIR LOSS DISORDER: ICD-10-CM

## 2019-09-04 DIAGNOSIS — L21.9 SEBORRHEIC DERMATITIS OF SCALP: ICD-10-CM

## 2019-09-04 PROCEDURE — 99999 PR PBB SHADOW E&M-EST. PATIENT-LVL II: CPT | Mod: PBBFAC,,, | Performed by: NURSE PRACTITIONER

## 2019-09-04 PROCEDURE — 99212 OFFICE O/P EST SF 10 MIN: CPT | Mod: PBBFAC | Performed by: NURSE PRACTITIONER

## 2019-09-04 PROCEDURE — 99213 OFFICE O/P EST LOW 20 MIN: CPT | Mod: S$PBB,,, | Performed by: NURSE PRACTITIONER

## 2019-09-04 PROCEDURE — 99213 PR OFFICE/OUTPT VISIT, EST, LEVL III, 20-29 MIN: ICD-10-PCS | Mod: S$PBB,,, | Performed by: NURSE PRACTITIONER

## 2019-09-04 PROCEDURE — 99999 PR PBB SHADOW E&M-EST. PATIENT-LVL II: ICD-10-PCS | Mod: PBBFAC,,, | Performed by: NURSE PRACTITIONER

## 2019-09-04 RX ORDER — KETOCONAZOLE 20 MG/ML
SHAMPOO, SUSPENSION TOPICAL
Qty: 120 ML | Refills: 5 | Status: SHIPPED | OUTPATIENT
Start: 2019-09-04 | End: 2020-05-27

## 2019-09-04 RX ORDER — FLUOCINONIDE TOPICAL SOLUTION USP, 0.05% 0.5 MG/ML
SOLUTION TOPICAL
Qty: 60 ML | Refills: 2 | Status: SHIPPED | OUTPATIENT
Start: 2019-09-04 | End: 2020-05-27

## 2019-09-04 NOTE — PROGRESS NOTES
Subjective:       Patient ID:  Maddy Lopez is a 68 y.o. female who presents for   Chief Complaint   Patient presents with    Hair Loss     follow-up      Hair Loss  - Follow-up  Symptom course: improving ( last seen 5/24/19)  Currently using: didnt use rogaine. completed course of lidex solution. stopped w/ melinda. completed po course of doxy. last perm/relaxer was in 1989.  Affected locations: face  Signs / symptoms: asymptomatic        Review of Systems   Genitourinary: Negative for irregular periods (postmenopausal).   Skin: Negative for daily sunscreen use and activity-related sunscreen use.        Objective:    Physical Exam   Constitutional: She appears well-developed and well-nourished. No distress.   Neurological: She is alert and oriented to person, place, and time. She is not disoriented.   Psychiatric: She has a normal mood and affect.   Skin:   Areas Examined (abnormalities noted in diagram):   Scalp / Hair Palpated and Inspected  Head / Face Inspection Performed  Neck Inspection Performed  Nails and Digits Inspection Performed              Diagram Legend     Erythematous scaling macule/papule c/w actinic keratosis       Vascular papule c/w angioma      Pigmented verrucoid papule/plaque c/w seborrheic keratosis      Yellow umbilicated papule c/w sebaceous hyperplasia      Irregularly shaped tan macule c/w lentigo     1-2 mm smooth white papules consistent with Milia      Movable subcutaneous cyst with punctum c/w epidermal inclusion cyst      Subcutaneous movable cyst c/w pilar cyst      Firm pink to brown papule c/w dermatofibroma      Pedunculated fleshy papule(s) c/w skin tag(s)      Evenly pigmented macule c/w junctional nevus     Mildly variegated pigmented, slightly irregular-bordered macule c/w mildly atypical nevus      Flesh colored to evenly pigmented papule c/w intradermal nevus       Pink pearly papule/plaque c/w basal cell carcinoma      Erythematous hyperkeratotic cursted plaque  c/w SCC      Surgical scar with no sign of skin cancer recurrence      Open and closed comedones      Inflammatory papules and pustules      Verrucoid papule consistent consistent with wart     Erythematous eczematous patches and plaques     Dystrophic onycholytic nail with subungual debris c/w onychomycosis     Umbilicated papule    Erythematous-base heme-crusted tan verrucoid plaque consistent with inflamed seborrheic keratosis     Erythematous Silvery Scaling Plaque c/w Psoriasis     See annotation      Assessment / Plan:        Hair loss disorder  Resolved at this time  Continue Encourage natural (chemical and heat-free) hair styles and limited styling products. D/c flat iron use, hair relaxers, and adhesives to hold wigs or hair pieces to scalp.  Continue otc hair supplements    Seborrheic dermatitis of scalp  -     fluocinonide (LIDEX) 0.05 % external solution; AAA scalp qhs  Dispense: 60 mL; Refill: 2  -     ketoconazole (NIZORAL) 2 % shampoo; Wash hair with medicated shampoo at least 2x/week - let sit on scalp at least 5 minutes prior to rinsing  Dispense: 120 mL; Refill: 5             Follow up if symptoms worsen or fail to improve.

## 2019-10-01 DIAGNOSIS — G89.29 CHRONIC NONINTRACTABLE HEADACHE, UNSPECIFIED HEADACHE TYPE: ICD-10-CM

## 2019-10-01 DIAGNOSIS — R51.9 CHRONIC NONINTRACTABLE HEADACHE, UNSPECIFIED HEADACHE TYPE: ICD-10-CM

## 2019-10-01 RX ORDER — PROPRANOLOL HYDROCHLORIDE 20 MG/1
20 TABLET ORAL 2 TIMES DAILY
Qty: 180 TABLET | Refills: 3 | Status: SHIPPED | OUTPATIENT
Start: 2019-10-01 | End: 2020-03-03 | Stop reason: SDUPTHER

## 2019-10-01 NOTE — TELEPHONE ENCOUNTER
----- Message from Monica Muhammad sent at 10/1/2019 12:03 PM CDT -----  No. [phone]    Patient needs script for Propranolol 20mg, 2 daily, #180.   [facility]

## 2019-10-11 ENCOUNTER — IMMUNIZATION (OUTPATIENT)
Dept: PHARMACY | Facility: CLINIC | Age: 69
End: 2019-10-11
Payer: MEDICARE

## 2019-10-14 ENCOUNTER — HOSPITAL ENCOUNTER (OUTPATIENT)
Dept: RADIOLOGY | Facility: HOSPITAL | Age: 69
Discharge: HOME | End: 2019-10-14
Attending: INTERNAL MEDICINE
Payer: MEDICARE

## 2019-10-14 DIAGNOSIS — Z12.31 BREAST CANCER SCREENING BY MAMMOGRAM: ICD-10-CM

## 2019-10-14 DIAGNOSIS — Z00.00 ANNUAL PHYSICAL EXAM: ICD-10-CM

## 2019-10-14 PROCEDURE — 77067 SCR MAMMO BI INCL CAD: CPT | Mod: 26,,, | Performed by: RADIOLOGY

## 2019-10-14 PROCEDURE — 77067 MAMMO DIGITAL SCREENING BILAT WITH CAD: ICD-10-PCS | Mod: 26,,, | Performed by: RADIOLOGY

## 2019-10-14 PROCEDURE — 77067 SCR MAMMO BI INCL CAD: CPT | Mod: TC

## 2020-02-10 PROBLEM — M51.379 DEGENERATION OF LUMBAR/LUMBOSACRAL DISC WITHOUT MYELOPATHY: Status: RESOLVED | Noted: 2019-01-07 | Resolved: 2020-02-10

## 2020-02-10 PROBLEM — M51.37 DEGENERATION OF LUMBAR/LUMBOSACRAL DISC WITHOUT MYELOPATHY: Status: RESOLVED | Noted: 2019-01-07 | Resolved: 2020-02-10

## 2020-02-10 PROBLEM — M54.50 CHRONIC BILATERAL LOW BACK PAIN WITHOUT SCIATICA: Status: RESOLVED | Noted: 2019-04-11 | Resolved: 2020-02-10

## 2020-02-10 PROBLEM — G89.29 CHRONIC BILATERAL LOW BACK PAIN WITHOUT SCIATICA: Status: RESOLVED | Noted: 2019-04-11 | Resolved: 2020-02-10

## 2020-02-27 RX ORDER — VENLAFAXINE HYDROCHLORIDE 150 MG/1
CAPSULE, EXTENDED RELEASE ORAL
Qty: 90 CAPSULE | Refills: 1 | OUTPATIENT
Start: 2020-02-27

## 2020-02-27 RX ORDER — VENLAFAXINE HYDROCHLORIDE 75 MG/1
CAPSULE, EXTENDED RELEASE ORAL
Qty: 90 CAPSULE | Refills: 1 | OUTPATIENT
Start: 2020-02-27

## 2020-02-27 NOTE — TELEPHONE ENCOUNTER
----- Message from Franky Grullon sent at 2/27/2020 12:12 PM CST -----  Contact: [phone] / self   Patient would like a refill for the medication venlafaxine (EFFEXOR-XR) 75 MG 24 hr capsule and venlafaxine (EFFEXOR-XR) 150 MG Cp24. Pharmacy is [facility]. Please Advise.

## 2020-03-02 DIAGNOSIS — E78.5 HYPERLIPIDEMIA, UNSPECIFIED HYPERLIPIDEMIA TYPE: ICD-10-CM

## 2020-03-02 RX ORDER — VENLAFAXINE HYDROCHLORIDE 75 MG/1
CAPSULE, EXTENDED RELEASE ORAL
Qty: 90 CAPSULE | Refills: 0 | Status: SHIPPED | OUTPATIENT
Start: 2020-03-02 | End: 2020-03-03 | Stop reason: SDUPTHER

## 2020-03-02 RX ORDER — VENLAFAXINE HYDROCHLORIDE 150 MG/1
CAPSULE, EXTENDED RELEASE ORAL
Qty: 90 CAPSULE | Refills: 0 | Status: SHIPPED | OUTPATIENT
Start: 2020-03-02 | End: 2020-03-03 | Stop reason: SDUPTHER

## 2020-03-02 RX ORDER — ZONISAMIDE 100 MG/1
100 CAPSULE ORAL DAILY
Qty: 90 CAPSULE | Refills: 3 | Status: SHIPPED | OUTPATIENT
Start: 2020-03-02 | End: 2020-03-09

## 2020-03-02 RX ORDER — ATORVASTATIN CALCIUM 40 MG/1
40 TABLET, FILM COATED ORAL DAILY
Qty: 90 TABLET | Refills: 0 | Status: SHIPPED | OUTPATIENT
Start: 2020-03-02 | End: 2020-03-03 | Stop reason: SDUPTHER

## 2020-03-02 NOTE — TELEPHONE ENCOUNTER
----- Message from Belia Aponte sent at 3/2/2020  8:11 AM CST -----  Contact: patient  Type:  RX Refill Request    Who Called: Maddy  Refill or New Rx: Refill  RX Name and Strength: zonisamide (ZONEGRAN) 100 MG Cap  How is the patient currently taking it? (ex. 1XDay): 2 X day  Is this a 30 day or 90 day RX: 90 day  Preferred Pharmacy with phone number: [facility] - [address] [phone] (Phone)   Local or Mail Order:local  Ordering Provider: Dr. Lebron  Would the patient rather a call back or a response via [facility]?  Call back  Best Call Back Number: [phone]  Additional Information: no

## 2020-03-02 NOTE — TELEPHONE ENCOUNTER
----- Message from Luda Gillis sent at 3/2/2020  8:23 AM CST -----  Contact: pt  Pt called in to request rx refills for :  atorvastatin (LIPITOR) 40 MG tablet  venlafaxine (EFFEXOR-XR) 150 MG Cp24  venlafaxine (EFFEXOR-XR) 75 MG 24 hr capsule     [facility] #[phone]    Pt can be reached at [phone].    Thank you.

## 2020-03-03 ENCOUNTER — PATIENT MESSAGE (OUTPATIENT)
Dept: NEUROLOGY | Facility: CLINIC | Age: 70
End: 2020-03-03

## 2020-03-03 ENCOUNTER — PATIENT MESSAGE (OUTPATIENT)
Dept: INTERNAL MEDICINE | Facility: CLINIC | Age: 70
End: 2020-03-03

## 2020-03-03 DIAGNOSIS — E78.5 HYPERLIPIDEMIA, UNSPECIFIED HYPERLIPIDEMIA TYPE: ICD-10-CM

## 2020-03-03 DIAGNOSIS — R51.9 CHRONIC NONINTRACTABLE HEADACHE, UNSPECIFIED HEADACHE TYPE: ICD-10-CM

## 2020-03-03 DIAGNOSIS — G89.29 CHRONIC NONINTRACTABLE HEADACHE, UNSPECIFIED HEADACHE TYPE: ICD-10-CM

## 2020-03-03 RX ORDER — PROPRANOLOL HYDROCHLORIDE 20 MG/1
20 TABLET ORAL 2 TIMES DAILY
Qty: 180 TABLET | Refills: 0 | Status: SHIPPED | OUTPATIENT
Start: 2020-03-03 | End: 2020-05-27 | Stop reason: SDUPTHER

## 2020-03-03 RX ORDER — VENLAFAXINE HYDROCHLORIDE 75 MG/1
CAPSULE, EXTENDED RELEASE ORAL
Qty: 90 CAPSULE | Refills: 0 | Status: SHIPPED | OUTPATIENT
Start: 2020-03-03 | End: 2020-05-27 | Stop reason: SDUPTHER

## 2020-03-03 RX ORDER — VENLAFAXINE HYDROCHLORIDE 150 MG/1
CAPSULE, EXTENDED RELEASE ORAL
Qty: 90 CAPSULE | Refills: 0 | Status: SHIPPED | OUTPATIENT
Start: 2020-03-03 | End: 2020-05-27 | Stop reason: SDUPTHER

## 2020-03-03 RX ORDER — ATORVASTATIN CALCIUM 40 MG/1
40 TABLET, FILM COATED ORAL DAILY
Qty: 90 TABLET | Refills: 0 | Status: SHIPPED | OUTPATIENT
Start: 2020-03-03 | End: 2020-05-27 | Stop reason: SDUPTHER

## 2020-03-09 ENCOUNTER — TELEPHONE (OUTPATIENT)
Dept: NEUROLOGY | Facility: CLINIC | Age: 70
End: 2020-03-09

## 2020-03-09 NOTE — TELEPHONE ENCOUNTER
----- Message from Sheyla Muñoz sent at 3/9/2020  8:50 AM CDT -----  Contact: [phone]/self  Patient requesting to speak with you regarding receiving the wrong prescription of zonisamide (ZONEGRAN) 100 MG Cap. Please advise.

## 2020-03-09 NOTE — TELEPHONE ENCOUNTER
The patient states she does not take the zonisamide (ZONEGRAN) 100 MG Cap. That was refilled by mistake. What should she do with the rx?

## 2020-04-24 ENCOUNTER — TELEPHONE (OUTPATIENT)
Dept: OPTOMETRY | Facility: CLINIC | Age: 70
End: 2020-04-24

## 2020-04-27 ENCOUNTER — PATIENT MESSAGE (OUTPATIENT)
Dept: OPTOMETRY | Facility: CLINIC | Age: 70
End: 2020-04-27

## 2020-05-04 ENCOUNTER — PATIENT MESSAGE (OUTPATIENT)
Dept: OPTOMETRY | Facility: CLINIC | Age: 70
End: 2020-05-04

## 2020-05-05 ENCOUNTER — TELEPHONE (OUTPATIENT)
Dept: OPTOMETRY | Facility: CLINIC | Age: 70
End: 2020-05-05

## 2020-05-20 ENCOUNTER — PATIENT MESSAGE (OUTPATIENT)
Dept: INTERNAL MEDICINE | Facility: CLINIC | Age: 70
End: 2020-05-20

## 2020-05-21 ENCOUNTER — PATIENT MESSAGE (OUTPATIENT)
Dept: INTERNAL MEDICINE | Facility: CLINIC | Age: 70
End: 2020-05-21

## 2020-05-21 ENCOUNTER — TELEPHONE (OUTPATIENT)
Dept: INTERNAL MEDICINE | Facility: CLINIC | Age: 70
End: 2020-05-21

## 2020-05-21 NOTE — TELEPHONE ENCOUNTER
----- Message from Farzaneh Marrero sent at 5/21/2020 12:22 PM CDT -----  Contact: Patient  Patient would like to get an appointment to come in next week    Please call [phone]

## 2020-05-22 ENCOUNTER — LAB VISIT (OUTPATIENT)
Dept: LAB | Facility: HOSPITAL | Age: 70
End: 2020-05-22
Attending: INTERNAL MEDICINE
Payer: MEDICARE

## 2020-05-22 DIAGNOSIS — E55.9 VITAMIN D INSUFFICIENCY: ICD-10-CM

## 2020-05-22 DIAGNOSIS — Z00.00 ANNUAL PHYSICAL EXAM: ICD-10-CM

## 2020-05-22 DIAGNOSIS — R73.9 HYPERGLYCEMIA: ICD-10-CM

## 2020-05-22 DIAGNOSIS — E78.00 PURE HYPERCHOLESTEROLEMIA: ICD-10-CM

## 2020-05-22 LAB
25(OH)D3+25(OH)D2 SERPL-MCNC: 47 NG/ML (ref 30–96)
ALBUMIN SERPL BCP-MCNC: 3.5 G/DL (ref 3.5–5.2)
ALP SERPL-CCNC: 136 U/L (ref 55–135)
ALT SERPL W/O P-5'-P-CCNC: 34 U/L (ref 10–44)
ANION GAP SERPL CALC-SCNC: 7 MMOL/L (ref 8–16)
AST SERPL-CCNC: 19 U/L (ref 10–40)
BILIRUB SERPL-MCNC: 0.6 MG/DL (ref 0.1–1)
BUN SERPL-MCNC: 9 MG/DL (ref 8–23)
CALCIUM SERPL-MCNC: 8.8 MG/DL (ref 8.7–10.5)
CHLORIDE SERPL-SCNC: 109 MMOL/L (ref 95–110)
CHOLEST SERPL-MCNC: 188 MG/DL (ref 120–199)
CHOLEST/HDLC SERPL: 5.1 {RATIO} (ref 2–5)
CO2 SERPL-SCNC: 27 MMOL/L (ref 23–29)
CREAT SERPL-MCNC: 0.9 MG/DL (ref 0.5–1.4)
EST. GFR  (AFRICAN AMERICAN): >60 ML/MIN/1.73 M^2
EST. GFR  (NON AFRICAN AMERICAN): >60 ML/MIN/1.73 M^2
ESTIMATED AVG GLUCOSE: 134 MG/DL (ref 68–131)
GLUCOSE SERPL-MCNC: 116 MG/DL (ref 70–110)
HBA1C MFR BLD HPLC: 6.3 % (ref 4–5.6)
HDLC SERPL-MCNC: 37 MG/DL (ref 40–75)
HDLC SERPL: 19.7 % (ref 20–50)
LDLC SERPL CALC-MCNC: 115.6 MG/DL (ref 63–159)
NONHDLC SERPL-MCNC: 151 MG/DL
POTASSIUM SERPL-SCNC: 3.9 MMOL/L (ref 3.5–5.1)
PROT SERPL-MCNC: 6.4 G/DL (ref 6–8.4)
SODIUM SERPL-SCNC: 143 MMOL/L (ref 136–145)
TRIGL SERPL-MCNC: 177 MG/DL (ref 30–150)

## 2020-05-22 PROCEDURE — 80061 LIPID PANEL: CPT

## 2020-05-22 PROCEDURE — 80053 COMPREHEN METABOLIC PANEL: CPT

## 2020-05-22 PROCEDURE — 83036 HEMOGLOBIN GLYCOSYLATED A1C: CPT

## 2020-05-22 PROCEDURE — 36415 COLL VENOUS BLD VENIPUNCTURE: CPT | Mod: PO

## 2020-05-22 PROCEDURE — 82306 VITAMIN D 25 HYDROXY: CPT

## 2020-05-27 ENCOUNTER — PATIENT MESSAGE (OUTPATIENT)
Dept: INTERNAL MEDICINE | Facility: CLINIC | Age: 70
End: 2020-05-27

## 2020-05-27 ENCOUNTER — OFFICE VISIT (OUTPATIENT)
Dept: INTERNAL MEDICINE | Facility: CLINIC | Age: 70
End: 2020-05-27
Payer: MEDICARE

## 2020-05-27 VITALS
OXYGEN SATURATION: 99 % | DIASTOLIC BLOOD PRESSURE: 68 MMHG | BODY MASS INDEX: 24.72 KG/M2 | HEART RATE: 74 BPM | TEMPERATURE: 98 F | HEIGHT: 64 IN | SYSTOLIC BLOOD PRESSURE: 124 MMHG | WEIGHT: 144.81 LBS

## 2020-05-27 DIAGNOSIS — E78.5 HYPERLIPIDEMIA, UNSPECIFIED HYPERLIPIDEMIA TYPE: ICD-10-CM

## 2020-05-27 DIAGNOSIS — G89.29 CHRONIC NONINTRACTABLE HEADACHE, UNSPECIFIED HEADACHE TYPE: ICD-10-CM

## 2020-05-27 DIAGNOSIS — R51.9 CHRONIC NONINTRACTABLE HEADACHE, UNSPECIFIED HEADACHE TYPE: ICD-10-CM

## 2020-05-27 DIAGNOSIS — I10 ESSENTIAL HYPERTENSION: Primary | ICD-10-CM

## 2020-05-27 PROCEDURE — 99214 PR OFFICE/OUTPT VISIT, EST, LEVL IV, 30-39 MIN: ICD-10-PCS | Mod: S$PBB,,, | Performed by: INTERNAL MEDICINE

## 2020-05-27 PROCEDURE — 99213 OFFICE O/P EST LOW 20 MIN: CPT | Mod: PBBFAC,PO | Performed by: INTERNAL MEDICINE

## 2020-05-27 PROCEDURE — 99999 PR PBB SHADOW E&M-EST. PATIENT-LVL III: CPT | Mod: PBBFAC,,, | Performed by: INTERNAL MEDICINE

## 2020-05-27 PROCEDURE — 99999 PR PBB SHADOW E&M-EST. PATIENT-LVL III: ICD-10-PCS | Mod: PBBFAC,,, | Performed by: INTERNAL MEDICINE

## 2020-05-27 PROCEDURE — 99214 OFFICE O/P EST MOD 30 MIN: CPT | Mod: S$PBB,,, | Performed by: INTERNAL MEDICINE

## 2020-05-27 RX ORDER — VENLAFAXINE HYDROCHLORIDE 150 MG/1
CAPSULE, EXTENDED RELEASE ORAL
Qty: 90 CAPSULE | Refills: 0 | Status: SHIPPED | OUTPATIENT
Start: 2020-05-27 | End: 2020-08-13 | Stop reason: SDUPTHER

## 2020-05-27 RX ORDER — AMLODIPINE BESYLATE 2.5 MG/1
2.5 TABLET ORAL DAILY
Qty: 90 TABLET | Refills: 0 | Status: SHIPPED | OUTPATIENT
Start: 2020-05-27 | End: 2020-08-13 | Stop reason: SDUPTHER

## 2020-05-27 RX ORDER — VENLAFAXINE HYDROCHLORIDE 75 MG/1
CAPSULE, EXTENDED RELEASE ORAL
Qty: 90 CAPSULE | Refills: 0 | Status: SHIPPED | OUTPATIENT
Start: 2020-05-27 | End: 2020-08-13 | Stop reason: SDUPTHER

## 2020-05-27 RX ORDER — ATORVASTATIN CALCIUM 40 MG/1
40 TABLET, FILM COATED ORAL DAILY
Qty: 90 TABLET | Refills: 0 | Status: SHIPPED | OUTPATIENT
Start: 2020-05-27 | End: 2020-08-13 | Stop reason: SDUPTHER

## 2020-05-27 RX ORDER — PROPRANOLOL HYDROCHLORIDE 20 MG/1
20 TABLET ORAL 2 TIMES DAILY
Qty: 180 TABLET | Refills: 0 | Status: SHIPPED | OUTPATIENT
Start: 2020-05-27 | End: 2020-08-13 | Stop reason: SDUPTHER

## 2020-05-27 NOTE — PATIENT INSTRUCTIONS
We recommend you start the blood pressure medication amlodipine.  Taking it once daily will help to bring down blood pressure.  The goal is for blood pressure on average for the top number to be less than 135 in the bottom number to be less than 85.      Low-Salt Choices  Eating salt (sodium) can make your body retain too much water. Excess water makes your heart work harder. Canned, packaged, and frozen foods are easy to prepare, but they are often high in sodium. Here are some ideas for low-salt foods you can easily prepare yourself.    For breakfast  · Fruit or 100% fruit juice  · Whole-wheat bread or an English muffin. Compare sodium content on labels.  · Low-fat milk or yogurt  · Unsalted eggs  · Shredded wheat  · Corn tortillas  · Unsalted steamed rice  · Regular (not instant) hot cereal, made without salt  Stay away from:  · Sausage, early, and ham  · Flour tortillas  · Packaged muffins, pancakes, and biscuits  · Instant hot cereals  · Cottage cheese  For lunch and dinner  · Fresh fish, chicken, turkey, or meat--baked, broiled, or roasted without salt  · Dry beans, cooked without salt  · Tofu, stir-fried without salt  · Unsalted fresh fruit and vegetables, or frozen or canned fruit and vegetables with no added salt  Stay away from:  · Lunch or deli meat that is cured or smoked  · Cheese  · Tomato juice and catsup  · Canned vegetables, soups, and fish not labeled as no-salt-added or reduced sodium  · Packaged gravies and sauces  · Olives, pickles, and relish  · Bottled salad dressings  For snacks and desserts  · Yogurt  · Unsalted, air popped popcorn  · Unsalted nuts or seeds  Stay away from:  · Pies and cakes  · Packaged dessert mixes  · Pizza  · Canned and packaged puddings  · Pretzels, chips, crackers, and nuts--unless the label says unsalted  Date Last Reviewed: 6/17/2015  © 6980-8988 [employer]. [address] [place] [address], [place] [address]. All rights reserved. This information is not intended  as a substitute for professional medical care. Always follow your healthcare professional's instructions.

## 2020-05-27 NOTE — PROGRESS NOTES
Portions of this note are generated with voice recognition software. Typographical errors may exist.     SUBJECTIVE:    This is a/an 69 y.o. female here for primary care visit for  Chief Complaint   Patient presents with    Knee Pain     Patient provides blood pressure readings over a 2 week time.  Showing systolic blood pressure going into the 140s and sometimes 150s.  Lows in the 120s.  Patient states that prior to that she had not been monitoring blood pressure very closely.  Patient has been taking propranolol chronically to help but has headache prophylaxis but not for blood pressure management.    Salt dietary indiscretions episodic.  Patient denies alcohol use as contributing to blood pressure problems.  Denies significant problems with psychosocial stress or anxiety.    Sedentary lifestyle has resulted in gradual weight gain.  Patient understands that she is known the prediabetic range.  She wants to pursue modest weight loss to help with this in control.      Medications Reviewed and Updated    Past medical, family, and social histories were reviewed and updated.    Review of Systems negative unless otherwise noted in history of present illness-  ROS    General ROS: negative  Psychological ROS: negative  ENT ROS: negative  Endocrine ROS: Negative  Allergy and Immunology ROS: negative  Cardiovascular ROS: negative  Pulmonary ROS: Negative  Gastrointestinal ROS: negative  Genito-Urinary ROS: negative  Musculoskeletal ROS: negative  Neurological ROS: negative  Dermatological ROS: negative        Allergic:    Review of patient's allergies indicates:   Allergen Reactions    Aspirin Other (See Comments)     Causes bruising         OBJECTIVE:  BP: 124/68 Pulse: 74 Temp: 98.3 °F (36.8 °C)  Wt Readings from Last 3 Encounters:   05/27/20 65.7 kg (144 lb 13.5 oz)   06/19/19 63.6 kg (140 lb 3.4 oz)   05/22/19 61.7 kg (136 lb 0.4 oz)    Body mass index is 24.86 kg/m².  Previous Blood Pressure Readings :   BP Readings  from Last 3 Encounters:   05/27/20 124/68   06/19/19 123/69   05/22/19 120/74       Physical Exam    GEN: No apparent distress  HEENT: sclera non-icteric, conjunctiva clear  CV: no peripheral edema regular rate and rhythm.  No murmurs.  PULM: breathing non-labored  ABD: non, protuberant abdomen.  PSYCH: appropriate affect  MSK: able to rise from chair without assistance  SKIN: normal skin turgor    Pertinent Labs Reviewed       ASSESSMENT/PLAN:    Essential hypertension.Condition not optimally controlled. Detailed counseling on self care measures. Plan to monitor clinically in addition to plan below or as listed on After Visit Summary.   -     amLODIPine (NORVASC) 2.5 MG tablet; Take 1 tablet (2.5 mg total) by mouth once daily.  Dispense: 90 tablet; Refill: 0    Chronic nonintractable headache, unspecified headache type.Condition stable.  Counseling given today on self-care measures. Plan to monitor clinically. Continue current medical plan.   -     venlafaxine (EFFEXOR-XR) 75 MG 24 hr capsule; TAKE 1 CAPSULE DAILY (ALONG WITH 150 MG CAPSULE FOR TOTAL DAILY DOSE  MG)  Dispense: 90 capsule; Refill: 0  -     venlafaxine (EFFEXOR-XR) 150 MG Cp24; TAKE 1 CAPSULE DAILY ALONG WITH 75 MG CAPSULE FOR TOTAL DAILY DOSE  MG  Dispense: 90 capsule; Refill: 0  -     propranoloL (INDERAL) 20 MG tablet; Take 1 tablet (20 mg total) by mouth 2 (two) times daily.  Dispense: 180 tablet; Refill: 0    Hyperlipidemia, unspecified hyperlipidemia type.Condition stable.  Counseling given today on self-care measures. Plan to monitor clinically. Continue current medical plan.   -     atorvastatin (LIPITOR) 40 MG tablet; Take 1 tablet (40 mg total) by mouth once daily.  Dispense: 90 tablet; Refill: 0          Future Appointments   Date Time Provider Department Center   6/2/2020 11:00 AM Brock Donovan OD [facility]  5/27/2020  11:07 AM

## 2020-06-01 ENCOUNTER — TELEPHONE (OUTPATIENT)
Dept: INTERNAL MEDICINE | Facility: CLINIC | Age: 70
End: 2020-06-01

## 2020-06-01 ENCOUNTER — PATIENT OUTREACH (OUTPATIENT)
Dept: ADMINISTRATIVE | Facility: OTHER | Age: 70
End: 2020-06-01

## 2020-06-01 NOTE — TELEPHONE ENCOUNTER
----- Message from Tiffanie Yi sent at 6/1/2020  1:49 PM CDT -----  Contact: patient  Type:  Sooner Appointment Request     Caller is requesting a sooner appointment.  Caller declined first available appointment listed below.  Caller will not accept being placed on the waitlist and is requesting a message be sent to doctor.  Name of Caller: pt  When is the first available appointment? 6/29  Symptoms or Reason: sore throat  Would the patient rather a call back or a response via [facility]? Call back  Best Call Back Number: [phone]  Additional Information: PostedIn Pharmacy

## 2020-06-02 ENCOUNTER — OFFICE VISIT (OUTPATIENT)
Dept: OPTOMETRY | Facility: CLINIC | Age: 70
End: 2020-06-02
Payer: MEDICARE

## 2020-06-02 DIAGNOSIS — H43.812 POSTERIOR VITREOUS DETACHMENT OF LEFT EYE: Primary | ICD-10-CM

## 2020-06-02 PROCEDURE — 99999 PR PBB SHADOW E&M-EST. PATIENT-LVL II: CPT | Mod: PBBFAC,,, | Performed by: OPTOMETRIST

## 2020-06-02 PROCEDURE — 99999 PR PBB SHADOW E&M-EST. PATIENT-LVL II: ICD-10-PCS | Mod: PBBFAC,,, | Performed by: OPTOMETRIST

## 2020-06-02 PROCEDURE — 92014 COMPRE OPH EXAM EST PT 1/>: CPT | Mod: S$PBB,,, | Performed by: OPTOMETRIST

## 2020-06-02 PROCEDURE — 92014 PR EYE EXAM, EST PATIENT,COMPREHESV: ICD-10-PCS | Mod: S$PBB,,, | Performed by: OPTOMETRIST

## 2020-06-02 PROCEDURE — 99212 OFFICE O/P EST SF 10 MIN: CPT | Mod: PBBFAC,PO | Performed by: OPTOMETRIST

## 2020-06-16 PROBLEM — R73.03 PREDIABETES: Status: ACTIVE | Noted: 2020-06-16

## 2020-06-16 NOTE — PROGRESS NOTES
Subjective:       Patient ID: Maddy Lopez is a 69 y.o. female.    Chief Complaint:   No chief complaint on file.      HPI    #Last visit/Previous Provider  This patient is new to me  Previously seen by Juaquin Alves MD      Future Appointments   Date Time Provider Department Center   6/17/2020 11:20 AM Adelfo Castellanos III, MD [facility]   8/13/2020  8:00 AM Stephanie Boyce MD [facility]           #Interim Hx    No urgent care or ED/hospitalization    #Concerns Today     Throat Pain  Have relative patient reports that she has had throat pain symptoms for roughly 2 months.  She states that she forgot to tell her primary at her last visit about the symptoms.  His symptoms are located at the midline, she states that they last for a few seconds and have happened multiple times a week.  Then a increasing in intensity or frequency.  They are a 5/10 on the pain scale.  The usually self-resolving she does not do anything such as swallowing or drinking liquids for the symptoms to tano.  She does not have any associated symptoms of nausea, vomiting, for his voice, change in voice.  She does not have symptoms that awaken her from sleep.  Symptoms are not associated with food getting stuck in the throat, dysphagia, odynophagia.  Of note she reports that she had a surgical removal of a possible lymph node of the neck when she was living in [place].  She reports that her pathology did not demonstrate any abnormal findings.  She denies any symptoms of fevers, chills, night sweats, weight loss that is unexplained.         #Chronic Problems    HLD - atorvastatin    HTN  Complication: no CVA/CKD/CAD  Last labs :   - BMP  Lab Results   Component Value Date     05/22/2020    K 3.9 05/22/2020     05/22/2020    CO2 27 05/22/2020    BUN 9 05/22/2020    CREATININE 0.9 05/22/2020    CALCIUM 8.8 05/22/2020    ANIONGAP 7 (L) 05/22/2020    ESTGFRAFRICA >60.0 05/22/2020    EGFRNONAA >60.0 05/22/2020  "      Medication: adherent to   - amlodipine 2.5mg    Migraine   Frovatriptan   aimovig   Propranolol 20mg   Seen by neruology last appt 6/2019         MDD/MAEVE- Stable on Venlafaxine     Prediabetes  Lab Results   Component Value Date    HGBA1C 6.3 (H) 05/22/2020             Health Maintenance   Topic Date Due    TETANUS VACCINE  10/03/1970    Pneumococcal Vaccine (65+ Low/Medium Risk) (2 of 2 - PPSV23) 05/22/2020    DEXA SCAN  10/12/2021    Mammogram  10/14/2021    Lipid Panel  05/22/2025    Hepatitis C Screening  Completed             Review of Systems   All other systems reviewed and are negative.      Objective:   /78 (BP Location: Right arm, Patient Position: Sitting, BP Method: Medium (Manual))   Pulse 91   Temp 97.3 °F (36.3 °C)   Ht 5' 4" (1.626 m)   Wt 62 kg (136 lb 11 oz)   LMP  (LMP Unknown)   SpO2 100%   BMI 23.46 kg/m²            Physical Exam  Vitals signs and nursing note reviewed.   Constitutional:       General: She is not in acute distress.     Appearance: She is well-developed. She is not diaphoretic.   HENT:      Head: Normocephalic.      Nose: Nose normal.   Eyes:      General:         Right eye: No discharge.         Left eye: No discharge.      Conjunctiva/sclera: Conjunctivae normal.      Pupils: Pupils are equal, round, and reactive to light.   Neck:      Musculoskeletal: Normal range of motion and neck supple. No neck rigidity or muscular tenderness.      Vascular: No carotid bruit.   Cardiovascular:      Rate and Rhythm: Normal rate and regular rhythm.      Heart sounds: Normal heart sounds. No murmur. No friction rub. No gallop.    Pulmonary:      Effort: Pulmonary effort is normal. No respiratory distress.   Abdominal:      General: Bowel sounds are normal. There is no distension.      Palpations: Abdomen is soft.   Musculoskeletal: Normal range of motion.         General: No deformity.   Lymphadenopathy:      Cervical: No cervical adenopathy.   Skin:     General: Skin " is warm.   Neurological:      Mental Status: She is alert and oriented to person, place, and time.      Cranial Nerves: No cranial nerve deficit.             ASCVD  The 10-year ASCVD risk score (Jong HELM Jr., et al., 2013) is: 9.4%    Values used to calculate the score:      Age: 69 years      Sex: Female      Is Non- : Yes      Diabetic: No      Tobacco smoker: No      Systolic Blood Pressure: 124 mmHg      Is BP treated: Yes      HDL Cholesterol: 37 mg/dL      Total Cholesterol: 188 mg/dL    Basic Labs  BMP  Lab Results   Component Value Date     05/22/2020    K 3.9 05/22/2020     05/22/2020    CO2 27 05/22/2020    BUN 9 05/22/2020    CREATININE 0.9 05/22/2020    CALCIUM 8.8 05/22/2020    ANIONGAP 7 (L) 05/22/2020    ESTGFRAFRICA >60.0 05/22/2020    EGFRNONAA >60.0 05/22/2020     Lab Results   Component Value Date    ALT 34 05/22/2020    AST 19 05/22/2020    ALKPHOS 136 (H) 05/22/2020    BILITOT 0.6 05/22/2020             STD    Lab Results   Component Value Date    RPR Non-reactive 05/22/2019     Lab Results   Component Value Date    HEPCAB Negative 05/22/2019         Lipids  Lab Results   Component Value Date    CHOL 188 05/22/2020     Lab Results   Component Value Date    HDL 37 (L) 05/22/2020     Lab Results   Component Value Date    LDLCALC 115.6 05/22/2020     Lab Results   Component Value Date    TRIG 177 (H) 05/22/2020     Lab Results   Component Value Date    CHOLHDL 19.7 (L) 05/22/2020       DM  Lab Results   Component Value Date    HGBA1C 6.3 (H) 05/22/2020         Assessment:       1. Throat pain    2. Prediabetes          New: stable  New: Uncontrolled  New : worsening  New: Improved    Chronic stable  Chonic : uncontrolled  Chronic : worsening  Chronic : improved    Plan:             Maddy was seen today for sore throat.    Diagnoses and all orders for this visit:    Throat pain  Unclear  Etiology of throat pain  Given her hx of unclear resection of LN would  recommend ENT eval with Direct laryngoscopy  No red flag features currently on history or exam    reviewed signs and symptoms that should prompt return to provider or evaluation in the ED    -     Ambulatory referral/consult to ENT; Future    Prediabetes    Other orders  -     Cancel: Pneumococcal Polysaccharide Vaccine (23 Valent) (SQ/IM)          Future Appointments   Date Time Provider Department Center   6/17/2020 11:20 AM Adelfo Castellanos III, MD [facility]   8/13/2020  8:00 AM Stephanie Boyce MD [facility]           Medication List with Changes/Refills   Current Medications    AMLODIPINE (NORVASC) 2.5 MG TABLET    Take 1 tablet (2.5 mg total) by mouth once daily.    ATORVASTATIN (LIPITOR) 40 MG TABLET    Take 1 tablet (40 mg total) by mouth once daily.    CHOLECALCIFEROL, VITAMIN D3, (VITAMIN D3) 2,000 UNIT CAP    Take 1 capsule by mouth once daily.    ERENUMAB-AOOE (AIMOVIG AUTOINJECTOR) 140 MG/ML ATIN    Inject 140 mg into the skin every 28 days.    FROVATRIPTAN (FROVA) 2.5 MG TABLET    Take 1 tablet (2.5 mg total) by mouth as needed for Migraine. If recurs, may repeat once after 2 hours. Don't exceed 3 per week.    PROPRANOLOL (INDERAL) 20 MG TABLET    Take 1 tablet (20 mg total) by mouth 2 (two) times daily.    VENLAFAXINE (EFFEXOR-XR) 150 MG CP24    TAKE 1 CAPSULE DAILY ALONG WITH 75 MG CAPSULE FOR TOTAL DAILY DOSE  MG    VENLAFAXINE (EFFEXOR-XR) 75 MG 24 HR CAPSULE    TAKE 1 CAPSULE DAILY (ALONG WITH 150 MG CAPSULE FOR TOTAL DAILY DOSE  MG)     Disclaimer:  This note has been generated using voice-recognition software. There may be grammatical or spelling errors that have been missed during proof-reading

## 2020-06-17 ENCOUNTER — OFFICE VISIT (OUTPATIENT)
Dept: INTERNAL MEDICINE | Facility: CLINIC | Age: 70
End: 2020-06-17
Payer: MEDICARE

## 2020-06-17 VITALS
HEIGHT: 64 IN | HEART RATE: 91 BPM | SYSTOLIC BLOOD PRESSURE: 130 MMHG | TEMPERATURE: 97 F | OXYGEN SATURATION: 100 % | BODY MASS INDEX: 23.34 KG/M2 | WEIGHT: 136.69 LBS | DIASTOLIC BLOOD PRESSURE: 78 MMHG

## 2020-06-17 DIAGNOSIS — R73.03 PREDIABETES: ICD-10-CM

## 2020-06-17 DIAGNOSIS — R07.0 THROAT PAIN: Primary | ICD-10-CM

## 2020-06-17 PROCEDURE — 99999 PR PBB SHADOW E&M-EST. PATIENT-LVL V: CPT | Mod: PBBFAC,,, | Performed by: INTERNAL MEDICINE

## 2020-06-17 PROCEDURE — 99213 OFFICE O/P EST LOW 20 MIN: CPT | Mod: S$PBB,,, | Performed by: INTERNAL MEDICINE

## 2020-06-17 PROCEDURE — 99215 OFFICE O/P EST HI 40 MIN: CPT | Mod: PBBFAC,PO | Performed by: INTERNAL MEDICINE

## 2020-06-17 PROCEDURE — 99999 PR PBB SHADOW E&M-EST. PATIENT-LVL V: ICD-10-PCS | Mod: PBBFAC,,, | Performed by: INTERNAL MEDICINE

## 2020-06-17 PROCEDURE — 99213 PR OFFICE/OUTPT VISIT, EST, LEVL III, 20-29 MIN: ICD-10-PCS | Mod: S$PBB,,, | Performed by: INTERNAL MEDICINE

## 2020-06-17 NOTE — PATIENT INSTRUCTIONS
We were happy to see you today    For your Referrals    ENT      Please return to clinic in      At next scheudled visit

## 2020-07-09 ENCOUNTER — TELEPHONE (OUTPATIENT)
Dept: OTOLARYNGOLOGY | Facility: CLINIC | Age: 70
End: 2020-07-09

## 2020-07-09 DIAGNOSIS — Z01.812 PRE-PROCEDURE LAB EXAM: ICD-10-CM

## 2020-07-13 ENCOUNTER — LAB VISIT (OUTPATIENT)
Dept: FAMILY MEDICINE | Facility: CLINIC | Age: 70
End: 2020-07-13
Payer: MEDICARE

## 2020-07-13 DIAGNOSIS — Z01.812 PRE-PROCEDURE LAB EXAM: ICD-10-CM

## 2020-07-13 PROCEDURE — U0003 INFECTIOUS AGENT DETECTION BY NUCLEIC ACID (DNA OR RNA); SEVERE ACUTE RESPIRATORY SYNDROME CORONAVIRUS 2 (SARS-COV-2) (CORONAVIRUS DISEASE [COVID-19]), AMPLIFIED PROBE TECHNIQUE, MAKING USE OF HIGH THROUGHPUT TECHNOLOGIES AS DESCRIBED BY CMS-2020-01-R: HCPCS

## 2020-07-14 ENCOUNTER — PATIENT OUTREACH (OUTPATIENT)
Dept: ADMINISTRATIVE | Facility: OTHER | Age: 70
End: 2020-07-14

## 2020-07-14 LAB — SARS-COV-2 RNA RESP QL NAA+PROBE: NOT DETECTED

## 2020-07-14 NOTE — PROGRESS NOTES
Requested updates within Care Everywhere.  Patient's chart was reviewed for overdue WINDY topics.  Immunizations reconciled.    Orders placed:  Tasked appts:  Labs Linked:

## 2020-07-15 DIAGNOSIS — Z71.89 COMPLEX CARE COORDINATION: ICD-10-CM

## 2020-07-16 ENCOUNTER — OFFICE VISIT (OUTPATIENT)
Dept: OTOLARYNGOLOGY | Facility: CLINIC | Age: 70
End: 2020-07-16
Payer: MEDICARE

## 2020-07-16 VITALS
BODY MASS INDEX: 23.64 KG/M2 | HEIGHT: 64 IN | WEIGHT: 138.44 LBS | HEART RATE: 85 BPM | SYSTOLIC BLOOD PRESSURE: 136 MMHG | DIASTOLIC BLOOD PRESSURE: 81 MMHG

## 2020-07-16 DIAGNOSIS — R07.0 THROAT PAIN: ICD-10-CM

## 2020-07-16 PROCEDURE — 99214 OFFICE O/P EST MOD 30 MIN: CPT | Mod: PBBFAC,PN,25 | Performed by: OTOLARYNGOLOGY

## 2020-07-16 PROCEDURE — 99999 PR PBB SHADOW E&M-EST. PATIENT-LVL IV: CPT | Mod: PBBFAC,,, | Performed by: OTOLARYNGOLOGY

## 2020-07-16 PROCEDURE — 31575 PR LARYNGOSCOPY, FLEXIBLE; DIAGNOSTIC: ICD-10-PCS | Mod: S$PBB,,, | Performed by: OTOLARYNGOLOGY

## 2020-07-16 PROCEDURE — 99203 OFFICE O/P NEW LOW 30 MIN: CPT | Mod: 25,S$PBB,, | Performed by: OTOLARYNGOLOGY

## 2020-07-16 PROCEDURE — 31575 DIAGNOSTIC LARYNGOSCOPY: CPT | Mod: S$PBB,,, | Performed by: OTOLARYNGOLOGY

## 2020-07-16 PROCEDURE — 99203 PR OFFICE/OUTPT VISIT, NEW, LEVL III, 30-44 MIN: ICD-10-PCS | Mod: 25,S$PBB,, | Performed by: OTOLARYNGOLOGY

## 2020-07-16 PROCEDURE — 99999 PR PBB SHADOW E&M-EST. PATIENT-LVL IV: ICD-10-PCS | Mod: PBBFAC,,, | Performed by: OTOLARYNGOLOGY

## 2020-07-16 PROCEDURE — 31575 DIAGNOSTIC LARYNGOSCOPY: CPT | Mod: PBBFAC,PN | Performed by: OTOLARYNGOLOGY

## 2020-07-16 NOTE — PROGRESS NOTES
"Otolaryngology Clinic Note    Maddy Lopez  Encounter Date: 7/16/2020   YOB: 1950  Referring Physician: Adelfo Castellanos Iii, Md  [address]  [place] [address]   PCP: Juaquin Alves MD    Chief Complaint:   Chief Complaint   Patient presents with    Sore Throat     throat pain that started 2 months ago, but not constant.        HPI: Maddy Lopez is a 69 y.o. female here for intermittent throat pain. Started two months ago. Pain is very brief. Only last a few seconds. Comes and goes. No persistent pain or discomfort. No trouble swallowing, hoarseness, otalgia, odynophagia, hemoptysis, weight loss. Records reported a "lymph node excision" done by ENT in [place]. Patient does not know name or practice. On further questioning she never had any external excision and said they cut something out of her throat that was interfering with voice and swallowing. On further questioning thinks it was a polyp.    No history of tobacco use.     Review of Systems   Constitutional: Negative for chills, fever and weight loss.   HENT: Positive for sore throat. Negative for congestion, ear pain and sinus pain.    Eyes: Negative for blurred vision and double vision.   Respiratory: Negative for cough and shortness of breath.    Cardiovascular: Negative for chest pain and palpitations.   Gastrointestinal: Negative for nausea and vomiting.   Musculoskeletal: Negative for joint pain and neck pain.   Skin: Negative for rash.   Neurological: Negative for dizziness, focal weakness and headaches.   Psychiatric/Behavioral: Negative for depression. The patient is not nervous/anxious.         Review of patient's allergies indicates:   Allergen Reactions    Aspirin Other (See Comments)     Causes bruising         Past Medical History:   Diagnosis Date    Arthritis     Fever blister     Hypertension     Prediabetes 6/16/2020       Past Surgical History:   Procedure Laterality Date    COLONOSCOPY " N/A 10/24/2018    Procedure: COLONOSCOPY/Golytely;  Surgeon: Natty Bradshaw MD;  Location: [facility];  Service: Endoscopy;  Laterality: N/A;    LEVATOR DEHISCENCE Bilateral 2013       Social History     Socioeconomic History    Marital status: Single     Spouse name: Not on file    Number of children: Not on file    Years of education: Not on file    Highest education level: Not on file   Occupational History    Not on file   Social Needs    Financial resource strain: Not on file    Food insecurity     Worry: Not on file     Inability: Not on file    Transportation needs     Medical: Not on file     Non-medical: Not on file   Tobacco Use    Smoking status: Never Smoker    Smokeless tobacco: Never Used   Substance and Sexual Activity    Alcohol use: No     Frequency: Never    Drug use: No    Sexual activity: Never   Lifestyle    Physical activity     Days per week: Not on file     Minutes per session: Not on file    Stress: Not on file   Relationships    Social connections     Talks on phone: Not on file     Gets together: Not on file     Attends Baptism service: Not on file     Active member of club or organization: Not on file     Attends meetings of clubs or organizations: Not on file     Relationship status: Not on file   Other Topics Concern    Are you pregnant or think you may be? Not Asked    Breast-feeding Not Asked   Social History Narrative    Not on file       Family History   Problem Relation Age of Onset    Macular degeneration Mother     Blindness Mother     Diabetes Mother     Hypertension Mother     Glaucoma Father     Blindness Father     Cancer Father     Amblyopia Neg Hx     Cataracts Neg Hx     Retinal detachment Neg Hx     Strabismus Neg Hx     Stroke Neg Hx     Thyroid disease Neg Hx     Melanoma Neg Hx        Outpatient Encounter Medications as of 7/16/2020   Medication Sig Dispense Refill    amLODIPine (NORVASC) 2.5 MG tablet Take 1 tablet (2.5 mg  total) by mouth once daily. 90 tablet 0    atorvastatin (LIPITOR) 40 MG tablet Take 1 tablet (40 mg total) by mouth once daily. 90 tablet 0    cholecalciferol, vitamin D3, (VITAMIN D3) 2,000 unit Cap Take 1 capsule by mouth once daily.      erenumab-aooe (AIMOVIG AUTOINJECTOR) 140 mg/mL AtIn Inject 140 mg into the skin every 28 days. 3 Syringe 3    frovatriptan (FROVA) 2.5 MG tablet Take 1 tablet (2.5 mg total) by mouth as needed for Migraine. If recurs, may repeat once after 2 hours. Don't exceed 3 per week. 36 tablet 1    propranoloL (INDERAL) 20 MG tablet Take 1 tablet (20 mg total) by mouth 2 (two) times daily. 180 tablet 0    venlafaxine (EFFEXOR-XR) 150 MG Cp24 TAKE 1 CAPSULE DAILY ALONG WITH 75 MG CAPSULE FOR TOTAL DAILY DOSE  MG 90 capsule 0    venlafaxine (EFFEXOR-XR) 75 MG 24 hr capsule TAKE 1 CAPSULE DAILY (ALONG WITH 150 MG CAPSULE FOR TOTAL DAILY DOSE  MG) 90 capsule 0     No facility-administered encounter medications on file as of 7/16/2020.        Physical Exam:    Vitals:    07/16/20 1313   BP: 136/81   Pulse: 85       Constitutional  General Appearance: well nourished, well-developed, alert, oriented, in no acute distress  Communication: ability, understanding, voice quality normal  Head and Face  Inspection: normocephalic, atraumatic, no scars, lesions or masses    Palpation: no stepoffs, sinus tenderness or masses  Parotid glands: no masses, stones, swelling or tenderness  Eyes  Ocular Motility / Alignment: normal alignment, motility, no proptosis, enophthalmus or nystagmus  Conjunctiva: not injected  Eyelids: no entropion or ectropion, no edema  Ears  Hearing: speech reception thresholds grossly normal  External Ears: no auricle lesions, non-tender, mobile to palpation  Otoscopy:  Right Ear: no tympanic membrane lesions, perforations, or effusion, normal EAC  Left Ear: no tympanic membrane lesions, perforations, or effusion, normal EAC  Nose  External Nose: no lesions,  tenderness, trauma or deformity  Intranasal Exam: no edema, erythema, discharge, mass or obstruction  Oral Cavity / Oropharynx  Lips: upper and lower lips pink and moist  Gingiva: healthy  Oral Mucosa: moist, no mucosal lesions  Floor of Mouth: normal, no lesions  Tongue: moist, normal mobility, no lesions  Palate: soft and hard palates without lesions or ulcers  Oropharynx: tonsils and walls without erythema, exudate, lesions, fullness or obstruction  Neck  Inspection and Palpation: no erythema, induration, emphysema, tenderness or masses  Larynx and Trachea: normal position and mobility   Thyroid: no tenderness, enlargement or nodules  Submandibular Glands: no masses or tenderness  Lymphatic:  Anterior, Posterior, Submandibular, Submental, Supraclavicular: no lymphadenopathy present  Chest / Respiratory  Chest: no stridor or retractions, normal effort and expansion  Cardiovascular:  Pulses: 2+ radial pulses, regular rhythm and rate  Auscultation: deferred  Neurological  Cranial Nerves: grossly intact  General: no focal deficits  Psychiatric  Orientation: oriented to time, place and person  Mood and Affect: no depression, anxiety or agitation  Extremities  Inspection: moves all extremities well    Procedures:    Flexible Fiberoptic Laryngoscopy    Indications: throat pain    Anesthesia: Topical xylocaine with hattie-synephrine    Complications: None    Findings: no abnormal findings    Procedure in Detail: After verbal consent obtained and risks, benefits and alternatives were discussed with the patient, nares were decongested and anesthetized, and flexible fiberoptic laryngoscope passed via left nare with following results:  - Nasal cavity: no septal deviation, no inferior turbinate hypertrophy, no mass or lesion  - Nasopharynx: no adenoid hypertrophy, no mass or lesion  - Oropharynx: no lingual tonsil asymmetry, no mass or lesion  - Hypopharynx: no mass or lesion  - Supraglottis: no mass or lesion, no significant  interarytenoid edema or erythema  - Glottis: bilateral true vocal cords with normal mobility, no edema, no mass or lesion    Patient tolerated the procedure well    Pertinent Data:  ? LABS:   ? AUDIO:  ? PATH:  ? CULTURE:    I personally reviewed the following pertinent data at today's visit:    Imaging:   ? XRAY:  ? Ultrasound:  ? CT Scan:  ? MRI Scan:  ? PET/CT Scan:    I personally reviewed the following images:    Miscellaneous:     [place] Sleepiness Scale-     Reflux Symptom Index (RSI) -       Summary of Outside Records:      Assessment and Plan:  Maddy Lopez is a 69 y.o. female with     Throat pain  -     Ambulatory referral/consult to ENT    Flexible laryngoscopy and physical exam normal. Sounds like she likely had a laryngeal polyp removed. No concerning symptoms. Instructed to let us know if pain becomes persistent/worsens or does not go away. RTC as neede E. Grier de Lauréal, MD [facility]

## 2020-07-16 NOTE — PROCEDURES
Flexible Fiberoptic Laryngoscopy    Indications: throat pain    Anesthesia: Topical xylocaine with hattie-synephrine    Complications: None    Findings: no abnormal findings    Procedure in Detail: After verbal consent obtained and risks, benefits and alternatives were discussed with the patient, nares were decongested and anesthetized, and flexible fiberoptic laryngoscope passed via left nare with following results:  - Nasal cavity: no septal deviation, no inferior turbinate hypertrophy, no mass or lesion  - Nasopharynx: no adenoid hypertrophy, no mass or lesion  - Oropharynx: no lingual tonsil asymmetry, no mass or lesion  - Hypopharynx: no mass or lesion  - Supraglottis: no mass or lesion, no significant interarytenoid edema or erythema  - Glottis: bilateral true vocal cords with normal mobility, no edema, no mass or lesion    Patient tolerated the procedure well

## 2020-08-13 ENCOUNTER — LAB VISIT (OUTPATIENT)
Dept: LAB | Facility: HOSPITAL | Age: 70
End: 2020-08-13
Attending: INTERNAL MEDICINE
Payer: MEDICARE

## 2020-08-13 ENCOUNTER — OFFICE VISIT (OUTPATIENT)
Dept: INTERNAL MEDICINE | Facility: CLINIC | Age: 70
End: 2020-08-13
Payer: MEDICARE

## 2020-08-13 VITALS
HEART RATE: 82 BPM | WEIGHT: 142 LBS | BODY MASS INDEX: 24.37 KG/M2 | TEMPERATURE: 98 F | SYSTOLIC BLOOD PRESSURE: 118 MMHG | OXYGEN SATURATION: 99 % | DIASTOLIC BLOOD PRESSURE: 66 MMHG

## 2020-08-13 DIAGNOSIS — E78.5 HYPERLIPIDEMIA, UNSPECIFIED HYPERLIPIDEMIA TYPE: ICD-10-CM

## 2020-08-13 DIAGNOSIS — R51.9 CHRONIC NONINTRACTABLE HEADACHE, UNSPECIFIED HEADACHE TYPE: ICD-10-CM

## 2020-08-13 DIAGNOSIS — Z80.0 FAMILY HISTORY OF COLON CANCER IN FATHER: ICD-10-CM

## 2020-08-13 DIAGNOSIS — Z12.31 SCREENING MAMMOGRAM, ENCOUNTER FOR: ICD-10-CM

## 2020-08-13 DIAGNOSIS — R73.03 PREDIABETES: ICD-10-CM

## 2020-08-13 DIAGNOSIS — G89.29 CHRONIC NONINTRACTABLE HEADACHE, UNSPECIFIED HEADACHE TYPE: ICD-10-CM

## 2020-08-13 DIAGNOSIS — M85.80 OSTEOPENIA, UNSPECIFIED LOCATION: ICD-10-CM

## 2020-08-13 DIAGNOSIS — I10 ESSENTIAL HYPERTENSION: ICD-10-CM

## 2020-08-13 DIAGNOSIS — Z76.89 ENCOUNTER TO ESTABLISH CARE: ICD-10-CM

## 2020-08-13 LAB
ESTIMATED AVG GLUCOSE: 131 MG/DL (ref 68–131)
HBA1C MFR BLD HPLC: 6.2 % (ref 4–5.6)

## 2020-08-13 PROCEDURE — 99214 OFFICE O/P EST MOD 30 MIN: CPT | Mod: S$PBB,,, | Performed by: INTERNAL MEDICINE

## 2020-08-13 PROCEDURE — 99999 PR PBB SHADOW E&M-EST. PATIENT-LVL IV: CPT | Mod: PBBFAC,,, | Performed by: INTERNAL MEDICINE

## 2020-08-13 PROCEDURE — 83036 HEMOGLOBIN GLYCOSYLATED A1C: CPT

## 2020-08-13 PROCEDURE — 99214 OFFICE O/P EST MOD 30 MIN: CPT | Mod: PBBFAC,PO | Performed by: INTERNAL MEDICINE

## 2020-08-13 PROCEDURE — 36415 COLL VENOUS BLD VENIPUNCTURE: CPT | Mod: PO

## 2020-08-13 PROCEDURE — 99999 PR PBB SHADOW E&M-EST. PATIENT-LVL IV: ICD-10-PCS | Mod: PBBFAC,,, | Performed by: INTERNAL MEDICINE

## 2020-08-13 PROCEDURE — 99214 PR OFFICE/OUTPT VISIT, EST, LEVL IV, 30-39 MIN: ICD-10-PCS | Mod: S$PBB,,, | Performed by: INTERNAL MEDICINE

## 2020-08-13 RX ORDER — PROPRANOLOL HYDROCHLORIDE 20 MG/1
20 TABLET ORAL 2 TIMES DAILY
Qty: 180 TABLET | Refills: 3 | Status: SHIPPED | OUTPATIENT
Start: 2020-08-13 | End: 2021-09-18 | Stop reason: SDUPTHER

## 2020-08-13 RX ORDER — VENLAFAXINE HYDROCHLORIDE 150 MG/1
CAPSULE, EXTENDED RELEASE ORAL
Qty: 90 CAPSULE | Refills: 3 | Status: SHIPPED | OUTPATIENT
Start: 2020-08-13 | End: 2021-09-18 | Stop reason: SDUPTHER

## 2020-08-13 RX ORDER — AMLODIPINE BESYLATE 2.5 MG/1
2.5 TABLET ORAL DAILY
Qty: 90 TABLET | Refills: 3 | Status: SHIPPED | OUTPATIENT
Start: 2020-08-13 | End: 2021-06-21 | Stop reason: SDUPTHER

## 2020-08-13 RX ORDER — VENLAFAXINE HYDROCHLORIDE 75 MG/1
CAPSULE, EXTENDED RELEASE ORAL
Qty: 90 CAPSULE | Refills: 3 | Status: SHIPPED | OUTPATIENT
Start: 2020-08-13 | End: 2021-09-18 | Stop reason: SDUPTHER

## 2020-08-13 RX ORDER — ATORVASTATIN CALCIUM 40 MG/1
40 TABLET, FILM COATED ORAL DAILY
Qty: 90 TABLET | Refills: 3 | Status: SHIPPED | OUTPATIENT
Start: 2020-08-13 | End: 2021-09-18 | Stop reason: SDUPTHER

## 2020-08-13 NOTE — PATIENT INSTRUCTIONS
Your DXA scan shows osteopenia. I recommend you take calcium and vitamin D for your bone health. I would recommend Calcium 2283-6866 mg daily and vitamin D 800-1000 units daily along with adequate exercise, core strengthening, and increasing lower body weight bearing exercises.     I recommend improving diet (limiting sugars and starches) and increasing exercise (to at least 150 minutes per week) in order to improve your blood sugars.     Starches: breads, rice, pasta    And we will recheck our A1c test in 3 months.     Check BP once daily at home. If blood pressure is consistently less 110/60, we can consider stopping amlodipine at next visit.

## 2020-10-14 ENCOUNTER — IMMUNIZATION (OUTPATIENT)
Dept: PHARMACY | Facility: CLINIC | Age: 70
End: 2020-10-14
Payer: MEDICARE

## 2020-10-15 ENCOUNTER — HOSPITAL ENCOUNTER (OUTPATIENT)
Dept: RADIOLOGY | Facility: HOSPITAL | Age: 70
Discharge: HOME | End: 2020-10-15
Attending: INTERNAL MEDICINE
Payer: MEDICARE

## 2020-10-15 DIAGNOSIS — Z12.31 SCREENING MAMMOGRAM, ENCOUNTER FOR: ICD-10-CM

## 2020-10-15 PROCEDURE — 77067 SCR MAMMO BI INCL CAD: CPT | Mod: TC

## 2020-10-15 PROCEDURE — 77063 MAMMO DIGITAL SCREENING BILAT WITH TOMO: ICD-10-PCS | Mod: 26,,, | Performed by: RADIOLOGY

## 2020-10-15 PROCEDURE — 77063 BREAST TOMOSYNTHESIS BI: CPT | Mod: 26,,, | Performed by: RADIOLOGY

## 2020-10-15 PROCEDURE — 77067 MAMMO DIGITAL SCREENING BILAT WITH TOMO: ICD-10-PCS | Mod: 26,,, | Performed by: RADIOLOGY

## 2020-10-15 PROCEDURE — 77067 SCR MAMMO BI INCL CAD: CPT | Mod: 26,,, | Performed by: RADIOLOGY

## 2020-11-13 ENCOUNTER — LAB VISIT (OUTPATIENT)
Dept: LAB | Facility: HOSPITAL | Age: 70
End: 2020-11-13
Attending: INTERNAL MEDICINE
Payer: MEDICARE

## 2020-11-13 DIAGNOSIS — R73.03 PREDIABETES: ICD-10-CM

## 2020-11-13 LAB
ESTIMATED AVG GLUCOSE: 126 MG/DL (ref 68–131)
HBA1C MFR BLD HPLC: 6 % (ref 4–5.6)

## 2020-11-13 PROCEDURE — 36415 COLL VENOUS BLD VENIPUNCTURE: CPT | Mod: PO

## 2020-11-13 PROCEDURE — 83036 HEMOGLOBIN GLYCOSYLATED A1C: CPT

## 2020-11-16 ENCOUNTER — OFFICE VISIT (OUTPATIENT)
Dept: INTERNAL MEDICINE | Facility: CLINIC | Age: 70
End: 2020-11-16
Payer: MEDICARE

## 2020-11-16 VITALS
BODY MASS INDEX: 23.34 KG/M2 | WEIGHT: 136.69 LBS | TEMPERATURE: 97 F | HEART RATE: 84 BPM | HEIGHT: 64 IN | OXYGEN SATURATION: 99 % | DIASTOLIC BLOOD PRESSURE: 74 MMHG | SYSTOLIC BLOOD PRESSURE: 134 MMHG

## 2020-11-16 DIAGNOSIS — R73.03 PREDIABETES: Primary | ICD-10-CM

## 2020-11-16 DIAGNOSIS — M85.80 OSTEOPENIA, UNSPECIFIED LOCATION: ICD-10-CM

## 2020-11-16 DIAGNOSIS — I10 ESSENTIAL HYPERTENSION: ICD-10-CM

## 2020-11-16 DIAGNOSIS — R09.89 TICKLE IN THROAT: ICD-10-CM

## 2020-11-16 PROCEDURE — 99214 PR OFFICE/OUTPT VISIT, EST, LEVL IV, 30-39 MIN: ICD-10-PCS | Mod: S$PBB,,, | Performed by: INTERNAL MEDICINE

## 2020-11-16 PROCEDURE — 99999 PR PBB SHADOW E&M-EST. PATIENT-LVL IV: CPT | Mod: PBBFAC,,, | Performed by: INTERNAL MEDICINE

## 2020-11-16 PROCEDURE — 99214 OFFICE O/P EST MOD 30 MIN: CPT | Mod: S$PBB,,, | Performed by: INTERNAL MEDICINE

## 2020-11-16 PROCEDURE — 99214 OFFICE O/P EST MOD 30 MIN: CPT | Mod: PBBFAC,PO | Performed by: INTERNAL MEDICINE

## 2020-11-16 PROCEDURE — 99999 PR PBB SHADOW E&M-EST. PATIENT-LVL IV: ICD-10-PCS | Mod: PBBFAC,,, | Performed by: INTERNAL MEDICINE

## 2020-11-16 NOTE — PROGRESS NOTES
Patient ID: Maddy Lopez is a 70 y.o. female.    Chief Complaint: Follow-up, Diabetes, and Hypertension    HPI Maddy is a 70 y.o. female with prediabetes, hypertension, hyperlipidemia, arthritis, and osteopenia who presents for follow-up of these medical conditions.  Patient has brought a list of dietary questions.  She would like to ensure that her blood pressure is well controlled, her blood sugars are well controlled, and she is able to lose weight.  She typically also walks 3 miles per day, 3 days per week.  She checks her blood pressure at home, has brought this log today and her blood pressure is typically 110-120s/60s-70s. She is wondering if she can reduce her BP medications.   Still has abnormal sensation in throat which lasts for seconds in duration when it occurs. Saw ENT for this and had normal scope. No PND or GERD    Review of Systems   All other systems reviewed and are negative.      Objective:     Vitals:    11/16/20 0934   BP: 134/74   Pulse: 84   Temp: 97.2 °F (36.2 °C)        Physical Exam  Vitals signs reviewed.   Constitutional:       General: She is not in acute distress.     Appearance: Normal appearance. She is not ill-appearing, toxic-appearing or diaphoretic.   HENT:      Head: Normocephalic and atraumatic.      Right Ear: External ear normal.      Left Ear: External ear normal.      Nose: Nose normal.   Eyes:      Extraocular Movements: Extraocular movements intact.      Conjunctiva/sclera: Conjunctivae normal.   Pulmonary:      Effort: Pulmonary effort is normal. No respiratory distress.   Neurological:      General: No focal deficit present.      Mental Status: She is alert and oriented to person, place, and time. Mental status is at baseline.   Psychiatric:         Mood and Affect: Mood normal.         Behavior: Behavior normal.         Thought Content: Thought content normal.         Judgment: Judgment normal.         Assessment:       1. Prediabetes Active   2. Osteopenia,  unspecified location Active   3. Essential hypertension Well controlled   4. Tickle in throat        Plan:         Prediabetes  Comments:  Discussed diet at length with patient using AVS handout (see AVS) as a guide  Orders:  -     Hemoglobin A1C; Future; Expected date: 02/16/2021    Osteopenia, unspecified location  Comments:  See instructions in AVS    Essential hypertension  Comments:  Continue current medications    Tickle in throat  Comments:  Etiology unclear. Return to ENT if it recurs      25 min spent in room face-to-face with patient with greater than 50% of time spent in discussion of appropriate diet for improvement in blood sugars.    RTC 3 months     Warning signs discussed, patient to call with any further issues or worsening of symptoms.       Parts of the above note were dictated using a voice dictation software. Please excuse any grammatical or typographical errors.

## 2020-11-16 NOTE — PATIENT INSTRUCTIONS
Healthy Meals for Diabetes     A healthcare provider will help you develop a meal plan that fits your needs.   Ask your healthcare team to help you make a meal plan that fits your needs. Your meal plan tells you when to eat your meals and snacks, what kinds of foods to eat, and how much of each food to eat. You dont have to give up all the foods you like. But you do need to follow some guidelines.  Choose healthy carbohydrates  Starches, sugars, and fiber are all types of carbohydrates. Fiber can help lower your cholesterol and triglycerides. Fiber is also healthy for your heart. You should have 20 to 35 grams of total fiber each day. Fiber-rich foods include:  · Whole-grain breads and cereals  · Bulgur wheat  · Brown rice     · Whole-wheat pasta  · Fruits and vegetables  · Dry beans, and peas   Keep track of the amount of carbohydrates you eat. This can help you keep the right balance of physical activity and medicine. The amount of carbohydrates needed will vary for each person. It depends on many things such as your health, the medicines you take, and how active you are. Your healthcare team will help you figure out the right amount of carbohydrates for you. You may start with around 45 to 60 grams of carbohydrates per meal, depending on your situation.   Here are some examples of foods containing about 15 grams of carbohydrates (1 serving of carbohydrates):  · 1/2 cup of canned or frozen fruit  · A small piece of fresh fruit (4 ounces)  · 1 slice of bread  · 1/2 cup of oatmeal  · 1/3 cup of rice  · 4 to 6 crackers  · 1/2 English muffin  · 1/2 cup of black beans  · 1/4 of a large baked potato (3 ounces)  · 2/3 cup of plain fat-free yogurt  · 1 cup of soup  · 1/2 cup of casserole  · 6 chicken nuggets  · 2-inch-square brownie or cake without frosting  · 2 small cookies  · 1/2 cup of ice cream or sherbet  Choose healthy protein foods  Eating protein that is low in fat can help you control your weight. It also  helps keep your heart healthy. Low-fat protein foods include:  · Fish  · Plant proteins, such as dry beans and peas, nuts, and soy products like tofu and soymilk  · Lean meat with all visible fat removed  · Poultry with the skin removed  · Low-fat or nonfat milk, cheese, and yogurt  Limit unhealthy fats and sugar  Saturated and trans fats are unhealthy for your heart. They raise LDL (bad) cholesterol. Fat is also high in calories, so it can make you gain weight. To cut down on unhealthy fats and sugar, limit these foods:  · Butter or margarine  · Palm and palm kernel oils and coconut oil  · Cream  · Cheese  · Mckenzie  · Lunch meats     · Ice cream  · Sweet bakery goods such as pies, muffins, and donuts  · Jams and jellies  · Candy bars  · Regular sodas   How much to eat  The amount of food you eat affects your blood sugar. It also affects your weight. Your healthcare team will tell you how much of each type of food you should eat.  · Use measuring cups and spoons and a food scale to measure serving sizes.  · Learn what a correct serving size looks like on your plate. This will help when you are away from home and cant measure your servings.  · Eat only the number of servings given on your meal plan for each food. Dont take seconds.  · Learn to read food labels. Be sure to look at serving size, total carbohydrates, fiber, calories, sugar, and saturated and trans fats. Look for healthier alternatives to foods that have added sugar.  · Plan ahead for parties so you can still have a good time without going overboard with unhealthy food choices. Set a good example yourself by bringing a healthy dish to pot lucks.   Choose healthy snacks  When it comes to snacks, we usually think about foods with added sugar and fats. But there are many other options for healthier snack choices. Here are a few snack ideas to choose from:  Snacks with less than 5 grams of carbohydrates  · 1 piece of string cheese  · 3 celery sticks plus 1  tablespoon of peanut butter  · 5 cherry tomatoes plus 1 tablespoon of ranch dressing  · 1 hard-boiled egg  · 1/4 cup of fresh blueberries  ·  5 baby carrots  · 1 cup of light popcorn  · 1/2 cup of sugar-free gelatin  · 15 almonds  Snacks with about 10 to 20 grams of carbohydrates  · 1/3 cup of hummus plus 1 cup of fresh cut nonstarchy vegetables (carrots, green peppers, broccoli, celery, or a combination)  · 1/2 cup of fresh or canned fruit plus 1/4 cup of cottage cheese  · 1/2 cup of tuna salad with 4 crackers  · 2 rice cakes and a tablespoon of peanut butter  · 1 small apple or orange  · 3 cups light popcorn  · 1/2 of a turkey sandwich (1 slice of whole-wheat bread, 2 ounces of turkey, and mustard)  Portion sizes are important to controlling your blood sugar and staying at a healthy weight. Stock up on healthy snack items so you always have them on hand.  When to eat  Your meal plan will likely include breakfast, lunch, dinner, and some snacks.  · Try to eat your meals and snacks at about the same times each day.  · Eat all your meals and snacks. Skipping a meal or snack can make your blood sugar drop too low. It can also cause you to eat too much at the next meal or snack. Then your blood sugar could get too high.  Date Last Reviewed: 7/1/2016  © 0748-9487 [employer]. [address] [place] [address], [place] [address]. All rights reserved. This information is not intended as a substitute for professional medical care. Always follow your healthcare professional's instructions.      If BP goes <110/60 consistently please let me know and we will cut back on your BP medication.  If BP >150/90, please let me know.    Your DXA scan shows osteopenia. I recommend you take calcium and vitamin D for your bone health. I would recommend Calcium 7126-0270 mg daily and vitamin D 800-1000 units daily along with adequate exercise, core strengthening, and increasing lower body weight bearing exercises.

## 2020-12-11 ENCOUNTER — PATIENT MESSAGE (OUTPATIENT)
Dept: OTHER | Facility: OTHER | Age: 70
End: 2020-12-11

## 2020-12-15 ENCOUNTER — PATIENT MESSAGE (OUTPATIENT)
Dept: INTERNAL MEDICINE | Facility: CLINIC | Age: 70
End: 2020-12-15

## 2021-01-24 ENCOUNTER — PATIENT MESSAGE (OUTPATIENT)
Dept: INTERNAL MEDICINE | Facility: CLINIC | Age: 71
End: 2021-01-24

## 2021-02-26 ENCOUNTER — PATIENT MESSAGE (OUTPATIENT)
Dept: INTERNAL MEDICINE | Facility: CLINIC | Age: 71
End: 2021-02-26

## 2021-03-10 ENCOUNTER — LAB VISIT (OUTPATIENT)
Dept: LAB | Facility: HOSPITAL | Age: 71
End: 2021-03-10
Attending: INTERNAL MEDICINE
Payer: MEDICARE

## 2021-03-10 DIAGNOSIS — R73.03 PREDIABETES: ICD-10-CM

## 2021-03-10 PROCEDURE — 36415 COLL VENOUS BLD VENIPUNCTURE: CPT | Mod: PO | Performed by: INTERNAL MEDICINE

## 2021-03-10 PROCEDURE — 83036 HEMOGLOBIN GLYCOSYLATED A1C: CPT | Performed by: INTERNAL MEDICINE

## 2021-03-11 LAB
ESTIMATED AVG GLUCOSE: 126 MG/DL (ref 68–131)
HBA1C MFR BLD: 6 % (ref 4–5.6)

## 2021-03-12 ENCOUNTER — OFFICE VISIT (OUTPATIENT)
Dept: INTERNAL MEDICINE | Facility: CLINIC | Age: 71
End: 2021-03-12
Payer: MEDICARE

## 2021-03-12 VITALS
HEART RATE: 90 BPM | WEIGHT: 138.25 LBS | SYSTOLIC BLOOD PRESSURE: 126 MMHG | DIASTOLIC BLOOD PRESSURE: 74 MMHG | BODY MASS INDEX: 23.73 KG/M2 | TEMPERATURE: 97 F | OXYGEN SATURATION: 98 %

## 2021-03-12 DIAGNOSIS — G43.909 MIGRAINE WITHOUT STATUS MIGRAINOSUS, NOT INTRACTABLE, UNSPECIFIED MIGRAINE TYPE: ICD-10-CM

## 2021-03-12 DIAGNOSIS — I10 ESSENTIAL HYPERTENSION: ICD-10-CM

## 2021-03-12 DIAGNOSIS — E78.5 HYPERLIPIDEMIA, UNSPECIFIED HYPERLIPIDEMIA TYPE: ICD-10-CM

## 2021-03-12 DIAGNOSIS — R79.89 ABNORMAL CBC MEASUREMENT: ICD-10-CM

## 2021-03-12 DIAGNOSIS — R73.03 PREDIABETES: ICD-10-CM

## 2021-03-12 DIAGNOSIS — Z00.00 ANNUAL PHYSICAL EXAM: ICD-10-CM

## 2021-03-12 DIAGNOSIS — M85.80 OSTEOPENIA, UNSPECIFIED LOCATION: ICD-10-CM

## 2021-03-12 PROCEDURE — 99214 OFFICE O/P EST MOD 30 MIN: CPT | Mod: S$PBB,,, | Performed by: INTERNAL MEDICINE

## 2021-03-12 PROCEDURE — 99999 PR PBB SHADOW E&M-EST. PATIENT-LVL III: CPT | Mod: PBBFAC,,, | Performed by: INTERNAL MEDICINE

## 2021-03-12 PROCEDURE — 99214 PR OFFICE/OUTPT VISIT, EST, LEVL IV, 30-39 MIN: ICD-10-PCS | Mod: S$PBB,,, | Performed by: INTERNAL MEDICINE

## 2021-03-12 PROCEDURE — 99999 PR PBB SHADOW E&M-EST. PATIENT-LVL III: ICD-10-PCS | Mod: PBBFAC,,, | Performed by: INTERNAL MEDICINE

## 2021-03-12 PROCEDURE — 99213 OFFICE O/P EST LOW 20 MIN: CPT | Mod: PBBFAC,PO | Performed by: INTERNAL MEDICINE

## 2021-03-14 ENCOUNTER — PATIENT MESSAGE (OUTPATIENT)
Dept: INTERNAL MEDICINE | Facility: CLINIC | Age: 71
End: 2021-03-14

## 2021-03-14 DIAGNOSIS — G43.909 MIGRAINE SYNDROME: ICD-10-CM

## 2021-03-15 RX ORDER — FROVATRIPTAN SUCCINATE 2.5 MG/1
2.5 TABLET, FILM COATED ORAL
Qty: 36 TABLET | Refills: 1 | Status: SHIPPED | OUTPATIENT
Start: 2021-03-15 | End: 2022-03-17 | Stop reason: SDUPTHER

## 2021-03-16 ENCOUNTER — PATIENT MESSAGE (OUTPATIENT)
Dept: INTERNAL MEDICINE | Facility: CLINIC | Age: 71
End: 2021-03-16

## 2021-03-25 ENCOUNTER — TELEPHONE (OUTPATIENT)
Dept: INTERNAL MEDICINE | Facility: CLINIC | Age: 71
End: 2021-03-25

## 2021-03-25 DIAGNOSIS — Z12.31 SCREENING MAMMOGRAM, ENCOUNTER FOR: Primary | ICD-10-CM

## 2021-06-05 ENCOUNTER — PATIENT OUTREACH (OUTPATIENT)
Dept: ADMINISTRATIVE | Facility: OTHER | Age: 71
End: 2021-06-05

## 2021-06-07 ENCOUNTER — OFFICE VISIT (OUTPATIENT)
Dept: OPTOMETRY | Facility: CLINIC | Age: 71
End: 2021-06-07
Payer: MEDICARE

## 2021-06-07 DIAGNOSIS — H52.7 REFRACTIVE ERROR: ICD-10-CM

## 2021-06-07 DIAGNOSIS — H25.13 NUCLEAR SCLEROSIS OF BOTH EYES: Primary | ICD-10-CM

## 2021-06-07 DIAGNOSIS — H02.401 PTOSIS OF RIGHT EYELID: ICD-10-CM

## 2021-06-07 DIAGNOSIS — H04.123 BILATERAL DRY EYES: ICD-10-CM

## 2021-06-07 DIAGNOSIS — H43.812 POSTERIOR VITREOUS DETACHMENT OF LEFT EYE: ICD-10-CM

## 2021-06-07 PROCEDURE — 92014 COMPRE OPH EXAM EST PT 1/>: CPT | Mod: S$PBB,,, | Performed by: OPTOMETRIST

## 2021-06-07 PROCEDURE — 99999 PR PBB SHADOW E&M-EST. PATIENT-LVL III: ICD-10-PCS | Mod: PBBFAC,,, | Performed by: OPTOMETRIST

## 2021-06-07 PROCEDURE — 92014 PR EYE EXAM, EST PATIENT,COMPREHESV: ICD-10-PCS | Mod: S$PBB,,, | Performed by: OPTOMETRIST

## 2021-06-07 PROCEDURE — 99213 OFFICE O/P EST LOW 20 MIN: CPT | Mod: PBBFAC,PO | Performed by: OPTOMETRIST

## 2021-06-07 PROCEDURE — 99999 PR PBB SHADOW E&M-EST. PATIENT-LVL III: CPT | Mod: PBBFAC,,, | Performed by: OPTOMETRIST

## 2021-06-07 RX ORDER — A/SINGAPORE/GP1908/2015 IVR-180 (AN A/MICHIGAN/45/2015 (H1N1)PDM09-LIKE VIRUS, A/HONG KONG/4801/2014, NYMC X-263B (H3N2) (AN A/HONG KONG/4801/2014-LIKE VIRUS), AND B/BRISBANE/60/2008, WILD TYPE (A B/BRISBANE/60/2008-LIKE VIRUS) 15; 15; 15 UG/.5ML; UG/.5ML; UG/.5ML
INJECTION, SUSPENSION INTRAMUSCULAR
COMMUNITY
Start: 2020-10-13 | End: 2022-09-19

## 2021-06-09 ENCOUNTER — LAB VISIT (OUTPATIENT)
Dept: LAB | Facility: HOSPITAL | Age: 71
End: 2021-06-09
Attending: INTERNAL MEDICINE
Payer: MEDICARE

## 2021-06-09 DIAGNOSIS — R73.03 PREDIABETES: ICD-10-CM

## 2021-06-09 DIAGNOSIS — R79.89 ABNORMAL CBC MEASUREMENT: ICD-10-CM

## 2021-06-09 DIAGNOSIS — Z00.00 ANNUAL PHYSICAL EXAM: ICD-10-CM

## 2021-06-09 DIAGNOSIS — E78.5 HYPERLIPIDEMIA, UNSPECIFIED HYPERLIPIDEMIA TYPE: ICD-10-CM

## 2021-06-09 LAB
ALBUMIN SERPL BCP-MCNC: 4 G/DL (ref 3.5–5.2)
ALP SERPL-CCNC: 115 U/L (ref 55–135)
ALT SERPL W/O P-5'-P-CCNC: 27 U/L (ref 10–44)
ANION GAP SERPL CALC-SCNC: 7 MMOL/L (ref 8–16)
AST SERPL-CCNC: 16 U/L (ref 10–40)
BASOPHILS # BLD AUTO: 0.04 K/UL (ref 0–0.2)
BASOPHILS NFR BLD: 0.5 % (ref 0–1.9)
BILIRUB SERPL-MCNC: 0.3 MG/DL (ref 0.1–1)
BUN SERPL-MCNC: 14 MG/DL (ref 8–23)
CALCIUM SERPL-MCNC: 9.3 MG/DL (ref 8.7–10.5)
CHLORIDE SERPL-SCNC: 103 MMOL/L (ref 95–110)
CHOLEST SERPL-MCNC: 157 MG/DL (ref 120–199)
CHOLEST/HDLC SERPL: 4.4 {RATIO} (ref 2–5)
CO2 SERPL-SCNC: 27 MMOL/L (ref 23–29)
CREAT SERPL-MCNC: 1 MG/DL (ref 0.5–1.4)
DIFFERENTIAL METHOD: ABNORMAL
EOSINOPHIL # BLD AUTO: 0.1 K/UL (ref 0–0.5)
EOSINOPHIL NFR BLD: 1.2 % (ref 0–8)
ERYTHROCYTE [DISTWIDTH] IN BLOOD BY AUTOMATED COUNT: 15.1 % (ref 11.5–14.5)
EST. GFR  (AFRICAN AMERICAN): >60 ML/MIN/1.73 M^2
EST. GFR  (NON AFRICAN AMERICAN): 58 ML/MIN/1.73 M^2
ESTIMATED AVG GLUCOSE: 134 MG/DL (ref 68–131)
GLUCOSE SERPL-MCNC: 108 MG/DL (ref 70–110)
HBA1C MFR BLD: 6.3 % (ref 4–5.6)
HCT VFR BLD AUTO: 38.6 % (ref 37–48.5)
HDLC SERPL-MCNC: 36 MG/DL (ref 40–75)
HDLC SERPL: 22.9 % (ref 20–50)
HGB BLD-MCNC: 12 G/DL (ref 12–16)
IMM GRANULOCYTES # BLD AUTO: 0.02 K/UL (ref 0–0.04)
IMM GRANULOCYTES NFR BLD AUTO: 0.3 % (ref 0–0.5)
LDLC SERPL CALC-MCNC: 97.4 MG/DL (ref 63–159)
LYMPHOCYTES # BLD AUTO: 1.9 K/UL (ref 1–4.8)
LYMPHOCYTES NFR BLD: 24.3 % (ref 18–48)
MCH RBC QN AUTO: 28.6 PG (ref 27–31)
MCHC RBC AUTO-ENTMCNC: 31.1 G/DL (ref 32–36)
MCV RBC AUTO: 92 FL (ref 82–98)
MONOCYTES # BLD AUTO: 0.7 K/UL (ref 0.3–1)
MONOCYTES NFR BLD: 8.5 % (ref 4–15)
NEUTROPHILS # BLD AUTO: 5 K/UL (ref 1.8–7.7)
NEUTROPHILS NFR BLD: 65.2 % (ref 38–73)
NONHDLC SERPL-MCNC: 121 MG/DL
NRBC BLD-RTO: 0 /100 WBC
PLATELET # BLD AUTO: 267 K/UL (ref 150–450)
PMV BLD AUTO: 11.7 FL (ref 9.2–12.9)
POTASSIUM SERPL-SCNC: 3.9 MMOL/L (ref 3.5–5.1)
PROT SERPL-MCNC: 6.9 G/DL (ref 6–8.4)
RBC # BLD AUTO: 4.2 M/UL (ref 4–5.4)
SODIUM SERPL-SCNC: 137 MMOL/L (ref 136–145)
TRIGL SERPL-MCNC: 118 MG/DL (ref 30–150)
WBC # BLD AUTO: 7.64 K/UL (ref 3.9–12.7)

## 2021-06-09 PROCEDURE — 80053 COMPREHEN METABOLIC PANEL: CPT | Performed by: INTERNAL MEDICINE

## 2021-06-09 PROCEDURE — 80061 LIPID PANEL: CPT | Performed by: INTERNAL MEDICINE

## 2021-06-09 PROCEDURE — 36415 COLL VENOUS BLD VENIPUNCTURE: CPT | Mod: PO | Performed by: INTERNAL MEDICINE

## 2021-06-09 PROCEDURE — 85025 COMPLETE CBC W/AUTO DIFF WBC: CPT | Performed by: INTERNAL MEDICINE

## 2021-06-09 PROCEDURE — 83036 HEMOGLOBIN GLYCOSYLATED A1C: CPT | Performed by: INTERNAL MEDICINE

## 2021-06-10 ENCOUNTER — OFFICE VISIT (OUTPATIENT)
Dept: FAMILY MEDICINE | Facility: CLINIC | Age: 71
End: 2021-06-10
Payer: MEDICARE

## 2021-06-10 VITALS
WEIGHT: 137.13 LBS | BODY MASS INDEX: 23.41 KG/M2 | SYSTOLIC BLOOD PRESSURE: 124 MMHG | HEIGHT: 64 IN | OXYGEN SATURATION: 96 % | HEART RATE: 87 BPM | DIASTOLIC BLOOD PRESSURE: 76 MMHG

## 2021-06-10 DIAGNOSIS — R42 DIZZINESS: Primary | ICD-10-CM

## 2021-06-10 DIAGNOSIS — I10 ESSENTIAL HYPERTENSION: ICD-10-CM

## 2021-06-10 PROCEDURE — 99214 PR OFFICE/OUTPT VISIT, EST, LEVL IV, 30-39 MIN: ICD-10-PCS | Mod: S$PBB,,, | Performed by: FAMILY MEDICINE

## 2021-06-10 PROCEDURE — 99213 OFFICE O/P EST LOW 20 MIN: CPT | Mod: PBBFAC,PO | Performed by: FAMILY MEDICINE

## 2021-06-10 PROCEDURE — 99999 PR PBB SHADOW E&M-EST. PATIENT-LVL III: ICD-10-PCS | Mod: PBBFAC,,, | Performed by: FAMILY MEDICINE

## 2021-06-10 PROCEDURE — 99214 OFFICE O/P EST MOD 30 MIN: CPT | Mod: S$PBB,,, | Performed by: FAMILY MEDICINE

## 2021-06-10 PROCEDURE — 99999 PR PBB SHADOW E&M-EST. PATIENT-LVL III: CPT | Mod: PBBFAC,,, | Performed by: FAMILY MEDICINE

## 2021-06-10 RX ORDER — MECLIZINE HYDROCHLORIDE 25 MG/1
25 TABLET ORAL 3 TIMES DAILY PRN
Qty: 30 TABLET | Refills: 0 | Status: SHIPPED | OUTPATIENT
Start: 2021-06-10 | End: 2022-09-19

## 2021-06-17 ENCOUNTER — PATIENT MESSAGE (OUTPATIENT)
Dept: INTERNAL MEDICINE | Facility: CLINIC | Age: 71
End: 2021-06-17

## 2021-06-21 ENCOUNTER — OFFICE VISIT (OUTPATIENT)
Dept: INTERNAL MEDICINE | Facility: CLINIC | Age: 71
End: 2021-06-21
Payer: MEDICARE

## 2021-06-21 VITALS
DIASTOLIC BLOOD PRESSURE: 60 MMHG | HEART RATE: 93 BPM | SYSTOLIC BLOOD PRESSURE: 110 MMHG | WEIGHT: 135.81 LBS | OXYGEN SATURATION: 98 % | HEIGHT: 64 IN | BODY MASS INDEX: 23.18 KG/M2

## 2021-06-21 DIAGNOSIS — I10 ESSENTIAL HYPERTENSION: ICD-10-CM

## 2021-06-21 DIAGNOSIS — R73.03 PREDIABETES: ICD-10-CM

## 2021-06-21 DIAGNOSIS — H81.10 BENIGN PAROXYSMAL POSITIONAL VERTIGO, UNSPECIFIED LATERALITY: ICD-10-CM

## 2021-06-21 DIAGNOSIS — Z23 NEED FOR TETANUS BOOSTER: ICD-10-CM

## 2021-06-21 DIAGNOSIS — Z00.00 ANNUAL PHYSICAL EXAM: ICD-10-CM

## 2021-06-21 DIAGNOSIS — E78.5 HYPERLIPIDEMIA, UNSPECIFIED HYPERLIPIDEMIA TYPE: ICD-10-CM

## 2021-06-21 DIAGNOSIS — Z23 NEED FOR VACCINATION AGAINST STREPTOCOCCUS PNEUMONIAE: ICD-10-CM

## 2021-06-21 DIAGNOSIS — L20.9 ATOPIC DERMATITIS, UNSPECIFIED TYPE: ICD-10-CM

## 2021-06-21 PROCEDURE — 99214 PR OFFICE/OUTPT VISIT, EST, LEVL IV, 30-39 MIN: ICD-10-PCS | Mod: S$PBB,,, | Performed by: INTERNAL MEDICINE

## 2021-06-21 PROCEDURE — 99214 OFFICE O/P EST MOD 30 MIN: CPT | Mod: PBBFAC,PO | Performed by: INTERNAL MEDICINE

## 2021-06-21 PROCEDURE — 99999 PR PBB SHADOW E&M-EST. PATIENT-LVL IV: ICD-10-PCS | Mod: PBBFAC,,, | Performed by: INTERNAL MEDICINE

## 2021-06-21 PROCEDURE — 99214 OFFICE O/P EST MOD 30 MIN: CPT | Mod: S$PBB,,, | Performed by: INTERNAL MEDICINE

## 2021-06-21 PROCEDURE — 99999 PR PBB SHADOW E&M-EST. PATIENT-LVL IV: CPT | Mod: PBBFAC,,, | Performed by: INTERNAL MEDICINE

## 2021-06-21 RX ORDER — AMLODIPINE BESYLATE 2.5 MG/1
2.5 TABLET ORAL DAILY
Qty: 90 TABLET | Refills: 3 | Status: SHIPPED | OUTPATIENT
Start: 2021-06-21 | End: 2022-08-12 | Stop reason: SDUPTHER

## 2021-06-21 RX ORDER — BETAMETHASONE DIPROPIONATE 0.5 MG/G
OINTMENT TOPICAL 2 TIMES DAILY
Qty: 1 TUBE | Refills: 2 | Status: SHIPPED | OUTPATIENT
Start: 2021-06-21 | End: 2021-06-24 | Stop reason: SDUPTHER

## 2021-06-24 ENCOUNTER — OFFICE VISIT (OUTPATIENT)
Dept: DERMATOLOGY | Facility: CLINIC | Age: 71
End: 2021-06-24
Payer: MEDICARE

## 2021-06-24 DIAGNOSIS — L28.0 FRICTIONAL LICHENOID DERMATOSIS: Primary | ICD-10-CM

## 2021-06-24 DIAGNOSIS — L20.9 ATOPIC DERMATITIS, UNSPECIFIED TYPE: ICD-10-CM

## 2021-06-24 PROCEDURE — 99999 PR PBB SHADOW E&M-EST. PATIENT-LVL III: ICD-10-PCS | Mod: PBBFAC,,, | Performed by: DERMATOLOGY

## 2021-06-24 PROCEDURE — 99213 OFFICE O/P EST LOW 20 MIN: CPT | Mod: PBBFAC | Performed by: DERMATOLOGY

## 2021-06-24 PROCEDURE — 99214 OFFICE O/P EST MOD 30 MIN: CPT | Mod: S$PBB,,, | Performed by: DERMATOLOGY

## 2021-06-24 PROCEDURE — 99999 PR PBB SHADOW E&M-EST. PATIENT-LVL III: CPT | Mod: PBBFAC,,, | Performed by: DERMATOLOGY

## 2021-06-24 PROCEDURE — 99214 PR OFFICE/OUTPT VISIT, EST, LEVL IV, 30-39 MIN: ICD-10-PCS | Mod: S$PBB,,, | Performed by: DERMATOLOGY

## 2021-06-24 RX ORDER — BETAMETHASONE DIPROPIONATE 0.5 MG/G
OINTMENT TOPICAL 2 TIMES DAILY
Qty: 1 TUBE | Refills: 2 | Status: SHIPPED | OUTPATIENT
Start: 2021-06-24 | End: 2022-09-19

## 2021-06-24 RX ORDER — MOMETASONE FUROATE 1 MG/G
OINTMENT TOPICAL DAILY
Qty: 45 G | Refills: 5 | Status: SHIPPED | OUTPATIENT
Start: 2021-06-24 | End: 2022-09-19

## 2021-07-01 ENCOUNTER — PATIENT MESSAGE (OUTPATIENT)
Dept: DERMATOLOGY | Facility: CLINIC | Age: 71
End: 2021-07-01

## 2021-08-03 ENCOUNTER — PATIENT MESSAGE (OUTPATIENT)
Dept: INTERNAL MEDICINE | Facility: CLINIC | Age: 71
End: 2021-08-03

## 2021-09-18 DIAGNOSIS — E78.5 HYPERLIPIDEMIA, UNSPECIFIED HYPERLIPIDEMIA TYPE: ICD-10-CM

## 2021-09-18 DIAGNOSIS — R51.9 CHRONIC NONINTRACTABLE HEADACHE, UNSPECIFIED HEADACHE TYPE: ICD-10-CM

## 2021-09-18 DIAGNOSIS — G89.29 CHRONIC NONINTRACTABLE HEADACHE, UNSPECIFIED HEADACHE TYPE: ICD-10-CM

## 2021-09-18 RX ORDER — PROPRANOLOL HYDROCHLORIDE 20 MG/1
20 TABLET ORAL 2 TIMES DAILY
Qty: 180 TABLET | Refills: 3 | Status: SHIPPED | OUTPATIENT
Start: 2021-09-18 | End: 2022-09-19 | Stop reason: SDUPTHER

## 2021-09-18 RX ORDER — VENLAFAXINE HYDROCHLORIDE 75 MG/1
CAPSULE, EXTENDED RELEASE ORAL
Qty: 90 CAPSULE | Refills: 3 | Status: SHIPPED | OUTPATIENT
Start: 2021-09-18 | End: 2022-09-19 | Stop reason: SDUPTHER

## 2021-09-18 RX ORDER — ATORVASTATIN CALCIUM 40 MG/1
40 TABLET, FILM COATED ORAL DAILY
Qty: 90 TABLET | Refills: 3 | Status: SHIPPED | OUTPATIENT
Start: 2021-09-18 | End: 2022-09-19 | Stop reason: SDUPTHER

## 2021-09-18 RX ORDER — VENLAFAXINE HYDROCHLORIDE 150 MG/1
CAPSULE, EXTENDED RELEASE ORAL
Qty: 90 CAPSULE | Refills: 3 | Status: SHIPPED | OUTPATIENT
Start: 2021-09-18 | End: 2022-09-19 | Stop reason: SDUPTHER

## 2021-10-01 ENCOUNTER — PES CALL (OUTPATIENT)
Dept: ADMINISTRATIVE | Facility: CLINIC | Age: 71
End: 2021-10-01

## 2021-11-29 ENCOUNTER — TELEPHONE (OUTPATIENT)
Dept: FAMILY MEDICINE | Facility: CLINIC | Age: 71
End: 2021-11-29
Payer: MEDICARE

## 2021-11-29 ENCOUNTER — TELEPHONE (OUTPATIENT)
Dept: OPTOMETRY | Facility: CLINIC | Age: 71
End: 2021-11-29
Payer: MEDICARE

## 2021-12-30 ENCOUNTER — LAB VISIT (OUTPATIENT)
Dept: LAB | Facility: HOSPITAL | Age: 71
End: 2021-12-30
Attending: INTERNAL MEDICINE
Payer: MEDICARE

## 2021-12-30 DIAGNOSIS — R73.03 PREDIABETES: ICD-10-CM

## 2021-12-30 LAB
ESTIMATED AVG GLUCOSE: 117 MG/DL (ref 68–131)
HBA1C MFR BLD: 5.7 % (ref 4–5.6)

## 2021-12-30 PROCEDURE — 83036 HEMOGLOBIN GLYCOSYLATED A1C: CPT | Performed by: INTERNAL MEDICINE

## 2021-12-30 PROCEDURE — 36415 COLL VENOUS BLD VENIPUNCTURE: CPT | Mod: PO | Performed by: INTERNAL MEDICINE

## 2022-01-03 ENCOUNTER — OFFICE VISIT (OUTPATIENT)
Dept: OPTOMETRY | Facility: CLINIC | Age: 72
End: 2022-01-03
Payer: MEDICARE

## 2022-01-03 DIAGNOSIS — H25.13 NUCLEAR SCLEROSIS OF BOTH EYES: Primary | ICD-10-CM

## 2022-01-03 DIAGNOSIS — Z13.5 SCREENING FOR EYE CONDITION: ICD-10-CM

## 2022-01-03 DIAGNOSIS — H04.123 BILATERAL DRY EYES: ICD-10-CM

## 2022-01-03 PROCEDURE — 92012 INTRM OPH EXAM EST PATIENT: CPT | Mod: S$PBB,,, | Performed by: OPTOMETRIST

## 2022-01-03 PROCEDURE — 99213 OFFICE O/P EST LOW 20 MIN: CPT | Mod: PBBFAC | Performed by: OPTOMETRIST

## 2022-01-03 PROCEDURE — 99999 PR PBB SHADOW E&M-EST. PATIENT-LVL III: ICD-10-PCS | Mod: PBBFAC,,, | Performed by: OPTOMETRIST

## 2022-01-03 PROCEDURE — 92012 PR EYE EXAM, EST PATIENT,INTERMED: ICD-10-PCS | Mod: S$PBB,,, | Performed by: OPTOMETRIST

## 2022-01-03 PROCEDURE — 99999 PR PBB SHADOW E&M-EST. PATIENT-LVL III: CPT | Mod: PBBFAC,,, | Performed by: OPTOMETRIST

## 2022-01-03 NOTE — PROGRESS NOTES
"HPI     Eye Problem      Additional comments: In for follow-up visit.  Saw Dr. Donovan in June 2021 at [facility]. Was advised of cataracts in both eyes.  Report of   eye pain/discomfort then, and Dr. Donovan suggested increased use of ATs   (at least 2-4 times per day).  Using Systane ATs.  Ms. Lopez states that   both eye still bothersome.  Eyes "hurt" on instillation of drops, and for   a while thereafter. Then pain/discomfort subsides.                Comments     Patient's age: 71 y.o.  Occupation: retired   Approximate date of last eye examination:  06/07/2021  Name of last eye doctor seen:    City/State: [place]   Wears glasses? yes     If yes, wears  Full-time or part-time?  Part-time   Present glasses are: Bifocal, SV Distance, SV Reading?  Reading   Approximate age of present glasses:     Got new glasses following last exam, or subsequently?:     Any problem with [facility] with glasses?  no  Wears CLs?:  no  Headaches?  Yes   Eye pain/discomfort?  Pain OU used systane and didn't give her no relief                                                                                        Flashes?  no  Floaters?  yes  Diplopia/Double vision?  no  Patient's Ocular History:         Any eye surgeries? no         Any eye injury?  no         Any treatment for eye disease?  no  Family history of eye disease?  no  Significant patient medical history:         1. Diabetes? no       If yes, IDDM or NIDDM?    2. HBP?  Yes controlled with medication               3. Other (describe):     ! OTC eyedrops currently using:  systane    ! Prescription eye meds currently using:  no   ! Any history of allergy/adverse reaction to any eye meds used   previously?  no    ! Any history of allergy/adverse reaction to eyedrops used during prior   eye exam(s)? no    ! Any history of allergy/adverse reaction to Novacaine or similar meds?   no   ! Any history of allergy/adverse reaction to Epinephrine or similar meds? "   no    ! Patient okay with use of anesthetic eyedrops to check eye pressure?    yes        ! Patient okay with use of eyedrops to dilate pupils today?  yes   !  Allergies/Medications/Medical History/Family History reviewed today?    yes      PD =     Desired reading distance =    Approx computer distance =                                                                        Last edited by Michael Bradley, OD on 1/3/2022 10:47 AM. (History)            Assessment /Plan     For exam results, see Encounter Report.    1. Nuclear sclerosis of both eyes     2. Bilateral dry eyes     3. Screening for eye condition                      Bilateral nuclear sclerotic cataract.  Bilateral dry eye.  Rapid tear break-up-time in both eyes, and tear volume below normal range in both eyes..  Ms. Lopez has been using Systane artificial tears in both eyes. Twice per day.  Complains that both eyes hurt/burn following instillation of Aystane artificial tears, and for some time therefter.     Suggested Ms. Lopez may be sensitive to preservatives in the artificioal tears.       Suggested trial with non-preserved Systane or Refresh Optive artificial tears in both eye as often as desired throughout the day.   Return after thirty days if symptoms not satisfactorily relieved to discuss alternative dry eye treatment(s).      Otherwise, follow up with Dr. Donovan as he recommends.

## 2022-01-03 NOTE — PATIENT INSTRUCTIONS
Bilateral nuclear sclerotic cataract.  Bilateral dry eye.  Rapid tear break-up-time in both eyes, and tear volume below normal range in both eyes..  Ms. Lopez has been using Systane artificial tears in both eyes. Twice per day.  Complains that both eyes hurt/burn following instillation of Aystane artificial tears, and for some time therefter.     Suggested Ms. Lopez may be sensitive to preservatives in the artificioal tears.       Suggested trial with non-preserved Systane or Refresh Optive artificial tears in both eye as often as desired throughout the day.   Return after thirty days if symptoms not satisfactorily relieved to discuss alternative dry eye treatment(s).      Otherwise, follow up with Dr. Donovan as he recommends.

## 2022-01-04 ENCOUNTER — OFFICE VISIT (OUTPATIENT)
Dept: INTERNAL MEDICINE | Facility: CLINIC | Age: 72
End: 2022-01-04
Payer: MEDICARE

## 2022-01-04 VITALS
HEIGHT: 64 IN | WEIGHT: 128.31 LBS | BODY MASS INDEX: 21.91 KG/M2 | SYSTOLIC BLOOD PRESSURE: 116 MMHG | OXYGEN SATURATION: 99 % | DIASTOLIC BLOOD PRESSURE: 62 MMHG | HEART RATE: 85 BPM

## 2022-01-04 DIAGNOSIS — M85.80 OSTEOPENIA, UNSPECIFIED LOCATION: ICD-10-CM

## 2022-01-04 DIAGNOSIS — I10 ESSENTIAL HYPERTENSION: ICD-10-CM

## 2022-01-04 DIAGNOSIS — R73.03 PREDIABETES: ICD-10-CM

## 2022-01-04 DIAGNOSIS — Z00.00 ANNUAL PHYSICAL EXAM: ICD-10-CM

## 2022-01-04 DIAGNOSIS — Z78.0 POSTMENOPAUSAL: ICD-10-CM

## 2022-01-04 DIAGNOSIS — R79.89 ABNORMAL CBC MEASUREMENT: ICD-10-CM

## 2022-01-04 DIAGNOSIS — Z23 NEED FOR TETANUS BOOSTER: ICD-10-CM

## 2022-01-04 DIAGNOSIS — E78.5 HYPERLIPIDEMIA, UNSPECIFIED HYPERLIPIDEMIA TYPE: ICD-10-CM

## 2022-01-04 DIAGNOSIS — Z23 NEED FOR VACCINATION AGAINST STREPTOCOCCUS PNEUMONIAE: ICD-10-CM

## 2022-01-04 PROCEDURE — 99999 PR PBB SHADOW E&M-EST. PATIENT-LVL IV: CPT | Mod: PBBFAC,,, | Performed by: INTERNAL MEDICINE

## 2022-01-04 PROCEDURE — 99214 OFFICE O/P EST MOD 30 MIN: CPT | Mod: S$PBB,,, | Performed by: INTERNAL MEDICINE

## 2022-01-04 PROCEDURE — 99214 PR OFFICE/OUTPT VISIT, EST, LEVL IV, 30-39 MIN: ICD-10-PCS | Mod: S$PBB,,, | Performed by: INTERNAL MEDICINE

## 2022-01-04 PROCEDURE — 99999 PR PBB SHADOW E&M-EST. PATIENT-LVL IV: ICD-10-PCS | Mod: PBBFAC,,, | Performed by: INTERNAL MEDICINE

## 2022-01-04 PROCEDURE — 99214 OFFICE O/P EST MOD 30 MIN: CPT | Mod: PBBFAC,PO,25 | Performed by: INTERNAL MEDICINE

## 2022-01-04 PROCEDURE — G0009 ADMIN PNEUMOCOCCAL VACCINE: HCPCS | Mod: PBBFAC,PO

## 2022-01-04 NOTE — PROGRESS NOTES
Patient ID: Maddy Lopez is a 71 y.o. female.    Chief Complaint: f/u prediabetes    HPI Maddy is a 71 y.o. female with prediabetes, HTN, HLD and eczema who presents for routine follow-up of medical conditions.  She has no acute complaints or concerns today.  Of note, she has been exercising, improved her diet, and has lost weight.  We reviewed her recent A1c lab in clinic together today.  A1c has improved and is now 5.7.  Blood pressure is well controlled in clinic today.  We reviewed last DXA scan which shows osteopenia. She is due for repeat DXA.    Health Maintenance Topics with due status: Not Due       Topic Last Completion Date    Colorectal Cancer Screening 10/24/2018    Lipid Panel 06/09/2021    Mammogram 10/18/2021     Review of Systems   All other systems reviewed and are negative.      Objective:     Vitals:    01/04/22 0759   BP: 116/62   Pulse: 85        Physical Exam  Vitals reviewed.   Constitutional:       General: She is not in acute distress.     Appearance: Normal appearance. She is well-developed. She is not ill-appearing, toxic-appearing or diaphoretic.   HENT:      Head: Normocephalic and atraumatic.      Right Ear: External ear normal.      Left Ear: External ear normal.      Nose: Nose normal.   Eyes:      General: No scleral icterus.        Right eye: No discharge.         Left eye: No discharge.      Extraocular Movements: Extraocular movements intact.      Conjunctiva/sclera: Conjunctivae normal.   Cardiovascular:      Rate and Rhythm: Normal rate and regular rhythm.      Heart sounds: Normal heart sounds. No murmur heard.  No friction rub. No gallop.    Pulmonary:      Effort: Pulmonary effort is normal. No respiratory distress.      Breath sounds: Normal breath sounds. No stridor. No wheezing, rhonchi or rales.   Skin:     General: Skin is warm and dry.   Neurological:      General: No focal deficit present.      Mental Status: She is alert and oriented to person, place, and  time. Mental status is at baseline.   Psychiatric:         Mood and Affect: Mood normal.         Behavior: Behavior normal.         Thought Content: Thought content normal.         Judgment: Judgment normal.         Assessment:       1. Essential hypertension Well controlled   2. Osteopenia, unspecified location Chronic   3. Postmenopausal Chronic   4. Hyperlipidemia, unspecified hyperlipidemia type Chronic   5. Prediabetes Chronic   6. Annual physical exam    7. Need for vaccination against Streptococcus pneumoniae    8. Need for tetanus booster    9. Abnormal CBC measurement        Plan:         Essential hypertension  Comments:  Continue current medication  Orders:  -     Comprehensive Metabolic Panel; Future; Expected date: 07/01/2022    Osteopenia, unspecified location    Postmenopausal  -     DXA Bone Density Spine And Hip; Future; Expected date: 01/04/2022    Hyperlipidemia, unspecified hyperlipidemia type  -     Lipid Panel; Future; Expected date: 07/01/2022    Prediabetes  Comments:  Improved. Continue healthy lifestyle  Orders:  -     Hemoglobin A1C; Future; Expected date: 07/01/2022    Annual physical exam    Need for vaccination against Streptococcus pneumoniae  -     (In Office Administered) Pneumococcal Polysaccharide Vaccine (23 Valent) (SQ/IM)    Need for tetanus booster    Abnormal CBC measurement  -     CBC Auto Differential; Future; Expected date: 07/01/2022        RTC 6 months for annual exam    Warning signs discussed, patient to call with any further issues or worsening of symptoms.       Parts of the above note were dictated using a voice dictation software. Please excuse any grammatical or typographical errors.

## 2022-01-07 ENCOUNTER — PATIENT MESSAGE (OUTPATIENT)
Dept: INTERNAL MEDICINE | Facility: CLINIC | Age: 72
End: 2022-01-07
Payer: MEDICARE

## 2022-01-24 ENCOUNTER — OFFICE VISIT (OUTPATIENT)
Dept: DERMATOLOGY | Facility: CLINIC | Age: 72
End: 2022-01-24
Payer: MEDICARE

## 2022-01-24 DIAGNOSIS — L28.0 FRICTIONAL LICHENOID DERMATOSIS: Primary | ICD-10-CM

## 2022-01-24 PROCEDURE — 99213 PR OFFICE/OUTPT VISIT, EST, LEVL III, 20-29 MIN: ICD-10-PCS | Mod: 25,S$PBB,, | Performed by: DERMATOLOGY

## 2022-01-24 PROCEDURE — 99213 OFFICE O/P EST LOW 20 MIN: CPT | Mod: PBBFAC | Performed by: DERMATOLOGY

## 2022-01-24 PROCEDURE — 99999 PR PBB SHADOW E&M-EST. PATIENT-LVL III: CPT | Mod: PBBFAC,,, | Performed by: DERMATOLOGY

## 2022-01-24 PROCEDURE — 11900 INJECT SKIN LESIONS </W 7: CPT | Mod: PBBFAC | Performed by: DERMATOLOGY

## 2022-01-24 PROCEDURE — 99999 PR PBB SHADOW E&M-EST. PATIENT-LVL III: ICD-10-PCS | Mod: PBBFAC,,, | Performed by: DERMATOLOGY

## 2022-01-24 PROCEDURE — 11900 PR INJECTION INTO SKIN LESIONS, UP TO 7: ICD-10-PCS | Mod: S$PBB,,, | Performed by: DERMATOLOGY

## 2022-01-24 PROCEDURE — 99213 OFFICE O/P EST LOW 20 MIN: CPT | Mod: 25,S$PBB,, | Performed by: DERMATOLOGY

## 2022-01-24 NOTE — PROGRESS NOTES
Subjective:       Patient ID:  Maddy Lopez is a 71 y.o. 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 was using medicated SA cleanser (d/c'ed 2 months ago)  Signs / symptoms: asymptomatic        Review of Systems   Constitutional: Negative for fever, chills and fatigue.   Musculoskeletal: Negative for arthralgias.   Skin: Positive for rash and dry skin. Negative for itching.   Hematologic/Lymphatic: Does not bruise/bleed easily.        Objective:    Physical Exam   Constitutional: She appears well-developed and well-nourished. No distress.   Neurological: She is alert and oriented to person, place, and time. She is not disoriented.   Psychiatric: She has a normal mood and affect.   Skin:   Areas Examined (abnormalities noted in diagram):   RUE Inspected  LUE Inspection Performed              Diagram Legend     Erythematous scaling macule/papule c/w actinic keratosis       Vascular papule c/w angioma      Pigmented verrucoid papule/plaque c/w seborrheic keratosis      Yellow umbilicated papule c/w sebaceous hyperplasia      Irregularly shaped tan macule c/w lentigo     1-2 mm smooth white papules consistent with Milia      Movable subcutaneous cyst with punctum c/w epidermal inclusion cyst      Subcutaneous movable cyst c/w pilar cyst      Firm pink to brown papule c/w dermatofibroma      Pedunculated fleshy papule(s) c/w skin tag(s)      Evenly pigmented macule c/w junctional nevus     Mildly variegated pigmented, slightly irregular-bordered macule c/w mildly atypical nevus      Flesh colored to evenly pigmented papule c/w intradermal nevus       Pink pearly papule/plaque c/w basal cell carcinoma      Erythematous hyperkeratotic cursted plaque c/w SCC      Surgical scar with no sign of skin cancer recurrence       Open and closed comedones      Inflammatory papules and pustules      Verrucoid papule consistent consistent with wart     Erythematous eczematous patches and plaques     Dystrophic onycholytic nail with subungual debris c/w onychomycosis     Umbilicated papule    Erythematous-base heme-crusted tan verrucoid plaque consistent with inflamed seborrheic keratosis     Erythematous Silvery Scaling Plaque c/w Psoriasis     See annotation      Assessment / Plan:        Frictional lichenoid dermatosis  No longer itchy but when 1st started 2 - 3 years ago, pt used to scratch till it bled  -     triamcinolone acetonide injection 10 mg  -     MI [id number] ACETONIDE INJ PER 10MG  -     MI INJECTION INTO SKIN LESIONS, UP TO 7    Intralesional Kenalog 10mg/cc (1.0 cc total) injected into 2 lesions on the bilateral elbows today after obtaining verbal consent including risk of surrounding hypopigmentation. Patient tolerated procedure well.  Units: 1  NDC for Kenalog 10mg/cc:  3034-0142-22  Ok to cont beta dip qam           No follow-ups on file.

## 2022-02-11 ENCOUNTER — HOSPITAL ENCOUNTER (OUTPATIENT)
Dept: RADIOLOGY | Facility: HOSPITAL | Age: 72
Discharge: HOME | End: 2022-02-11
Attending: INTERNAL MEDICINE
Payer: MEDICARE

## 2022-02-11 DIAGNOSIS — Z78.0 POSTMENOPAUSAL: ICD-10-CM

## 2022-02-11 PROCEDURE — 77080 DEXA BONE DENSITY SPINE HIP: ICD-10-PCS | Mod: 26,,, | Performed by: RADIOLOGY

## 2022-02-11 PROCEDURE — 77080 DXA BONE DENSITY AXIAL: CPT | Mod: 26,,, | Performed by: RADIOLOGY

## 2022-02-11 PROCEDURE — 77080 DXA BONE DENSITY AXIAL: CPT | Mod: TC

## 2022-02-14 ENCOUNTER — PATIENT MESSAGE (OUTPATIENT)
Dept: OPTOMETRY | Facility: CLINIC | Age: 72
End: 2022-02-14
Payer: MEDICARE

## 2022-02-24 ENCOUNTER — PATIENT MESSAGE (OUTPATIENT)
Dept: OPHTHALMOLOGY | Facility: CLINIC | Age: 72
End: 2022-02-24
Payer: MEDICARE

## 2022-02-24 ENCOUNTER — PATIENT MESSAGE (OUTPATIENT)
Dept: OPTOMETRY | Facility: CLINIC | Age: 72
End: 2022-02-24

## 2022-02-24 ENCOUNTER — OFFICE VISIT (OUTPATIENT)
Dept: OPTOMETRY | Facility: CLINIC | Age: 72
End: 2022-02-24
Payer: MEDICARE

## 2022-02-24 DIAGNOSIS — H04.123 BILATERAL DRY EYES: Primary | ICD-10-CM

## 2022-02-24 DIAGNOSIS — H57.13 EYE PAIN, BILATERAL: ICD-10-CM

## 2022-02-24 DIAGNOSIS — H02.401 PTOSIS OF RIGHT EYELID: ICD-10-CM

## 2022-02-24 PROCEDURE — 99999 PR PBB SHADOW E&M-EST. PATIENT-LVL III: CPT | Mod: PBBFAC,,, | Performed by: OPTOMETRIST

## 2022-02-24 PROCEDURE — 92012 INTRM OPH EXAM EST PATIENT: CPT | Mod: S$PBB,,, | Performed by: OPTOMETRIST

## 2022-02-24 PROCEDURE — 99999 PR PBB SHADOW E&M-EST. PATIENT-LVL III: ICD-10-PCS | Mod: PBBFAC,,, | Performed by: OPTOMETRIST

## 2022-02-24 PROCEDURE — 99213 OFFICE O/P EST LOW 20 MIN: CPT | Mod: PBBFAC | Performed by: OPTOMETRIST

## 2022-02-24 PROCEDURE — 92012 PR EYE EXAM, EST PATIENT,INTERMED: ICD-10-PCS | Mod: S$PBB,,, | Performed by: OPTOMETRIST

## 2022-02-24 RX ORDER — BETAMETHASONE DIPROPIONATE 0.5 MG/G
CREAM TOPICAL
COMMUNITY
Start: 2021-06-24 | End: 2022-09-19

## 2022-02-24 RX ORDER — ZOSTER VACCINE RECOMBINANT, ADJUVANTED 50 MCG/0.5
KIT INTRAMUSCULAR
COMMUNITY
Start: 2021-06-29 | End: 2022-09-19

## 2022-02-24 RX ORDER — CYCLOSPORINE 0.5 MG/ML
1 EMULSION OPHTHALMIC 2 TIMES DAILY
Qty: 180 EACH | Refills: 1 | Status: SHIPPED | OUTPATIENT
Start: 2022-02-24 | End: 2022-09-19

## 2022-02-24 NOTE — PROGRESS NOTES
HPI     Patient's age: 71 y.o.  Occupation: retired   Approximate date of last eye examination:  01/03/2022  Name of last eye doctor seen:    City/State: [place]   Wears glasses? yes     If yes, wears  Full-time or part-time?  Part-time   Present glasses are: Bifocal, SV Distance, SV Reading?  Reading OTC   readers   Approximate age of present glasses:     Got new glasses following last exam, or subsequently?:     Any problem with [facility] with glasses?  no  Wears CLs?:  no  Headaches?  no   Eye pain/discomfort?  Pain OU                                                                                      Flashes?  no  Floaters?  yes  Diplopia/Double vision?  no  Patient's Ocular History:         Any eye surgeries? no         Any eye injury?  no         Any treatment for eye disease?  no  Family history of eye disease?  no  Significant patient medical history:         1. Diabetes? no       If yes, IDDM or NIDDM?    2. HBP?  Yes controlled with medication               3. Other (describe):     ! OTC eyedrops currently using:  systane    ! Prescription eye meds currently using:  no   ! Any history of allergy/adverse reaction to any eye meds used   previously?  no    ! Any history of allergy/adverse reaction to eyedrops used during prior   eye exam(s)? no    ! Any history of allergy/adverse reaction to Novacaine or similar meds?   no   ! Any history of allergy/adverse reaction to Epinephrine or similar meds?   no    ! Patient okay with use of anesthetic eyedrops to check eye pressure?    yes        ! Patient okay with use of eyedrops to dilate pupils today?  yes   !  Allergies/Medications/Medical History/Family History reviewed today?    yes      PD =     Desired reading distance =    Approx computer distance =                                                                  Last edited by Nick Hickman MA on 2/24/2022  8:40 AM. (History)            Assessment /Plan     For exam results, see Encounter  Report.    1. Bilateral dry eyes  cycloSPORINE (RESTASIS) 0.05 % ophthalmic emulsion   2. Eye pain, bilateral     3. Ptosis of right eyelid                      Ms. Lopez returns today with signs/symptoms of bilateral dry eye, not relieved sufficiently with regular use of non-preserved artificial tears.  Discussed alternative dry eye treatment(s).  Defer decision to insert punctal plugs.  Prescribed Restasis Ophthalmic emulsion for instillation into both eyes two times per day.  Suggest continue artificial tears, in addition to Restasis, as needed throughout the day.  Return after six weeks if she still finds that dry eye symptoms are not sufficiently relieved.

## 2022-02-24 NOTE — PATIENT INSTRUCTIONS
Ms. Lopez returns today with signs/symptoms of bilateral dry eye, not relieved sufficiently with regular use of non-preserved artificial tears.  Discussed alternative dry eye treatment(s).  Defer decision to insert punctal plugs.  Prescribed Restasis Ophthalmic emulsion for instillation into both eyes two times per day.  Suggest continue artificial tears, in addition to Restasis, as needed throughout the day.  Return after six weeks if she still finds that dry eye symptoms are not sufficiently relieved.

## 2022-03-09 ENCOUNTER — TELEPHONE (OUTPATIENT)
Dept: OPTOMETRY | Facility: CLINIC | Age: 72
End: 2022-03-09
Payer: MEDICARE

## 2022-03-09 NOTE — TELEPHONE ENCOUNTER
----- Message from Liang Lambert sent at 3/9/2022  9:44 AM CST -----  Contact: pt.  .Type:  Sooner Apoointment Request    Caller is requesting a sooner appointment.  Caller declined first available appointment listed below.  Caller will not accept being placed on the waitlist and is requesting a message be sent to doctor.  Name of Caller:pt  When is the first available appointment?07/11/22  Symptoms:annual  Would the patient rather a call back or a response via [facility]? Call back  Best Call Back Number:[phone]  Additional Information: Pt. Is requesting to have her annual exam in June.

## 2022-03-15 ENCOUNTER — PES CALL (OUTPATIENT)
Dept: ADMINISTRATIVE | Facility: CLINIC | Age: 72
End: 2022-03-15
Payer: MEDICARE

## 2022-03-17 ENCOUNTER — PATIENT MESSAGE (OUTPATIENT)
Dept: INTERNAL MEDICINE | Facility: CLINIC | Age: 72
End: 2022-03-17
Payer: MEDICARE

## 2022-03-17 DIAGNOSIS — G43.909 MIGRAINE SYNDROME: ICD-10-CM

## 2022-03-17 NOTE — TELEPHONE ENCOUNTER
Care Due:                  Date            Visit Type   Department     Provider  --------------------------------------------------------------------------------                                EP -                              PRIMARY      [facility] INTERNAL  Last Visit: 01-      CARE ([facility])   CLIFF Boyce                              [facility]                              PRIMARY      [facility] INTERNAL  Next Visit: 07-      CARE ([facility])   CLIFF Boyce                                                            Last  Test          Frequency    Reason                     Performed    Due Date  --------------------------------------------------------------------------------    CMP.........  12 months..  atorvastatin, venlafaxine  06- 06-    Lipid Panel.  12 months..  atorvastatin.............  06- 06-    Powered by [employer]. Reference number: [id number].   3/17/2022 4:06:29 PM CDT

## 2022-03-18 RX ORDER — FROVATRIPTAN SUCCINATE 2.5 MG/1
2.5 TABLET, FILM COATED ORAL
Qty: 36 TABLET | Refills: 1 | Status: SHIPPED | OUTPATIENT
Start: 2022-03-18 | End: 2022-08-15 | Stop reason: SDUPTHER

## 2022-06-14 ENCOUNTER — OFFICE VISIT (OUTPATIENT)
Dept: OPTOMETRY | Facility: CLINIC | Age: 72
End: 2022-06-14
Payer: MEDICARE

## 2022-06-14 DIAGNOSIS — H04.123 BILATERAL DRY EYES: Primary | ICD-10-CM

## 2022-06-14 DIAGNOSIS — H57.13 EYE PAIN, BILATERAL: ICD-10-CM

## 2022-06-14 PROCEDURE — 99999 PR PBB SHADOW E&M-EST. PATIENT-LVL III: CPT | Mod: PBBFAC,,, | Performed by: OPTOMETRIST

## 2022-06-14 PROCEDURE — 92012 INTRM OPH EXAM EST PATIENT: CPT | Mod: S$PBB,,, | Performed by: OPTOMETRIST

## 2022-06-14 PROCEDURE — 99213 OFFICE O/P EST LOW 20 MIN: CPT | Mod: PBBFAC | Performed by: OPTOMETRIST

## 2022-06-14 PROCEDURE — 99999 PR PBB SHADOW E&M-EST. PATIENT-LVL III: ICD-10-PCS | Mod: PBBFAC,,, | Performed by: OPTOMETRIST

## 2022-06-14 PROCEDURE — 92012 PR EYE EXAM, EST PATIENT,INTERMED: ICD-10-PCS | Mod: S$PBB,,, | Performed by: OPTOMETRIST

## 2022-06-14 RX ORDER — FLUOROMETHOLONE 1 MG/ML
1 SUSPENSION/ DROPS OPHTHALMIC 4 TIMES DAILY
Qty: 5 ML | Refills: 1 | Status: SHIPPED | OUTPATIENT
Start: 2022-06-14 | End: 2022-06-28

## 2022-06-14 NOTE — PROGRESS NOTES
HPI     70 Y/o female is here with C/o pt states that her eye's are hurting pt is   having dry eye issues was told by Dr. Bradley to use non-preserved   artificial tear and Restasis but pt states that the drops are not working   she is still having pain in OU   Pt states the she see's floaters everyday     Eye med: Restasis OU BID     Last edited by Brock Donovan, OD on 6/14/2022 10:55 AM. (History)            Assessment /Plan     For exam results, see Encounter Report.    Bilateral dry eyes  -     fluorometholone 0.1% (FML) 0.1 % DrpS; Place 1 drop into both eyes 4 (four) times daily. for 14 days  Dispense: 5 mL; Refill: 1    Eye pain, bilateral      1,2. Start FML drops 4x/day x 2 weeks, if better can restart Restasis 2x/day and use systane as needed, if not better will call back and can consider pred forte or longer dose of fml

## 2022-06-30 ENCOUNTER — PATIENT MESSAGE (OUTPATIENT)
Dept: OPTOMETRY | Facility: CLINIC | Age: 72
End: 2022-06-30
Payer: MEDICARE

## 2022-07-01 ENCOUNTER — PATIENT MESSAGE (OUTPATIENT)
Dept: OPTOMETRY | Facility: CLINIC | Age: 72
End: 2022-07-01
Payer: MEDICARE

## 2022-07-11 ENCOUNTER — PATIENT MESSAGE (OUTPATIENT)
Dept: OPTOMETRY | Facility: CLINIC | Age: 72
End: 2022-07-11
Payer: MEDICARE

## 2022-07-12 ENCOUNTER — PATIENT MESSAGE (OUTPATIENT)
Dept: OPTOMETRY | Facility: CLINIC | Age: 72
End: 2022-07-12
Payer: MEDICARE

## 2022-07-30 NOTE — LETTER
October 15, 2018      Juaquin Alves MD  [address] [facility] [place] [address]           [place] [facility]  [address] [facility]  [place] [address]  Phone: [phone]  Fax: [phone]          Patient: Maddy Lopez   MR Number: [id number]   YOB: 1950   Date of Visit: 10/15/2018       Dear Dr. Juaquin Alves:    Thank you for referring Maddy Lopez to me for evaluation. Attached you will find relevant portions of my assessment and plan of care.    If you have questions, please do not hesitate to call me. I look forward to following Maddy Lopez along with you.    Sincerely,    Brock Donovan, OD    Enclosure  CC:  No Recipients    If you would like to receive this communication electronically, please contact [email or web address] or [phone] to request more information on [employer] access.    For providers and/or their staff who would like to refer a patient to [facility], please contact us through our one-stop-shop provider referral line, [facility], at [phone].    If you feel you have received this communication in error or would no longer like to receive these types of communications, please e-mail [email or web address]          6

## 2022-08-02 ENCOUNTER — TELEPHONE (OUTPATIENT)
Dept: NEUROSURGERY | Facility: CLINIC | Age: 72
End: 2022-08-02
Payer: MEDICARE

## 2022-08-02 NOTE — TELEPHONE ENCOUNTER
----- Message from Kelly Gallagher sent at 8/2/2022  9:17 AM CDT -----  Type:  Needs Medical Advice    Who Called: pt  Symptoms (please be specific): pt is having leg and hip issues and pt has been seen by Dr Lerner several years ago and she would like to be seen again     Would the patient rather a call back or a response via [facility]? call  Best Call Back Number:[phone]   Additional Information:

## 2022-08-12 ENCOUNTER — PATIENT MESSAGE (OUTPATIENT)
Dept: INTERNAL MEDICINE | Facility: CLINIC | Age: 72
End: 2022-08-12
Payer: MEDICARE

## 2022-08-12 DIAGNOSIS — I10 ESSENTIAL HYPERTENSION: ICD-10-CM

## 2022-08-15 RX ORDER — AMLODIPINE BESYLATE 2.5 MG/1
2.5 TABLET ORAL DAILY
Qty: 90 TABLET | Refills: 3 | Status: SHIPPED | OUTPATIENT
Start: 2022-08-15 | End: 2022-09-19

## 2022-08-16 DIAGNOSIS — G43.909 MIGRAINE SYNDROME: ICD-10-CM

## 2022-08-16 NOTE — TELEPHONE ENCOUNTER
No new care gaps identified.  [facility] Embedded Care Gaps. Reference number: [id number]. 8/16/2022   4:34:07 PM CDT

## 2022-08-16 NOTE — TELEPHONE ENCOUNTER
----- Message from Stephanie Boyce MD sent at 8/16/2022  3:22 PM CDT -----  Rx request please. Thanks    ----- Message -----  From: Georgina Warren  Sent: 8/16/2022   1:18 PM CDT  To: Carli [facility] Staff    Type:  Needs Medical Advice    Who Called: self  Reason:frovatriptan (FROVA) 2.5 MG tablet was sent to the wrong pharmacy   Would the patient rather a call back or a response via [facility]? call  Best Call Back Number:  [phone]  Additional Information:[facility] - [place], [place] - [address] [place] [address]   Phone:  [phone]  Fax:  [phone]

## 2022-08-17 RX ORDER — FROVATRIPTAN SUCCINATE 2.5 MG/1
2.5 TABLET, FILM COATED ORAL
Qty: 36 TABLET | Refills: 1 | Status: SHIPPED | OUTPATIENT
Start: 2022-08-17 | End: 2023-07-25 | Stop reason: SDUPTHER

## 2022-08-26 ENCOUNTER — TELEPHONE (OUTPATIENT)
Dept: INTERNAL MEDICINE | Facility: CLINIC | Age: 72
End: 2022-08-26
Payer: MEDICARE

## 2022-08-26 DIAGNOSIS — Z12.31 SCREENING MAMMOGRAM FOR BREAST CANCER: Primary | ICD-10-CM

## 2022-08-26 NOTE — TELEPHONE ENCOUNTER
----- Message from Ashlee Mosher sent at 8/26/2022  9:03 AM CDT -----  Contact: pt  .Type:  Mammogram    Caller is requesting to schedule their annual mammogram appointment.  Order is not listed in EPIC.  Please enter order and contact patient to schedule.  Name of Call Maddy  Where would they like the mammogram performed? [facility]  Would the patient rather a call back or a response via [facility]? Call  Best Call Back Number:[phone]  Additional Information:

## 2022-09-15 ENCOUNTER — PATIENT MESSAGE (OUTPATIENT)
Dept: INTERNAL MEDICINE | Facility: CLINIC | Age: 72
End: 2022-09-15
Payer: MEDICARE

## 2022-09-15 ENCOUNTER — LAB VISIT (OUTPATIENT)
Dept: LAB | Facility: HOSPITAL | Age: 72
End: 2022-09-15
Attending: INTERNAL MEDICINE
Payer: MEDICARE

## 2022-09-15 DIAGNOSIS — I10 ESSENTIAL HYPERTENSION: ICD-10-CM

## 2022-09-15 DIAGNOSIS — E78.5 HYPERLIPIDEMIA, UNSPECIFIED HYPERLIPIDEMIA TYPE: ICD-10-CM

## 2022-09-15 DIAGNOSIS — R79.89 ABNORMAL CBC MEASUREMENT: ICD-10-CM

## 2022-09-15 DIAGNOSIS — R73.03 PREDIABETES: ICD-10-CM

## 2022-09-15 LAB
ALBUMIN SERPL BCP-MCNC: 4 G/DL (ref 3.5–5.2)
ALP SERPL-CCNC: 98 U/L (ref 55–135)
ALT SERPL W/O P-5'-P-CCNC: 20 U/L (ref 10–44)
ANION GAP SERPL CALC-SCNC: 7 MMOL/L (ref 8–16)
AST SERPL-CCNC: 17 U/L (ref 10–40)
BASOPHILS # BLD AUTO: 0.04 K/UL (ref 0–0.2)
BASOPHILS NFR BLD: 0.7 % (ref 0–1.9)
BILIRUB SERPL-MCNC: 0.6 MG/DL (ref 0.1–1)
BUN SERPL-MCNC: 10 MG/DL (ref 8–23)
CALCIUM SERPL-MCNC: 9.3 MG/DL (ref 8.7–10.5)
CHLORIDE SERPL-SCNC: 108 MMOL/L (ref 95–110)
CHOLEST SERPL-MCNC: 177 MG/DL (ref 120–199)
CHOLEST/HDLC SERPL: 4.8 {RATIO} (ref 2–5)
CO2 SERPL-SCNC: 28 MMOL/L (ref 23–29)
CREAT SERPL-MCNC: 1 MG/DL (ref 0.5–1.4)
DIFFERENTIAL METHOD: ABNORMAL
EOSINOPHIL # BLD AUTO: 0.2 K/UL (ref 0–0.5)
EOSINOPHIL NFR BLD: 2.9 % (ref 0–8)
ERYTHROCYTE [DISTWIDTH] IN BLOOD BY AUTOMATED COUNT: 14.6 % (ref 11.5–14.5)
EST. GFR  (NO RACE VARIABLE): >60 ML/MIN/1.73 M^2
ESTIMATED AVG GLUCOSE: 128 MG/DL (ref 68–131)
GLUCOSE SERPL-MCNC: 118 MG/DL (ref 70–110)
HBA1C MFR BLD: 6.1 % (ref 4–5.6)
HCT VFR BLD AUTO: 37.9 % (ref 37–48.5)
HDLC SERPL-MCNC: 37 MG/DL (ref 40–75)
HDLC SERPL: 20.9 % (ref 20–50)
HGB BLD-MCNC: 12.5 G/DL (ref 12–16)
IMM GRANULOCYTES # BLD AUTO: 0.02 K/UL (ref 0–0.04)
IMM GRANULOCYTES NFR BLD AUTO: 0.3 % (ref 0–0.5)
LDLC SERPL CALC-MCNC: 118.2 MG/DL (ref 63–159)
LYMPHOCYTES # BLD AUTO: 1.5 K/UL (ref 1–4.8)
LYMPHOCYTES NFR BLD: 25.7 % (ref 18–48)
MCH RBC QN AUTO: 29.6 PG (ref 27–31)
MCHC RBC AUTO-ENTMCNC: 33 G/DL (ref 32–36)
MCV RBC AUTO: 90 FL (ref 82–98)
MONOCYTES # BLD AUTO: 0.5 K/UL (ref 0.3–1)
MONOCYTES NFR BLD: 8.4 % (ref 4–15)
NEUTROPHILS # BLD AUTO: 3.7 K/UL (ref 1.8–7.7)
NEUTROPHILS NFR BLD: 62 % (ref 38–73)
NONHDLC SERPL-MCNC: 140 MG/DL
NRBC BLD-RTO: 0 /100 WBC
PLATELET # BLD AUTO: 252 K/UL (ref 150–450)
PMV BLD AUTO: 11.8 FL (ref 9.2–12.9)
POTASSIUM SERPL-SCNC: 4.3 MMOL/L (ref 3.5–5.1)
PROT SERPL-MCNC: 6.4 G/DL (ref 6–8.4)
RBC # BLD AUTO: 4.22 M/UL (ref 4–5.4)
SODIUM SERPL-SCNC: 143 MMOL/L (ref 136–145)
TRIGL SERPL-MCNC: 109 MG/DL (ref 30–150)
WBC # BLD AUTO: 5.92 K/UL (ref 3.9–12.7)

## 2022-09-15 PROCEDURE — 80061 LIPID PANEL: CPT | Performed by: INTERNAL MEDICINE

## 2022-09-15 PROCEDURE — 36415 COLL VENOUS BLD VENIPUNCTURE: CPT | Mod: PO | Performed by: INTERNAL MEDICINE

## 2022-09-15 PROCEDURE — 83036 HEMOGLOBIN GLYCOSYLATED A1C: CPT | Performed by: INTERNAL MEDICINE

## 2022-09-15 PROCEDURE — 80053 COMPREHEN METABOLIC PANEL: CPT | Performed by: INTERNAL MEDICINE

## 2022-09-15 PROCEDURE — 85025 COMPLETE CBC W/AUTO DIFF WBC: CPT | Performed by: INTERNAL MEDICINE

## 2022-09-16 ENCOUNTER — TELEPHONE (OUTPATIENT)
Dept: FAMILY MEDICINE | Facility: CLINIC | Age: 72
End: 2022-09-16

## 2022-09-16 ENCOUNTER — TELEPHONE (OUTPATIENT)
Dept: FAMILY MEDICINE | Facility: CLINIC | Age: 72
End: 2022-09-16
Payer: MEDICARE

## 2022-09-16 DIAGNOSIS — I10 ESSENTIAL HYPERTENSION: Primary | ICD-10-CM

## 2022-09-19 ENCOUNTER — OFFICE VISIT (OUTPATIENT)
Dept: INTERNAL MEDICINE | Facility: CLINIC | Age: 72
End: 2022-09-19
Payer: MEDICARE

## 2022-09-19 ENCOUNTER — HOSPITAL ENCOUNTER (OUTPATIENT)
Dept: RADIOLOGY | Facility: HOSPITAL | Age: 72
Discharge: HOME | End: 2022-09-19
Attending: INTERNAL MEDICINE
Payer: MEDICARE

## 2022-09-19 VITALS
DIASTOLIC BLOOD PRESSURE: 64 MMHG | OXYGEN SATURATION: 99 % | WEIGHT: 139.75 LBS | BODY MASS INDEX: 23.86 KG/M2 | HEART RATE: 74 BPM | HEIGHT: 64 IN | SYSTOLIC BLOOD PRESSURE: 98 MMHG

## 2022-09-19 DIAGNOSIS — G89.29 CHRONIC NONINTRACTABLE HEADACHE, UNSPECIFIED HEADACHE TYPE: ICD-10-CM

## 2022-09-19 DIAGNOSIS — M54.32 BILATERAL SCIATICA: ICD-10-CM

## 2022-09-19 DIAGNOSIS — M25.552 BILATERAL HIP PAIN: ICD-10-CM

## 2022-09-19 DIAGNOSIS — E78.5 HYPERLIPIDEMIA, UNSPECIFIED HYPERLIPIDEMIA TYPE: ICD-10-CM

## 2022-09-19 DIAGNOSIS — Z00.00 ANNUAL PHYSICAL EXAM: ICD-10-CM

## 2022-09-19 DIAGNOSIS — M25.551 BILATERAL HIP PAIN: ICD-10-CM

## 2022-09-19 DIAGNOSIS — R73.03 PREDIABETES: ICD-10-CM

## 2022-09-19 DIAGNOSIS — M79.644 THUMB PAIN, RIGHT: ICD-10-CM

## 2022-09-19 DIAGNOSIS — M54.31 BILATERAL SCIATICA: ICD-10-CM

## 2022-09-19 DIAGNOSIS — I10 ESSENTIAL HYPERTENSION: ICD-10-CM

## 2022-09-19 DIAGNOSIS — R51.9 CHRONIC NONINTRACTABLE HEADACHE, UNSPECIFIED HEADACHE TYPE: ICD-10-CM

## 2022-09-19 PROCEDURE — 99214 PR OFFICE/OUTPT VISIT, EST, LEVL IV, 30-39 MIN: ICD-10-PCS | Mod: S$PBB,,, | Performed by: INTERNAL MEDICINE

## 2022-09-19 PROCEDURE — 99999 PR PBB SHADOW E&M-EST. PATIENT-LVL IV: CPT | Mod: PBBFAC,,, | Performed by: INTERNAL MEDICINE

## 2022-09-19 PROCEDURE — 99214 OFFICE O/P EST MOD 30 MIN: CPT | Mod: S$PBB,,, | Performed by: INTERNAL MEDICINE

## 2022-09-19 PROCEDURE — 99999 PR PBB SHADOW E&M-EST. PATIENT-LVL IV: ICD-10-PCS | Mod: PBBFAC,,, | Performed by: INTERNAL MEDICINE

## 2022-09-19 PROCEDURE — 72110 X-RAY EXAM L-2 SPINE 4/>VWS: CPT | Mod: TC,FY,PO

## 2022-09-19 PROCEDURE — 73522 XR HIP 3 OR 4 VIEWS BILATERAL: ICD-10-PCS | Mod: 26,,, | Performed by: RADIOLOGY

## 2022-09-19 PROCEDURE — 72110 XR LUMBAR SPINE AP AND LAT WITH FLEX/EXT: ICD-10-PCS | Mod: 26,,, | Performed by: RADIOLOGY

## 2022-09-19 PROCEDURE — 73522 X-RAY EXAM HIPS BI 3-4 VIEWS: CPT | Mod: TC,FY,PO

## 2022-09-19 PROCEDURE — 73522 X-RAY EXAM HIPS BI 3-4 VIEWS: CPT | Mod: 26,,, | Performed by: RADIOLOGY

## 2022-09-19 PROCEDURE — 72110 X-RAY EXAM L-2 SPINE 4/>VWS: CPT | Mod: 26,,, | Performed by: RADIOLOGY

## 2022-09-19 PROCEDURE — 99214 OFFICE O/P EST MOD 30 MIN: CPT | Mod: PBBFAC,PO | Performed by: INTERNAL MEDICINE

## 2022-09-19 RX ORDER — VENLAFAXINE HYDROCHLORIDE 150 MG/1
CAPSULE, EXTENDED RELEASE ORAL
Qty: 90 CAPSULE | Refills: 3 | Status: SHIPPED | OUTPATIENT
Start: 2022-09-19 | End: 2023-10-04

## 2022-09-19 RX ORDER — ATORVASTATIN CALCIUM 40 MG/1
40 TABLET, FILM COATED ORAL DAILY
Qty: 90 TABLET | Refills: 3 | Status: SHIPPED | OUTPATIENT
Start: 2022-09-19 | End: 2023-10-04

## 2022-09-19 RX ORDER — DICLOFENAC SODIUM 10 MG/G
2 GEL TOPICAL 2 TIMES DAILY PRN
Qty: 1 EACH | Refills: 6 | Status: SHIPPED | OUTPATIENT
Start: 2022-09-19

## 2022-09-19 RX ORDER — PROPRANOLOL HYDROCHLORIDE 20 MG/1
20 TABLET ORAL 2 TIMES DAILY
Qty: 180 TABLET | Refills: 3 | Status: SHIPPED | OUTPATIENT
Start: 2022-09-19 | End: 2022-09-29

## 2022-09-19 RX ORDER — VENLAFAXINE HYDROCHLORIDE 75 MG/1
CAPSULE, EXTENDED RELEASE ORAL
Qty: 90 CAPSULE | Refills: 3 | Status: SHIPPED | OUTPATIENT
Start: 2022-09-19 | End: 2023-10-04

## 2022-09-19 NOTE — PROGRESS NOTES
Patient ID: Maddy Lopez is a 71 y.o. female.    Chief Complaint: Annual Exam    HPI Maddy is a 71 y.o. female with  prediabetes, HTN, HLD and eczema  who presents for annual exam. She complains today of pain located in right thumb that occurs off and on. No associated tingling or numbness.   Also has pain located in hips and radiating down both legs to level of ankles. Previously had injections into hips for pain relief. No associated symptoms of tingling or numbness. Pain is described as sharp and shooting in character. Of note, she previously saw neurosurgery for back pain and per their note had spinal epidural injection.  No further acute complaints or concerns.   Reviewed lab results from 9/15/22    Health Maintenance Topics with due status: Not Due       Topic Last Completion Date    Colorectal Cancer Screening 10/24/2018    DEXA Scan 02/11/2022    Lipid Panel 09/15/2022        Review of Systems   Musculoskeletal:         See HPI   All other systems reviewed and are negative.      Objective:     Vitals:    09/19/22 0930   BP: 98/64   Pulse: 74        Physical Exam  Vitals reviewed.   Constitutional:       General: She is not in acute distress.     Appearance: Normal appearance. She is well-developed. She is not ill-appearing, toxic-appearing or diaphoretic.   HENT:      Head: Normocephalic and atraumatic.      Right Ear: External ear normal.      Left Ear: External ear normal.      Nose: Nose normal.   Eyes:      General: No scleral icterus.        Right eye: No discharge.         Left eye: No discharge.      Extraocular Movements: Extraocular movements intact.      Conjunctiva/sclera: Conjunctivae normal.   Cardiovascular:      Rate and Rhythm: Normal rate and regular rhythm.      Heart sounds: Normal heart sounds. No murmur heard.    No friction rub. No gallop.   Pulmonary:      Effort: Pulmonary effort is normal. No respiratory distress.      Breath sounds: Normal breath sounds. No stridor. No  wheezing, rhonchi or rales.   Musculoskeletal:        Hands:       Comments: Areas of pain as highlighted in diagram. No visible abnormalities noted. Full ROM intact actively. Had pain with palpation of sites.     Negative straight leg raise test bilaterally.  Patient reported slight worsening of pain with internal rotation of right hip.  Has full range of motion of bilateral hips passively.  Reported pain on palpation of right hip, but not left hip.   Skin:     General: Skin is warm and dry.   Neurological:      General: No focal deficit present.      Mental Status: She is alert and oriented to person, place, and time. Mental status is at baseline.   Psychiatric:         Mood and Affect: Mood normal.         Behavior: Behavior normal.         Thought Content: Thought content normal.         Judgment: Judgment normal.       Assessment:       1. Annual physical exam    2. Hyperlipidemia, unspecified hyperlipidemia type Well controlled   3. Chronic nonintractable headache, unspecified headache type Well controlled   4. Thumb pain, right Active   5. Bilateral sciatica Active   6. Bilateral hip pain Active   7. Prediabetes Chronic   8. Essential hypertension          Plan:         Annual physical exam    Hyperlipidemia, unspecified hyperlipidemia type  Comments:  Continue current medication.  Orders:  -     atorvastatin (LIPITOR) 40 MG tablet; Take 1 tablet (40 mg total) by mouth once daily.  Dispense: 90 tablet; Refill: 3    Chronic nonintractable headache, unspecified headache type  Comments:  Continue current medications and follow-up with neurology.  Orders:  -     propranoloL (INDERAL) 20 MG tablet; Take 1 tablet (20 mg total) by mouth 2 (two) times daily.  Dispense: 180 tablet; Refill: 3  -     venlafaxine (EFFEXOR-XR) 150 MG Cp24; TAKE 1 CAPSULE DAILY ALONG WITH 75 MG CAPSULE FOR TOTAL DAILY DOSE  MG  Dispense: 90 capsule; Refill: 3  -     venlafaxine (EFFEXOR-XR) 75 MG 24 hr capsule; TAKE 1 CAPSULE DAILY  (ALONG WITH 150 MG CAPSULE FOR TOTAL DAILY DOSE  MG)  Dispense: 90 capsule; Refill: 3    Thumb pain, right  Comments:  See AVS  Orders:  -     diclofenac sodium (VOLTAREN) 1 % Gel; Apply 2 g topically 2 (two) times daily as needed (pain).  Dispense: 1 each; Refill: 6    Bilateral sciatica  -     X-Ray Lumbar Spine Ap Lateral w/Flex Ext; Future; Expected date: 09/19/2022    Bilateral hip pain  -     X-Ray Hip 3 or 4 views Bilateral; Future; Expected date: 09/19/2022    Prediabetes  Comments:  Recommend low sugar/low carb diet and regular exercise.    Essential hypertension  Comments:  Stop amlodipine 2.5 mg daily. Do Nurse BP check in one month. See AVS      Nurse BP check in one month    RTC 6 months     Warning signs discussed, patient to call with any further issues or worsening of symptoms.       Parts of the above note were dictated using a voice dictation software. Please excuse any grammatical or typographical errors.

## 2022-09-19 NOTE — PATIENT INSTRUCTIONS
Thumb pain--arthritis and soft tissue overuse injury:  Use ice or heat, topical products like voltaren gel or lidocaine gel, tylenol and ibuprofen as needed for pain relief.   If no relief with this, please let me know for Ortho referral.     You can check BP at home. Your goal is BP <130/80. If BP is higher than this on 2 or more occasions, let me know.

## 2022-09-20 ENCOUNTER — TELEPHONE (OUTPATIENT)
Dept: INTERNAL MEDICINE | Facility: CLINIC | Age: 72
End: 2022-09-20

## 2022-09-20 ENCOUNTER — PATIENT MESSAGE (OUTPATIENT)
Dept: INTERNAL MEDICINE | Facility: CLINIC | Age: 72
End: 2022-09-20
Payer: MEDICARE

## 2022-09-20 NOTE — TELEPHONE ENCOUNTER
Spoke to patient and notified that her blood work has not been review yet that we would call when the doctor review the results. Patient voices understanding.

## 2022-09-20 NOTE — TELEPHONE ENCOUNTER
----- Message from King Parikh sent at 9/20/2022 11:07 AM CDT -----  Contact: pt  Type: Requesting to speak with nurse        Who Called: PT  Regarding: discuss results   Would the patient rather a call back or a response via [facility]? Call back  Best Call Back Number: [phone]  Additional Information:

## 2022-09-21 DIAGNOSIS — M54.31 BILATERAL SCIATICA: Primary | ICD-10-CM

## 2022-09-21 DIAGNOSIS — M54.32 BILATERAL SCIATICA: Primary | ICD-10-CM

## 2022-09-27 DIAGNOSIS — R51.9 CHRONIC NONINTRACTABLE HEADACHE, UNSPECIFIED HEADACHE TYPE: ICD-10-CM

## 2022-09-27 DIAGNOSIS — G89.29 CHRONIC NONINTRACTABLE HEADACHE, UNSPECIFIED HEADACHE TYPE: ICD-10-CM

## 2022-09-27 RX ORDER — PROPRANOLOL HYDROCHLORIDE 20 MG/1
20 TABLET ORAL 2 TIMES DAILY
Qty: 180 TABLET | Refills: 3 | OUTPATIENT
Start: 2022-09-27 | End: 2022-12-26

## 2022-09-27 NOTE — TELEPHONE ENCOUNTER
----- Message from Liang Lambert sent at 9/27/2022  8:26 AM CDT -----  Contact: pt  .Type:  Needs Medical Advice    Who Called: pt    Pharmacy name and phone #:  [facility] - [address]   Phone: [phone]  Fax:  [phone]  Would the patient rather a call back or a response via [facility]? Call back  Best Call Back Number: [phone]  Additional Information: Pt. Needs a refill on her propranoloL (INDERAL) 20 MG tablet.  Pt. States that the pharmacy can not get the medication and would like something else call in for her.

## 2022-09-28 ENCOUNTER — PATIENT MESSAGE (OUTPATIENT)
Dept: INTERNAL MEDICINE | Facility: CLINIC | Age: 72
End: 2022-09-28
Payer: MEDICARE

## 2022-09-29 ENCOUNTER — TELEPHONE (OUTPATIENT)
Dept: NEUROLOGY | Facility: CLINIC | Age: 72
End: 2022-09-29
Payer: MEDICARE

## 2022-09-29 DIAGNOSIS — G43.909 MIGRAINE WITHOUT STATUS MIGRAINOSUS, NOT INTRACTABLE, UNSPECIFIED MIGRAINE TYPE: Primary | ICD-10-CM

## 2022-09-29 RX ORDER — METOPROLOL TARTRATE 25 MG/1
25 TABLET, FILM COATED ORAL 2 TIMES DAILY
Qty: 180 TABLET | Refills: 3 | Status: SHIPPED | OUTPATIENT
Start: 2022-09-29 | End: 2023-03-15 | Stop reason: SDUPTHER

## 2022-09-29 NOTE — TELEPHONE ENCOUNTER
Left message for pt advising that since she has not been seen since 2019 she would have to make an appointment before we can refill her medication

## 2022-09-29 NOTE — TELEPHONE ENCOUNTER
----- Message from Josiah Viera sent at 9/29/2022  8:12 AM CDT -----  Contact: [phone]  Who Called: PT  Regarding: pt has been trying to contact the office regarding a refill on propranoloL (INDERAL) 20 MG tablet  Would the patient rather a call back or a response via [facility]? Call back  Best Call Back Number: [phone]  Additional Information: [facility] - [place], [place] - [address] [place] [address]   Phone: [phone]

## 2022-10-10 ENCOUNTER — TELEPHONE (OUTPATIENT)
Dept: NEUROLOGY | Facility: CLINIC | Age: 72
End: 2022-10-10
Payer: MEDICARE

## 2022-10-10 NOTE — TELEPHONE ENCOUNTER
----- Message from Milena Melton sent at 10/10/2022  9:15 AM CDT -----       Type: Patient Returning Call    Who Called: Patient   Who Left Message for Patient: NA    Does the patient know what this is regarding?: Patient looking to reestablish care with the provider. She was last seen on 06/19/2019 for headaches/migraines.     Would the patient rather a call back or a response via [facility]? Call  Best Call Back Number: [phone]  Additional Information:  Please assist, thank you!

## 2022-10-18 ENCOUNTER — CLINICAL SUPPORT (OUTPATIENT)
Dept: FAMILY MEDICINE | Facility: CLINIC | Age: 72
End: 2022-10-18
Payer: MEDICARE

## 2022-10-18 VITALS — DIASTOLIC BLOOD PRESSURE: 64 MMHG | SYSTOLIC BLOOD PRESSURE: 122 MMHG

## 2022-10-18 DIAGNOSIS — I10 ESSENTIAL HYPERTENSION: Primary | ICD-10-CM

## 2022-10-19 ENCOUNTER — OFFICE VISIT (OUTPATIENT)
Dept: PAIN MEDICINE | Facility: CLINIC | Age: 72
End: 2022-10-19
Payer: MEDICARE

## 2022-10-19 ENCOUNTER — HOSPITAL ENCOUNTER (OUTPATIENT)
Dept: RADIOLOGY | Facility: HOSPITAL | Age: 72
Discharge: HOME | End: 2022-10-19
Attending: INTERNAL MEDICINE
Payer: MEDICARE

## 2022-10-19 VITALS
SYSTOLIC BLOOD PRESSURE: 135 MMHG | DIASTOLIC BLOOD PRESSURE: 84 MMHG | BODY MASS INDEX: 23.73 KG/M2 | WEIGHT: 139 LBS | HEART RATE: 71 BPM | HEIGHT: 64 IN

## 2022-10-19 DIAGNOSIS — M54.31 BILATERAL SCIATICA: ICD-10-CM

## 2022-10-19 DIAGNOSIS — M54.16 LUMBAR RADICULOPATHY, CHRONIC: Primary | ICD-10-CM

## 2022-10-19 DIAGNOSIS — Z12.31 SCREENING MAMMOGRAM FOR BREAST CANCER: ICD-10-CM

## 2022-10-19 DIAGNOSIS — M51.36 DDD (DEGENERATIVE DISC DISEASE), LUMBAR: ICD-10-CM

## 2022-10-19 DIAGNOSIS — M54.32 BILATERAL SCIATICA: ICD-10-CM

## 2022-10-19 PROBLEM — M51.369 DDD (DEGENERATIVE DISC DISEASE), LUMBAR: Status: ACTIVE | Noted: 2022-10-19

## 2022-10-19 PROCEDURE — 77063 BREAST TOMOSYNTHESIS BI: CPT | Mod: TC

## 2022-10-19 PROCEDURE — 99214 OFFICE O/P EST MOD 30 MIN: CPT | Mod: PBBFAC,PO | Performed by: PAIN MEDICINE

## 2022-10-19 PROCEDURE — 99204 OFFICE O/P NEW MOD 45 MIN: CPT | Mod: S$PBB,,, | Performed by: PAIN MEDICINE

## 2022-10-19 PROCEDURE — 77063 BREAST TOMOSYNTHESIS BI: CPT | Mod: 26,,, | Performed by: RADIOLOGY

## 2022-10-19 PROCEDURE — 99999 PR PBB SHADOW E&M-EST. PATIENT-LVL IV: CPT | Mod: PBBFAC,,, | Performed by: PAIN MEDICINE

## 2022-10-19 PROCEDURE — 99204 PR OFFICE/OUTPT VISIT, NEW, LEVL IV, 45-59 MIN: ICD-10-PCS | Mod: S$PBB,,, | Performed by: PAIN MEDICINE

## 2022-10-19 PROCEDURE — 77067 SCR MAMMO BI INCL CAD: CPT | Mod: 26,,, | Performed by: RADIOLOGY

## 2022-10-19 PROCEDURE — 77063 MAMMO DIGITAL SCREENING BILAT WITH TOMO: ICD-10-PCS | Mod: 26,,, | Performed by: RADIOLOGY

## 2022-10-19 PROCEDURE — 99999 PR PBB SHADOW E&M-EST. PATIENT-LVL IV: ICD-10-PCS | Mod: PBBFAC,,, | Performed by: PAIN MEDICINE

## 2022-10-19 PROCEDURE — 77067 MAMMO DIGITAL SCREENING BILAT WITH TOMO: ICD-10-PCS | Mod: 26,,, | Performed by: RADIOLOGY

## 2022-10-19 NOTE — PROGRESS NOTES
[facility] New Patient Evaluation    Referred by: Dr. Stephanie Boyce   Reason for referral: Bilateral sciatica     CC:   Chief Complaint   Patient presents with    Hip Pain    Back Pain    Leg Pain     Last 3 PDI Scores 10/19/2022   Pain Disability Index (PDI) 30       Subjective:   Maddy Lopez is a 72 y.o. female who presents complaining of bilateral lower back and right hip for the past 10 years. She reports pain in her lower back worse with walking and turning. No pain in foot. She reports weakness when the pain, hit, but nothing lasting. No tingling or numbness. No fevers, chills, night sweats, urinary or fecal incontinence. Reports Sammie previously, uncertain when or what level, but relieved pain in her left leg.     Location: lower back bilateral   Onset: 10 years ago   Current Pain Score: 1/10  Daily Pain of Range: 1-5/10  Quality: Sharp and Shooting  Radiation: To right calf laterally  Worsened by: twisting and walking for more than a few minutes  Improved by: water aerobics    Previous Therapies:  PT/OT: Completed 4 weeks PT in 2015   HEP: Water aerobics  Interventions:          6/27/2018 Right SIJ injection                          SAMMIE L4/5 and L5/S1                          Right facet joint injection L4/5 and L5/S1      Surgery: None  Opioids:None  Adjuvants: None, no OTCs    Current Pain Medications:  None    Review of Systems:  Review of Systems   Constitutional:  Negative for chills, fever and weight loss.   HENT:  Negative for sore throat.    Eyes:  Negative for pain.   Respiratory:  Negative for cough and shortness of breath.    Cardiovascular:  Negative for chest pain, palpitations, claudication and leg swelling.   Gastrointestinal:  Negative for nausea.   Genitourinary:  Negative for frequency and hematuria.   Musculoskeletal:  Positive for back pain. Negative for joint pain, myalgias and neck pain.   Skin:  Negative for rash.   Neurological:  Negative for tingling, sensory  "change, focal weakness, weakness and headaches.   Endo/Heme/Allergies:  Does not bruise/bleed easily.   Psychiatric/Behavioral:  Negative for depression.    All other systems reviewed and are negative.    History:  Current medications, allergies, medical history, surgical history,   family history, and social history were reviewed in the chart as marked.    Physical Exam:  Vitals:    10/19/22 0910   BP: 135/84   Pulse: 71   Weight: 63 kg (139 lb)   Height: 5' 4" (1.626 m)   PainSc:   6   PainLoc: Hip     General    Constitutional: She appears well-developed and well-nourished. No distress.   HENT:   Head: Normocephalic and atraumatic.   Right Ear: External ear normal.   Nose: Nose normal.   Eyes: Conjunctivae are normal. Pupils are equal, round, and reactive to light.   Cardiovascular:  Normal rate, regular rhythm and intact distal pulses.            Pulmonary/Chest: Effort normal.   Abdominal: She exhibits no distension.   Neurological: She is alert. Coordination normal.   Psychiatric: She has a normal mood and affect. Her behavior is normal.             Right Hip Exam     Tenderness   The patient tender to palpation of the SI joint.    Tests   Pain w/ forced internal rotation (ARLEEN): present  Pain w/ forced external rotation (FADIR): absent    Comments:  Tenderness along posterior superior iliac crest  Back (L-Spine & T-Spine) / Neck (C-Spine) Exam     Back (L-Spine & T-Spine) Range of Motion   Extension:  normal   Flexion:  normal   Lateral bend right:  normal   Lateral bend left:  normal   Rotation right:  normal   Rotation left:  normal     Other     Suggestions for Possible Nonorganic Illness                    Comments:  Negative straight leg raise bilaterally   Tenderness to palpation along gluteus medius       Muscle Strength   Right Lower Extremity   Hip Abduction: 5/5   Hip Adduction: 5/5   Hip Flexion: 5/5   Quadriceps:  5/5   Hamstrin/5   Ankle Dorsiflexion:  5/5   Anterior tibial:  5/5 "   Gastrocsoleus:  5/5   EHL:  5/5  Left Lower Extremity   Hip Abduction: 5/5   Hip Adduction: 5/5   Hip Flexion: 5/5   Quadriceps:  5/5   Hamstrin/5   Ankle Dorsiflexion:  5/5   Anterior tibial:  5/5   Gastrocsoleus:  5/5   EHL:  5/5    Reflexes     Left Side  Achilles:  2+  Ankle Clonus:  absent  Quadriceps:  2+    Right Side   Achilles:  2+  Babinski Sign:  absent  Ankle Clonus:  absent  Quadriceps:  2+    Vascular Exam     Right Pulses  Dorsalis Pedis:      2+          Left Pulses  Dorsalis Pedis:      2+        Imaging:  XR LUMBAR SPINE AP AND LAT WITH FLEX/EXT     CLINICAL HISTORY:  Sciatica, right side     TECHNIQUE:  AP and lateral views as well as lateral flexion and extension images are performed through the lumbar spine.     COMPARISON:  2019     FINDINGS:  Reconfirmed mild lumbar levocurvature.  No acute fractures.  Preserved vertebral body heights and pedicles.  No spondylolysis reconfirmed grade 1 anterolisthesis L4 on L5.  Mild disc narrowing lumbosacral level as before.  Other disc space levels preserved.  Facet arthropathic changes lower 2 levels.  Intact right and left SI joints and visualized hip joint spaces.  Stable right of midline pelvic calcific density consistent with calcified fibroid.     Impression:     No fractures.  Mild lumbar levocurvature and degenerative spondylosis changes as described and which do not appear significantly changed to earlier exam.        Electronically signed by: Maikol Chacon  Date:                                            2022    XR HIP 3 OR 4 VIEWS BILATERAL     CLINICAL HISTORY:  Pain in right hip     TECHNIQUE:  Right and left hips AP and frogleg lateral views     COMPARISON:  Lumbar spine 2019     FINDINGS:  The skeletal structures are intact.  Both hip joint spaces are satisfactory without significant cartilage loss or erosion.  Small cystic area at right femoral head looks unchanged.     SI joints are intact.  Mid pelvis again  shows a several cm calcification consistent with involuted uterine fibroid.     Impression:     No acute abnormality or significant arthritis at the hips        Electronically signed by: Amrik Michelle,   Date:                                            09/19/2022    Lumbar spine MRI March 9, 2018:  L4-5 grade 1 degenerative spondylolisthesis with right moderate lateral recess stenosis    Labs:  BMP  Lab Results   Component Value Date     09/15/2022    K 4.3 09/15/2022     09/15/2022    CO2 28 09/15/2022    BUN 10 09/15/2022    CREATININE 1.0 09/15/2022    CALCIUM 9.3 09/15/2022    ANIONGAP 7 (L) 09/15/2022    ESTGFRAFRICA >60.0 06/09/2021    EGFRNONAA 58.0 (A) 06/09/2021     Lab Results   Component Value Date    ALT 20 09/15/2022    AST 17 09/15/2022    ALKPHOS 98 09/15/2022    BILITOT 0.6 09/15/2022     Lab Results   Component Value Date    WBC 5.92 09/15/2022    HGB 12.5 09/15/2022    HCT 37.9 09/15/2022    MCV 90 09/15/2022     09/15/2022             Assessment:  Problem List Items Addressed This Visit          Neuro    Lumbar radiculopathy, chronic - Primary    Relevant Orders    MRI Lumbar Spine Without Contrast    DDD (degenerative disc disease), lumbar       Orthopedic    Bilateral sciatica     10/19/22 - Maddy Lopez is a 72 y.o. female who  has a past medical history of Arthritis, Fever blister, Hyperlipidemia, Hypertension, Migraine headache, and Prediabetes (6/16/2020).  By history and examination this patient has chronic low back pain with radiculopathy.  The underlying cause cause is facet arthritis, degenerative disc disease, and foraminal stenosis.  Pathology is confirmed by imaging.  We discussed the underlying diagnoses and multiple treatment options including interventional procedures.  The risks and benefits of each treatment option were discussed and all questions were answered.      Treatment Plan:   Procedures:   - Schedule for TFESI L4/5, L5/S1  - Will consider  Right SIJ injection if her pain on right persists.   - Will also consider MBB given her recent spondylosis seen on x-ray.             PT/OT/HEP: Continue with water aerobics  Medications:    -  Reviewed and consistent with medication use as prescribed.  Imaging: MRI Lumbar spine    Follow Up: RTC 2 weeks after TFESI    Disclaimer: This note was partly generated using dictation software which may occasionally result in transcription errors.

## 2022-10-20 ENCOUNTER — TELEPHONE (OUTPATIENT)
Dept: PAIN MEDICINE | Facility: CLINIC | Age: 72
End: 2022-10-20
Payer: MEDICARE

## 2022-10-20 ENCOUNTER — OFFICE VISIT (OUTPATIENT)
Dept: NEUROLOGY | Facility: CLINIC | Age: 72
End: 2022-10-20
Payer: MEDICARE

## 2022-10-20 ENCOUNTER — PATIENT MESSAGE (OUTPATIENT)
Dept: PAIN MEDICINE | Facility: CLINIC | Age: 72
End: 2022-10-20
Payer: MEDICARE

## 2022-10-20 DIAGNOSIS — G43.809 OTHER MIGRAINE WITHOUT STATUS MIGRAINOSUS, NOT INTRACTABLE: Primary | ICD-10-CM

## 2022-10-20 PROCEDURE — 99202 PR OFFICE/OUTPT VISIT, NEW, LEVL II, 15-29 MIN: ICD-10-PCS | Mod: 95,,, | Performed by: PSYCHIATRY & NEUROLOGY

## 2022-10-20 PROCEDURE — 99202 OFFICE O/P NEW SF 15 MIN: CPT | Mod: 95,,, | Performed by: PSYCHIATRY & NEUROLOGY

## 2022-10-20 NOTE — TELEPHONE ENCOUNTER
----- Message from Agnes Kam sent at 10/20/2022  2:20 PM CDT -----  Type:  Patient Returning Call    Who Called:Pt   Would the patient rather a call back or a response via [facility]? Call back   Best Call Back Number:[phone]  Additional Information: waiting on a call back ... pt states Dr Pace told her someone would call her yesterday to schedule her next test

## 2022-10-20 NOTE — TELEPHONE ENCOUNTER
Patient has been scheduled for procedure on 11/3/22. Time of arrival 745 am.   Denies/Confirms blood thinners  Denies/Confirms pacemaker/ICD      Check in at the registration desk on the first floor of the hospital. [address] [place] [address]  Please DO NOT eat or drink 8 hours prior to your arrival time for the procedure, if diabetic 6 hours prior. If you are taking medications for blood pressure, heart medications, thyroid, cholesterol; you can take them with a small sip of water.  Refrain from drinking alcohol within 24 hours prior to your procedure  You will need someone to drive you home after procedure. You cannot take taxi, Uber, or Lyft.  If you start feeling sick (fever, chills, or coughing) or start on any antibiotics please contact us at [phone] to reschedule.      Pt voiced understanding.

## 2022-10-21 ENCOUNTER — TELEPHONE (OUTPATIENT)
Dept: PAIN MEDICINE | Facility: CLINIC | Age: 72
End: 2022-10-21
Payer: MEDICARE

## 2022-10-21 DIAGNOSIS — M54.16 LUMBAR RADICULOPATHY: Primary | ICD-10-CM

## 2022-10-31 ENCOUNTER — PATIENT MESSAGE (OUTPATIENT)
Dept: PAIN MEDICINE | Facility: HOSPITAL | Age: 72
End: 2022-10-31
Payer: MEDICARE

## 2022-10-31 ENCOUNTER — HOSPITAL ENCOUNTER (OUTPATIENT)
Dept: RADIOLOGY | Facility: HOSPITAL | Age: 72
Discharge: HOME | End: 2022-10-31
Attending: EMERGENCY MEDICINE
Payer: MEDICARE

## 2022-10-31 DIAGNOSIS — M54.16 LUMBAR RADICULOPATHY, CHRONIC: ICD-10-CM

## 2022-10-31 PROCEDURE — 72148 MRI LUMBAR SPINE W/O DYE: CPT | Mod: TC

## 2022-10-31 PROCEDURE — 72148 MRI LUMBAR SPINE WITHOUT CONTRAST: ICD-10-PCS | Mod: 26,,, | Performed by: RADIOLOGY

## 2022-10-31 PROCEDURE — 72148 MRI LUMBAR SPINE W/O DYE: CPT | Mod: 26,,, | Performed by: RADIOLOGY

## 2022-11-01 ENCOUNTER — TELEPHONE (OUTPATIENT)
Dept: INTERNAL MEDICINE | Facility: CLINIC | Age: 72
End: 2022-11-01
Payer: MEDICARE

## 2022-11-02 ENCOUNTER — TELEPHONE (OUTPATIENT)
Dept: PAIN MEDICINE | Facility: HOSPITAL | Age: 72
End: 2022-11-02
Payer: MEDICARE

## 2022-11-02 NOTE — TELEPHONE ENCOUNTER
Patient notified of 7:45 am arrival time on 11/3/22 and the following:  Please remember:  Check in at the registration desk on the first floor of the hospital. [address] [place] [address]  Please DO NOT eat or drink 8 hours prior to your arrival time for the procedure, if diabetic 6 hours prior. If you are taking medications for blood pressure, heart medications, thyroid, cholesterol; you can take them with a small sip of water.  Refrain from drinking alcohol within 24 hours prior to your procedure  You will need someone to drive you home after procedure. You cannot take taxi, Uber, or Lyft.  If you start feeling sick (fever, chills, or coughing) or start on any antibiotics please contact us at [phone] to reschedule.        Attempted to contact patient. No answer

## 2022-11-03 ENCOUNTER — HOSPITAL ENCOUNTER (OUTPATIENT)
Facility: HOSPITAL | Age: 72
Discharge: HOME | End: 2022-11-03
Attending: PAIN MEDICINE | Admitting: PAIN MEDICINE
Payer: MEDICARE

## 2022-11-03 VITALS
DIASTOLIC BLOOD PRESSURE: 63 MMHG | RESPIRATION RATE: 16 BRPM | OXYGEN SATURATION: 98 % | SYSTOLIC BLOOD PRESSURE: 118 MMHG | HEART RATE: 75 BPM | TEMPERATURE: 98 F | WEIGHT: 136 LBS | BODY MASS INDEX: 23.22 KG/M2 | HEIGHT: 64 IN

## 2022-11-03 DIAGNOSIS — G89.29 CHRONIC PAIN: ICD-10-CM

## 2022-11-03 DIAGNOSIS — M54.16 LUMBAR RADICULOPATHY, CHRONIC: Primary | ICD-10-CM

## 2022-11-03 PROCEDURE — 64483 NJX AA&/STRD TFRM EPI L/S 1: CPT | Mod: 50 | Performed by: PAIN MEDICINE

## 2022-11-03 PROCEDURE — 99152 MOD SED SAME PHYS/QHP 5/>YRS: CPT | Performed by: PAIN MEDICINE

## 2022-11-03 PROCEDURE — 64484 PRA INJECT ANES/STEROID FORAMEN LUMBAR/SACRAL W IMG GUIDE ,EA ADD LEVEL: ICD-10-PCS | Mod: RT,,, | Performed by: PAIN MEDICINE

## 2022-11-03 PROCEDURE — 64484 NJX AA&/STRD TFRM EPI L/S EA: CPT | Mod: RT,,, | Performed by: PAIN MEDICINE

## 2022-11-03 PROCEDURE — 64484 NJX AA&/STRD TFRM EPI L/S EA: CPT | Mod: 50 | Performed by: PAIN MEDICINE

## 2022-11-03 PROCEDURE — 64484 NJX AA&/STRD TFRM EPI L/S EA: CPT | Mod: RT

## 2022-11-03 PROCEDURE — 63600175 PHARM REV CODE 636 W HCPCS: Performed by: PAIN MEDICINE

## 2022-11-03 PROCEDURE — 64483 PR EPIDURAL INJ, ANES/STEROID, TRANSFORAMINAL, LUMB/SACR, SNGL LEVL: ICD-10-PCS | Mod: RT,,, | Performed by: PAIN MEDICINE

## 2022-11-03 PROCEDURE — 25000003 PHARM REV CODE 250: Performed by: PAIN MEDICINE

## 2022-11-03 PROCEDURE — 64483 NJX AA&/STRD TFRM EPI L/S 1: CPT | Mod: RT,,, | Performed by: PAIN MEDICINE

## 2022-11-03 PROCEDURE — 64483 NJX AA&/STRD TFRM EPI L/S 1: CPT | Mod: RT

## 2022-11-03 PROCEDURE — 25500020 PHARM REV CODE 255: Performed by: PAIN MEDICINE

## 2022-11-03 PROCEDURE — 25000003 PHARM REV CODE 250: Performed by: EMERGENCY MEDICINE

## 2022-11-03 RX ORDER — DEXAMETHASONE SODIUM PHOSPHATE 4 MG/ML
INJECTION, SOLUTION INTRA-ARTICULAR; INTRALESIONAL; INTRAMUSCULAR; INTRAVENOUS; SOFT TISSUE
Status: DISCONTINUED | OUTPATIENT
Start: 2022-11-03 | End: 2022-11-03 | Stop reason: HOSPADM

## 2022-11-03 RX ORDER — MIDAZOLAM HYDROCHLORIDE 1 MG/ML
INJECTION INTRAMUSCULAR; INTRAVENOUS
Status: DISCONTINUED | OUTPATIENT
Start: 2022-11-03 | End: 2022-11-03 | Stop reason: HOSPADM

## 2022-11-03 RX ORDER — LIDOCAINE HYDROCHLORIDE 10 MG/ML
INJECTION, SOLUTION EPIDURAL; INFILTRATION; INTRACAUDAL; PERINEURAL
Status: DISCONTINUED | OUTPATIENT
Start: 2022-11-03 | End: 2022-11-03 | Stop reason: HOSPADM

## 2022-11-03 RX ORDER — INDOMETHACIN 25 MG/1
CAPSULE ORAL
Status: DISCONTINUED | OUTPATIENT
Start: 2022-11-03 | End: 2022-11-03 | Stop reason: HOSPADM

## 2022-11-03 RX ORDER — LIDOCAINE HYDROCHLORIDE 10 MG/ML
INJECTION INFILTRATION; PERINEURAL
Status: DISCONTINUED | OUTPATIENT
Start: 2022-11-03 | End: 2022-11-03 | Stop reason: HOSPADM

## 2022-11-03 RX ORDER — SODIUM CHLORIDE 9 MG/ML
500 INJECTION, SOLUTION INTRAVENOUS CONTINUOUS
Status: DISCONTINUED | OUTPATIENT
Start: 2022-11-03 | End: 2022-11-03 | Stop reason: HOSPADM

## 2022-11-03 RX ORDER — LIDOCAINE HYDROCHLORIDE 10 MG/ML
1 INJECTION, SOLUTION EPIDURAL; INFILTRATION; INTRACAUDAL; PERINEURAL ONCE
Status: DISCONTINUED | OUTPATIENT
Start: 2022-11-03 | End: 2022-11-03 | Stop reason: HOSPADM

## 2022-11-03 RX ORDER — FENTANYL CITRATE 50 UG/ML
INJECTION, SOLUTION INTRAMUSCULAR; INTRAVENOUS
Status: DISCONTINUED | OUTPATIENT
Start: 2022-11-03 | End: 2022-11-03 | Stop reason: HOSPADM

## 2022-11-03 RX ADMIN — SODIUM CHLORIDE 500 ML: 0.9 INJECTION, SOLUTION INTRAVENOUS at 07:11

## 2022-11-03 NOTE — DISCHARGE INSTRUCTIONS
Home Care Instructions Pain Management:    1.  DIET:    You may resume your normal diet today.    2.  BATHING:    You may shower with luke warm water.    3.  DRESSING:    You may remove your bandage today.    4.  ACTIVITY LEVEL:      You may resume your normal activities 24 hours after your procedure.    5.  MEDICATIONS:    You may resume your normal medications today.    6.  SPECIAL INSTRUCTIONS:    No heat to the injection site for 24 hours including bath or shower, heating pad, moist heat or hot tubs.    Use an ice pack to the injection site for any pain or discomfort.  Apply ice packs for 20 minute intervals as needed.    If you have received any sedatives by mouth today, you can not drive for 12 hours.    If you have received sedation through an IV, you can not drive for 24 hours.    PLEASE CALL YOUR DOCTOR FOR THE FOLLOWIN.  Redness or swelling around the injection site.  2.  Fever of 101 degrees.  3.  Drainage (pus) from the injection site.  4.  For any continuous bleeding (some dried blood over the incision is normal.)    FOR EMERGENCIES:    If any unusual problems or difficulties occur during clinic hours, call [phone] or dial [phone].    Follow up with with your physician in 2-3 weeks.

## 2022-11-03 NOTE — DISCHARGE SUMMARY
[facility]  Discharge Note  Short Stay     Admit Date: 11/3/2022    Discharge Date: 11/3/2022     Attending Physician: Deep Pace Jr, MD    Diagnoses:  There are no hospital problems to display for this patient.    Discharged Condition: Good     Hospital Course: Patient was admitted for an outpatient interventional pain management procedure and tolerated the procedure well with no complications.     Final Diagnoses: Same as principal problem.     Disposition: Home or Self Care     Follow up/Patient Instructions:        Reconciled Medications:     Medication List        CONTINUE taking these medications      atorvastatin 40 MG tablet  Commonly known as: LIPITOR  Take 1 tablet (40 mg total) by mouth once daily.     cholecalciferol (vitamin D3) 50 mcg (2,000 unit) Cap capsule  Commonly known as: VITAMIN D3  Take 1 capsule by mouth once daily.     diclofenac sodium 1 % Gel  Commonly known as: VOLTAREN  Apply 2 g topically 2 (two) times daily as needed (pain).     frovatriptan 2.5 MG tablet  Commonly known as: FROVA  Take 1 tablet (2.5 mg total) by mouth as needed for Migraine. If recurs, may repeat once after 2 hours. Don't exceed 3 per week.     metoprolol tartrate 25 MG tablet  Commonly known as: LOPRESSOR  Take 1 tablet (25 mg total) by mouth 2 (two) times daily.     * venlafaxine 150 MG Cp24  Commonly known as: EFFEXOR-XR  TAKE 1 CAPSULE DAILY ALONG WITH 75 MG CAPSULE FOR TOTAL DAILY DOSE  MG     * venlafaxine 75 MG 24 hr capsule  Commonly known as: EFFEXOR-XR  TAKE 1 CAPSULE DAILY (ALONG WITH 150 MG CAPSULE FOR TOTAL DAILY DOSE  MG)           * This list has 2 medication(s) that are the same as other medications prescribed for you. Read the directions carefully, and ask your doctor or other care provider to review them with you.                 Discharge Procedure Orders (must include Diet, Follow-up, Activity)   Diet Adult Regular     No driving until:   Order Comments: If you  received sedation, no driving for 12 hrs     Remove dressing in 24 hours     Notify your health care provider if you experience any of the following:  temperature >100.4     Notify your health care provider if you experience any of the following:  severe uncontrolled pain     Notify your health care provider if you experience any of the following:  redness, tenderness, or signs of infection (pain, swelling, redness, odor or green/yellow discharge around incision site)     Notify your health care provider if you experience any of the following:  difficulty breathing or increased cough     Notify your health care provider if you experience any of the following:  severe persistent headache     Notify your health care provider if you experience any of the following:  increased confusion or weakness     Activity as tolerated       Deep Pace Jr, MD  Interventional Pain Medicine / Anesthesiology

## 2022-11-03 NOTE — OP NOTE
"Procedure Note    Pre-operative Diagnosis: Lumbar Radiculopathy  Post-operative Diagnosis: Lumbar Radiculopathy  Procedure Date: 11/03/2022  Procedure:  (1) Lumbar Transforaminal Epidural Steroid Injection, Two Levels     First Level: Right L4-5     Second Level: Right L5-S1    (2) Intraoperative fluoroscopy    (3) IV Moderate Sedation (Midazolam 2 mg, Fentanyl 25 mcg)        Indications: To alleviate pain and suffering, and reduce functional impairment associated with Lumbar radiculopathy.      The patients history and physical exam were reviewed. The risks, benefits and alternatives to the procedure were discussed, and all questions were answered to the patients satisfaction. The patient agreed to proceed, and written informed consent was verified.    Procedure in Detail: Using a fenestrated drape and Chloro-prep, the skin was prepared and draped in sterile fashion. Under fluoroscopy, the appropriate foramena were identified by ipsilateral oblique angle. A mixture of Lidocaine 1% 4 mL + Sodium Bicarbonate 1 mL was used to anesthetize the skin at the skin entry point and subcutaneous tissue with 25G 1.5" in needle. Then a 22G 5" spinal needle was advanced under intermittent fluoroscopy toward the inferolateral border of the superior pedical at each level. Fluoroscopy was then inspected from lateral and AP view and needle adjusted appropriately. There was no paresthesia with needle placement. Aspiration was negative for blood and CSF. Omnipaque contrast was injected live in an AP fluoroscopic view at each level demonstrating appropriate needle position with contrast spread into the nerve root sheath and medially into the epidural space without intravascular or intrathecal spread. Next, a 4 mL mixture of PF Lidocaine 1% (3 mL) and dexamethasone 10 mg (1 mL) was divided evenly between the two levels and injected slowly and incrementally. The patient tolerated the procedure without complaint and was transported to " the recovery room in stable condition.     Disposition: The patient tolerated the procedure well, and there were no apparent complications. Vital signs remained stable throughout the procedure. The patient was taken to the recovery area where written discharge instructions for the procedure were given.     Follow-up: RTC as scheduled      Deep Pace Jr, MD  Interventional Pain Medicine / Anesthesiology

## 2022-12-01 NOTE — PROGRESS NOTES
NEUROLOGY  [facility]    Date: 10/20/22  Patient Name: Maddy Lopez   MRN: [id number]   PCP: Stephanie Boyce  Referring Provider: Self, Rohit    .[email or web address]    Assessment:   Maddy Lopez is a 72 y.o. female presenting in follow-up for migraine headache.  Patient reports well-controlled headaches using p.r.n. Frova only though is also on Effexor for management of mood which may be contributing to headache control.  No changes to medication regimen today.  Plan:     Problem List Items Addressed This Visit    None  Visit Diagnoses       Other migraine without status migrainosus, not intractable    -  Primary          I spent a total of 15 minutes preparing to see the patient, obtaining and reviewing history and results, performing a medically appropriate exam, counseling and educating the patient/family/caregiver, documenting clinical information, coordinating care, and ordering medications, tests, procedures, and referrals.      Sage Lebron MD  [facility]   Department of Neurology    Patient note was created using MModal Dictation.  Any errors in syntax or even information may not have been identified and edited on initial review prior to signing this note.  Subjective:        HPI:   Ms. Maddy Lopez is a 72 y.o. female presenting in follow-up for migraine headaches.  Patient had truncated visit today after presenting late and experiencing audiovisual issues.  Patient reports only rare breakthrough migraine headaches that she states are controlled Frova.  She states that her PCP has been calling this in for her in the interim.  She has no other complaints today. She no longer takes propranolol and states that her headache frequency did not increase despite discontinuing this.    Prior meds: Magnesium and riboflavin, amitriptyline, topamax, depakote, effexor, frova, propranalol    PAST MEDICAL HISTORY:  Past Medical History:   Diagnosis Date    Arthritis      Fever blister     Hyperlipidemia     Hypertension     Migraine headache     Prediabetes 6/16/2020       PAST SURGICAL HISTORY:  Past Surgical History:   Procedure Laterality Date    COLONOSCOPY N/A 10/24/2018    Procedure: COLONOSCOPY/Golytely;  Surgeon: Natty Bradshaw MD;  Location: [facility] ENDO;  Service: Endoscopy;  Laterality: N/A;    LEVATOR DEHISCENCE Bilateral 2013    TRANSFORAMINAL EPIDURAL INJECTION OF STEROID Right 11/3/2022    Procedure: Injection,steroid,epidural,transforaminal right L4-5 and L5-S1;  Surgeon: Deep Pace Jr., MD;  Location: [facility] PAIN MGT;  Service: Pain Management;  Laterality: Right;       CURRENT MEDS:  Current Outpatient Medications   Medication Sig Dispense Refill    atorvastatin (LIPITOR) 40 MG tablet Take 1 tablet (40 mg total) by mouth once daily. 90 tablet 3    cholecalciferol, vitamin D3, (VITAMIN D3) 50 mcg (2,000 unit) Cap Take 1 capsule by mouth once daily.      diclofenac sodium (VOLTAREN) 1 % Gel Apply 2 g topically 2 (two) times daily as needed (pain). 1 each 6    frovatriptan (FROVA) 2.5 MG tablet Take 1 tablet (2.5 mg total) by mouth as needed for Migraine. If recurs, may repeat once after 2 hours. Don't exceed 3 per week. 36 tablet 1    metoprolol tartrate (LOPRESSOR) 25 MG tablet Take 1 tablet (25 mg total) by mouth 2 (two) times daily. 180 tablet 3    venlafaxine (EFFEXOR-XR) 150 MG Cp24 TAKE 1 CAPSULE DAILY ALONG WITH 75 MG CAPSULE FOR TOTAL DAILY DOSE  MG 90 capsule 3    venlafaxine (EFFEXOR-XR) 75 MG 24 hr capsule TAKE 1 CAPSULE DAILY (ALONG WITH 150 MG CAPSULE FOR TOTAL DAILY DOSE  MG) 90 capsule 3     Current Facility-Administered Medications   Medication Dose Route Frequency Provider Last Rate Last Admin    triamcinolone acetonide injection 10 mg  10 mg Intradermal Once Kelsy Beckett MD           ALLERGIES:  Review of patient's allergies indicates:   Allergen Reactions    Aspirin Other (See Comments)     Causes bruising         FAMILY  HISTORY:  Family History   Problem Relation Age of Onset    Macular degeneration Mother     Blindness Mother     Diabetes Mother     Hypertension Mother     Glaucoma Father     Blindness Father     Cancer Father     Amblyopia Neg Hx     Cataracts Neg Hx     Retinal detachment Neg Hx     Strabismus Neg Hx     Stroke Neg Hx     Thyroid disease Neg Hx     Melanoma Neg Hx        SOCIAL HISTORY:  Social History     Tobacco Use    Smoking status: Never    Smokeless tobacco: Never   Substance Use Topics    Alcohol use: No    Drug use: No       Review of Systems:  12 system review of systems is negative except for the symptoms mentioned in HPI.      Objective:     General: NAD, well nourished   Eyes: no tearing, discharge, no erythema   ENT: moist mucous membranes of the oral cavity, nares patent    Neck: Supple, full range of motion  Cardiovascular: Appears well perfused  Lungs: Normal work of breathing, normal chest wall excursions  Skin: No rash, lesions, or breakdown on exposed skin  Psychiatry: Mood and affect are appropriate   Abdomen: nondistended, non tender  Extremeties: No cyanosis, clubbing or edema visible    Neurological   MENTAL STATUS: Alert and oriented to person, place, and time. Attention and concentration within normal limits. Speech without dysarthria. Recent and remote memory within normal limits   CRANIAL NERVES: Visual fields intact. PERRL. EOMI. Facial sensation intact. Face symmetrical. Hearing grossly intact. Full shoulder shrug bilaterally. Tongue protrudes midline   SENSORY: Sensation is intact to light touch throughout.   MOTOR: Normal bulk. Moves all extremities symmetrically.   CEREBELLAR/COORDINATION/GAIT: Gait steady. Finger to nose intact. Normal rapid alternating movements.

## 2022-12-07 ENCOUNTER — PATIENT MESSAGE (OUTPATIENT)
Dept: INTERNAL MEDICINE | Facility: CLINIC | Age: 72
End: 2022-12-07
Payer: MEDICARE

## 2023-02-15 ENCOUNTER — PATIENT MESSAGE (OUTPATIENT)
Dept: INTERNAL MEDICINE | Facility: CLINIC | Age: 73
End: 2023-02-15
Payer: MEDICARE

## 2023-03-01 ENCOUNTER — PATIENT MESSAGE (OUTPATIENT)
Dept: INTERNAL MEDICINE | Facility: CLINIC | Age: 73
End: 2023-03-01
Payer: MEDICARE

## 2023-03-06 ENCOUNTER — TELEPHONE (OUTPATIENT)
Dept: INTERNAL MEDICINE | Facility: CLINIC | Age: 73
End: 2023-03-06
Payer: MEDICARE

## 2023-03-14 ENCOUNTER — PATIENT MESSAGE (OUTPATIENT)
Dept: INTERNAL MEDICINE | Facility: CLINIC | Age: 73
End: 2023-03-14
Payer: MEDICARE

## 2023-03-15 DIAGNOSIS — G43.909 MIGRAINE WITHOUT STATUS MIGRAINOSUS, NOT INTRACTABLE, UNSPECIFIED MIGRAINE TYPE: ICD-10-CM

## 2023-03-15 RX ORDER — METOPROLOL TARTRATE 25 MG/1
25 TABLET, FILM COATED ORAL 2 TIMES DAILY
Qty: 180 TABLET | Refills: 1 | Status: SHIPPED | OUTPATIENT
Start: 2023-03-15 | End: 2023-10-04

## 2023-03-15 NOTE — TELEPHONE ENCOUNTER
Refill Decision Note   Maddy John  is requesting a refill authorization.  Brief Assessment and Rationale for Refill:  Approve     Medication Therapy Plan:       Medication Reconciliation Completed: No   Comments:     No Care Gaps recommended.     Note composed:11:44 AM 03/15/2023

## 2023-03-15 NOTE — TELEPHONE ENCOUNTER
No new care gaps identified.  [facility] Embedded Care Gaps. Reference number: [id number]. 3/15/2023   10:15:12 AM CDT

## 2023-03-30 ENCOUNTER — OFFICE VISIT (OUTPATIENT)
Dept: INTERNAL MEDICINE | Facility: CLINIC | Age: 73
End: 2023-03-30
Payer: MEDICARE

## 2023-03-30 VITALS
SYSTOLIC BLOOD PRESSURE: 102 MMHG | BODY MASS INDEX: 23.53 KG/M2 | DIASTOLIC BLOOD PRESSURE: 60 MMHG | WEIGHT: 137.81 LBS | HEIGHT: 64 IN | HEART RATE: 83 BPM | OXYGEN SATURATION: 99 %

## 2023-03-30 DIAGNOSIS — M54.16 LUMBAR RADICULOPATHY: ICD-10-CM

## 2023-03-30 DIAGNOSIS — I10 ESSENTIAL HYPERTENSION: ICD-10-CM

## 2023-03-30 DIAGNOSIS — Z12.31 SCREENING MAMMOGRAM FOR BREAST CANCER: ICD-10-CM

## 2023-03-30 DIAGNOSIS — G89.29 CHRONIC NONINTRACTABLE HEADACHE, UNSPECIFIED HEADACHE TYPE: ICD-10-CM

## 2023-03-30 DIAGNOSIS — R07.9 CHEST PAIN, UNSPECIFIED TYPE: ICD-10-CM

## 2023-03-30 DIAGNOSIS — M85.80 OSTEOPENIA, UNSPECIFIED LOCATION: ICD-10-CM

## 2023-03-30 DIAGNOSIS — H02.401 EYELID DROOPING DISEASE, RIGHT: ICD-10-CM

## 2023-03-30 DIAGNOSIS — R73.03 PREDIABETES: ICD-10-CM

## 2023-03-30 DIAGNOSIS — Z12.11 ENCOUNTER FOR SCREENING COLONOSCOPY: ICD-10-CM

## 2023-03-30 DIAGNOSIS — E78.5 HYPERLIPIDEMIA, UNSPECIFIED HYPERLIPIDEMIA TYPE: ICD-10-CM

## 2023-03-30 DIAGNOSIS — R51.9 CHRONIC NONINTRACTABLE HEADACHE, UNSPECIFIED HEADACHE TYPE: ICD-10-CM

## 2023-03-30 PROCEDURE — 93005 ELECTROCARDIOGRAM TRACING: CPT | Mod: PBBFAC,PO | Performed by: INTERNAL MEDICINE

## 2023-03-30 PROCEDURE — 99214 PR OFFICE/OUTPT VISIT, EST, LEVL IV, 30-39 MIN: ICD-10-PCS | Mod: S$PBB,,, | Performed by: INTERNAL MEDICINE

## 2023-03-30 PROCEDURE — 99215 OFFICE O/P EST HI 40 MIN: CPT | Mod: PBBFAC,PO | Performed by: INTERNAL MEDICINE

## 2023-03-30 PROCEDURE — 99999 PR PBB SHADOW E&M-EST. PATIENT-LVL V: CPT | Mod: PBBFAC,,, | Performed by: INTERNAL MEDICINE

## 2023-03-30 PROCEDURE — 99999 PR PBB SHADOW E&M-EST. PATIENT-LVL V: ICD-10-PCS | Mod: PBBFAC,,, | Performed by: INTERNAL MEDICINE

## 2023-03-30 PROCEDURE — 93010 EKG 12-LEAD: ICD-10-PCS | Mod: S$PBB,,, | Performed by: INTERNAL MEDICINE

## 2023-03-30 PROCEDURE — 93010 ELECTROCARDIOGRAM REPORT: CPT | Mod: S$PBB,,, | Performed by: INTERNAL MEDICINE

## 2023-03-30 PROCEDURE — 99214 OFFICE O/P EST MOD 30 MIN: CPT | Mod: S$PBB,,, | Performed by: INTERNAL MEDICINE

## 2023-03-30 RX ORDER — AMLODIPINE BESYLATE 2.5 MG/1
2.5 TABLET ORAL
COMMUNITY
Start: 2023-03-20

## 2023-03-30 RX ORDER — PROPRANOLOL HYDROCHLORIDE 20 MG/1
20 TABLET ORAL 2 TIMES DAILY
COMMUNITY
Start: 2022-12-07 | End: 2023-03-30

## 2023-03-30 NOTE — PROGRESS NOTES
Patient ID: aMddy Lopez is a 72 y.o. female.    Chief Complaint: Numbness    HPI Maddy is a 72 y.o. female with prediabetes, HTN, HLD and eczema  who presents for routine follow up of medical conditions. She requests orders for colonoscopy and mammogram today.     Also requests to see a surgeon about right eyelid drooping. Has had surgery on both eyes for this issue while living in [place]. Still with eyelid drooping on the right and would like to try to have this corrected.     Complains of pain located in right side chest. Occurs off and on. Not currently having pain. Lasts for one second in duration when it occurs. No associated symptoms such as coughing, wheezing, shortness of breath or sweating. No radiation of pain. Denies acid reflux. Had EKG and stress test about 10 year ago in [place] for similar pain and it was normal.     Has tingling in right lower leg. Only occurs with sitting, not with standing. No associated pain. Follows with pain management for lumbar radiculopathy. We reviewed last MRI lumbar spine results.     Review of Systems   Eyes:         See HPI   Cardiovascular:  Positive for chest pain.   Neurological:         See HPI   All other systems reviewed and are negative.        Objective:     Vitals:    03/30/23 0848   BP: 102/60   Pulse: 83        Physical Exam  Vitals reviewed.   Constitutional:       General: She is not in acute distress.     Appearance: Normal appearance. She is well-developed. She is not ill-appearing, toxic-appearing or diaphoretic.   HENT:      Head: Normocephalic and atraumatic.      Comments: +ptosis of right upper eyelid     Right Ear: External ear normal.      Left Ear: External ear normal.      Nose: Nose normal.   Eyes:      General: No scleral icterus.        Right eye: No discharge.         Left eye: No discharge.      Extraocular Movements: Extraocular movements intact.      Conjunctiva/sclera: Conjunctivae normal.   Cardiovascular:      Rate and Rhythm:  Normal rate and regular rhythm.      Heart sounds: Normal heart sounds. No murmur heard.    No friction rub. No gallop.   Pulmonary:      Effort: Pulmonary effort is normal. No respiratory distress.      Breath sounds: Normal breath sounds. No stridor. No wheezing, rhonchi or rales.   Skin:     General: Skin is warm and dry.   Neurological:      General: No focal deficit present.      Mental Status: She is alert and oriented to person, place, and time. Mental status is at baseline.   Psychiatric:         Mood and Affect: Mood normal.         Behavior: Behavior normal.         Thought Content: Thought content normal.         Judgment: Judgment normal.       Assessment:       1. Eyelid drooping disease, right Chronic   2. Chest pain, unspecified type Inactive   3. Lumbar radiculopathy Chronic   4. Essential hypertension Well controlled   5. Chronic nonintractable headache, unspecified headache type Chronic   6. Hyperlipidemia, unspecified hyperlipidemia type Chronic   7. Prediabetes Chronic   8. Osteopenia, unspecified location Chronic   9. Screening mammogram for breast cancer    10. Encounter for screening colonoscopy          Plan:         Eyelid drooping disease, right  -     Ambulatory referral/consult to Plastic Surgery; Future; Expected date: 04/06/2023    Chest pain, unspecified type  Comments:  EKG WNL. Suspect it is muscular in nature. Reassurance given  Orders:  -     EKG 12-lead; Future    Lumbar radiculopathy  Comments:  Cause of tingling in RLE. Cont to follow with pain clinic. If it worsens, alert me for neurosurgery referral     Essential hypertension  Comments:  Continue current medication  Orders:  -     Comprehensive Metabolic Panel; Future; Expected date: 08/01/2023    Chronic nonintractable headache, unspecified headache type  Comments:  Continue to follow with neurology    Hyperlipidemia, unspecified hyperlipidemia type  Comments:  Continue current medication  Orders:  -     Lipid Panel; Future;  Expected date: 08/01/2023    Prediabetes  Comments:  Continue to monitor   Orders:  -     Hemoglobin A1C; Future; Expected date: 08/01/2023    Osteopenia, unspecified location  -     CBC Auto Differential; Future; Expected date: 08/01/2023  -     Vitamin D; Future; Expected date: 09/30/2023    Screening mammogram for breast cancer  -     Mammo Digital Screening Bilat; Future; Expected date: 10/20/2023    Encounter for screening colonoscopy  -     Case Request Endoscopy: COLONOSCOPY        RTC 6 months for annual exam    Warning signs discussed, patient to call with any further issues or worsening of symptoms.       Parts of the above note were dictated using a voice dictation software. Please excuse any grammatical or typographical errors.

## 2023-03-31 ENCOUNTER — TELEPHONE (OUTPATIENT)
Dept: FAMILY MEDICINE | Facility: CLINIC | Age: 73
End: 2023-03-31
Payer: MEDICARE

## 2023-03-31 ENCOUNTER — TELEPHONE (OUTPATIENT)
Dept: INTERNAL MEDICINE | Facility: CLINIC | Age: 73
End: 2023-03-31
Payer: MEDICARE

## 2023-05-03 ENCOUNTER — TELEPHONE (OUTPATIENT)
Dept: OPTOMETRY | Facility: CLINIC | Age: 73
End: 2023-05-03
Payer: MEDICARE

## 2023-05-03 NOTE — TELEPHONE ENCOUNTER
----- Message from Eliot Michelle, OD sent at 5/2/2023  5:10 PM CDT -----  Regarding: RE: Appt Inquiry  Checking messages for Dr Donovan.     Schedule for Routine this year.   It appears she has been seen every year in the past and is on Restasis. Her last MRx was 2021.     Dr Michelle  ----- Message -----  From: Ashley Ordonez  Sent: 5/2/2023   4:28 PM CDT  To: Brock Donovan, [place]  Subject: FW: Appt Inquiry                                 Hi Dr. Donovan! You have in her follow up notes to come back in 2 yrs. Is that correct or did you mean 1 yr.?  ----- Message -----  From: Dahiana Islas  Sent: 5/2/2023   4:23 PM CDT  To: [facility] Clinical Support  Subject: FW: Appt Inquiry                                   ----- Message -----  From: Jessica Hatch  Sent: 5/2/2023   4:18 PM CDT  To: Venus De Souza  Subject: Appt Inquiry                                     Pt called about recall letter to schedule in June 2023 and instructions in notes state Follow up in about 2 years (around 6/14/2024), or if symptoms worsen or fail to improve. Pt wants to make sure she has an update Rx for eye glasses.    Please call back to further [phone]

## 2023-07-03 ENCOUNTER — PATIENT MESSAGE (OUTPATIENT)
Dept: INTERNAL MEDICINE | Facility: CLINIC | Age: 73
End: 2023-07-03
Payer: MEDICARE

## 2023-07-14 ENCOUNTER — PATIENT MESSAGE (OUTPATIENT)
Dept: INTERNAL MEDICINE | Facility: CLINIC | Age: 73
End: 2023-07-14
Payer: MEDICARE

## 2023-07-25 DIAGNOSIS — G43.909 MIGRAINE SYNDROME: ICD-10-CM

## 2023-07-25 RX ORDER — FROVATRIPTAN SUCCINATE 2.5 MG/1
2.5 TABLET, FILM COATED ORAL
Qty: 36 TABLET | Refills: 1 | Status: SHIPPED | OUTPATIENT
Start: 2023-07-25 | End: 2023-12-15 | Stop reason: SDUPTHER

## 2023-08-08 ENCOUNTER — TELEPHONE (OUTPATIENT)
Dept: GASTROENTEROLOGY | Facility: CLINIC | Age: 73
End: 2023-08-08
Payer: MEDICARE

## 2023-08-08 NOTE — TELEPHONE ENCOUNTER
Contacted patient to schedule a colonoscopy. Patient not due until 10/2023. Patient added to call back list.

## 2023-08-08 NOTE — TELEPHONE ENCOUNTER
----- Message from Derek Lebron sent at 7/13/2023  8:11 AM CDT -----  Type:  Needs Medical Advice    Who Called: Pt  Would the patient rather a call back or a response via [facility]? call  Best Call Back Number: [phone]  Additional Information: pt would like office to give her a call to schedule her ENDOSCOPY order is listed in epic please call

## 2023-08-14 ENCOUNTER — PES CALL (OUTPATIENT)
Dept: ADMINISTRATIVE | Facility: CLINIC | Age: 73
End: 2023-08-14
Payer: MEDICARE

## 2023-08-21 ENCOUNTER — PATIENT MESSAGE (OUTPATIENT)
Dept: INTERNAL MEDICINE | Facility: CLINIC | Age: 73
End: 2023-08-21
Payer: MEDICARE

## 2023-08-22 ENCOUNTER — TELEPHONE (OUTPATIENT)
Dept: INTERNAL MEDICINE | Facility: CLINIC | Age: 73
End: 2023-08-22
Payer: MEDICARE

## 2023-08-22 NOTE — TELEPHONE ENCOUNTER
----- Message from Stephanie Boyce MD sent at 8/22/2023 12:04 PM CDT -----  Please tell her to increase amlodipine to 2 tablets (total of 5 mg) daily. Continue to check BP and call back or message with numbers in 2 days     Thanks    ----- Message -----  From: Radha Cain  Sent: 8/21/2023  10:24 AM CDT  To: Carli Brown Staff    Type:  Needs Medical Advice    Who Called:  pt    Would the patient rather a call back or a response via [facility]?  call  Best Call Back Number:  [phone]  Additional Information:  Pt would like a call back from  regarding her high blood pressure 128/68 (yesterday) 140/73 (today) 135/71 (today) 140/77 (today).

## 2023-08-28 ENCOUNTER — OFFICE VISIT (OUTPATIENT)
Dept: DERMATOLOGY | Facility: CLINIC | Age: 73
End: 2023-08-28
Payer: MEDICARE

## 2023-08-28 DIAGNOSIS — L40.9 PSORIASIS: Primary | ICD-10-CM

## 2023-08-28 PROCEDURE — 99999PBSHW PR TRIAMCI2LONE ACETONIDE INJ PER 10MG: ICD-10-PCS | Mod: PBBFAC,,,

## 2023-08-28 PROCEDURE — 11900 PR INJECTION INTO SKIN LESIONS, UP TO 7: ICD-10-PCS | Mod: S$PBB,,, | Performed by: DERMATOLOGY

## 2023-08-28 PROCEDURE — 99499 NO LOS: ICD-10-PCS | Mod: S$PBB,,, | Performed by: DERMATOLOGY

## 2023-08-28 PROCEDURE — 11900 INJECT SKIN LESIONS </W 7: CPT | Mod: PBBFAC | Performed by: DERMATOLOGY

## 2023-08-28 PROCEDURE — 99999 PR PBB SHADOW E&M-EST. PATIENT-LVL III: CPT | Mod: PBBFAC,,, | Performed by: DERMATOLOGY

## 2023-08-28 PROCEDURE — 99499 UNLISTED E&M SERVICE: CPT | Mod: S$PBB,,, | Performed by: DERMATOLOGY

## 2023-08-28 PROCEDURE — 99999PBSHW PR TRIAMCI2LONE ACETONIDE INJ PER 10MG: Mod: PBBFAC,,,

## 2023-08-28 PROCEDURE — 99213 OFFICE O/P EST LOW 20 MIN: CPT | Mod: PBBFAC | Performed by: DERMATOLOGY

## 2023-08-28 PROCEDURE — 99999 PR PBB SHADOW E&M-EST. PATIENT-LVL III: ICD-10-PCS | Mod: PBBFAC,,, | Performed by: DERMATOLOGY

## 2023-08-28 NOTE — PROGRESS NOTES
Subjective:      Patient ID:  Maddy Lopez is a 72 y.o. female who presents for   Chief Complaint   Patient presents with    Psoriasis    Hyperpigmentation     Pt has questions about skin hyperpigmentation around bilateral orbits.     Psoriasis - Follow-up  Diagnosis: Frictional lichenoid dermatosis.  Symptom course: worsening (x 2 months)  Currently using: no topicals. last seen 1/24/22 for ILK10 - resolved then reflared 2 months ago.  Affected locations: left elbow and right elbow  Signs / symptoms: dryness (flakes)  Severity: mild        Review of Systems   Constitutional:  Negative for fever.   HENT:  Negative for sore throat.    Gastrointestinal:  Negative for diarrhea.   Musculoskeletal:  Negative for arthralgias.   Skin:  Positive for rash. Negative for itching.       Objective:   Physical Exam   Constitutional: She appears well-developed and well-nourished. No distress.   Neurological: She is alert and oriented to person, place, and time. She is not disoriented.   Psychiatric: She has a normal mood and affect.   Skin:   Areas Examined (abnormalities noted in diagram):   RUE Inspected  LUE Inspection Performed            Diagram Legend     Erythematous scaling macule/papule c/w actinic keratosis       Vascular papule c/w angioma      Pigmented verrucoid papule/plaque c/w seborrheic keratosis      Yellow umbilicated papule c/w sebaceous hyperplasia      Irregularly shaped tan macule c/w lentigo     1-2 mm smooth white papules consistent with Milia      Movable subcutaneous cyst with punctum c/w epidermal inclusion cyst      Subcutaneous movable cyst c/w pilar cyst      Firm pink to brown papule c/w dermatofibroma      Pedunculated fleshy papule(s) c/w skin tag(s)      Evenly pigmented macule c/w junctional nevus     Mildly variegated pigmented, slightly irregular-bordered macule c/w mildly atypical nevus      Flesh colored to evenly pigmented papule c/w intradermal nevus       Pink pearly  papule/plaque c/w basal cell carcinoma      Erythematous hyperkeratotic cursted plaque c/w SCC      Surgical scar with no sign of skin cancer recurrence      Open and closed comedones      Inflammatory papules and pustules      Verrucoid papule consistent consistent with wart     Erythematous eczematous patches and plaques     Dystrophic onycholytic nail with subungual debris c/w onychomycosis     Umbilicated papule    Erythematous-base heme-crusted tan verrucoid plaque consistent with inflamed seborrheic keratosis     Erythematous Silvery Scaling Plaque c/w Psoriasis     See annotation      Assessment / Plan:        Psoriasis  -     triamcinolone acetonide injection 10 mg  -     MI [id number] ACETONIDE INJ PER 10MG  -     MI INJECTION INTO SKIN LESIONS, UP TO 7    Intralesional Kenalog 10mg/cc (2.0 cc total) injected into 2 lesions on the bilateral elbows today after obtaining verbal consent including risk of surrounding hypopigmentation. Patient tolerated procedure well.    Units: 2  NDC for Kenalog 10mg/cc:  0450-7615-80               No follow-ups on file.

## 2023-09-01 ENCOUNTER — TELEPHONE (OUTPATIENT)
Dept: OPTOMETRY | Facility: CLINIC | Age: 73
End: 2023-09-01
Payer: MEDICARE

## 2023-09-01 NOTE — TELEPHONE ENCOUNTER
----- Message from Kiarra Soria sent at 9/1/2023 11:03 AM CDT -----    ----- Message -----  From: Kirsten Weber  Sent: 9/1/2023  10:43 AM CDT  To: Venus THOMASON Staff    Type:  Sooner Appointment Request    Caller is requesting a sooner appointment.  Caller declined first available appointment listed below.  Caller will not accept being placed on the waitlist and is requesting a message be sent to doctor.  Name of Caller:pt  When is the first available appointment?n/a  Symptoms:Annual  Would the patient rather a call back or a response via [facility]? call  Best Call Back Number:[phone]  Additional Information:

## 2023-09-08 ENCOUNTER — TELEPHONE (OUTPATIENT)
Dept: GASTROENTEROLOGY | Facility: CLINIC | Age: 73
End: 2023-09-08
Payer: MEDICARE

## 2023-09-08 NOTE — TELEPHONE ENCOUNTER
Endoscopy Scheduling Questionnaire:         Are you taking any blood thinners? no               If Yes  Have you been on them for longer than one year? N/a    2. Have you been diagnosed with Diverticulitis in past three months?  no    3. Are you on dialysis or have Kidney Disease? no    4. Previous Colonoscopy?  yes         If yes Do you have a history of colon polyps?  yes    5. Family History of Colon Cancer: yes         Relation: Father       Age at Diagnosis: 70's      6. Are you a diabetic?  no    7. Do you have a history of constipation?  no    8. Are you taking a GLP-1 Agonist? no      Procedure scheduled with Dr. Silva  on  10/25/2023    The prep being used is Clenpiq     The patient's prep instructions were sent via patient portal.      CLENPIQ Instructions    You are scheduled for a colonoscopy with Dr. Silva on 10/25/2023 at [facility] located at [address] [place] [address].  Check in at the admit desk, first floor of the hospital (which is the building on the left).     You will receive a call 2-3 days before your colonoscopy to tell you the time to arrive.  If you have not received a call by the day before your procedure, call the Endoscopy Lab at [phone].    To ensure that your test is accurate and complete, you MUST follow these instructions listed below.  If you have any questions, please call our office at [phone].  Plan on being at the hospital for your procedure for 3-4 hours. Please contact the office at [phone] two days prior to procedure date if reschedule is needed.    1.  Follow a CLEAR LIQUID DIET for the entire day before your scheduled colonoscopy.  This means no solid food the entire day starting when you wake.  You may have as much of the clear liquids as you want throughout the day.   CLEAR LIQUID DIET:   - Avoid Red, Orange, Purple, and/or Blue food coloring   - NO DAIRY   - You can have:  Coffee with sugar (no creamer), tea, water, soda, apple or white  grape juice, chicken or beef broth/bouillon (no meat, noodles, or veggies), green/yellow popsicles, green/yellow Jell-O, lemonade.    2.  AT 5 pm the evening before your colonoscopy, OPEN ONE (1) BOTTLE OF CLENPIQ AND DRINK THE ENTIRE BOTTLE.  DRINK FIVE (5) 8-OUNCE GLASSES OF WATER (40 OUNCES TOTAL) OVER THE NEXT FIVE (5) HOURS.     3.  The endoscopy department will call you 2 days before your colonoscopy to tell you the exact time to arrive, AND to tell you the exact time to drink the 2nd portion of your prep (which will be FIVE HOURS BEFORE YOUR ARRIVAL TIME).  At this time given to you, OPEN THE OTHER ONE (1) BOTTLE OF CLENPIQ AND DRINK THE ENTIRE BOTTLE.  DRINK THREE (3) 8-OUNCE GLASSES OF WATER (24 OUNCES TOTAL) OVER THE NEXT THREE (3) HOURS. Once this is complete, you can not have anything else by mouth!    4.  You must have someone with you to DRIVE YOU HOME since you will be receiving IV sedation for the colonoscopy.    5.  It is ok to take your heart, blood pressure, and seizure medications in the morning of your test with a SIP of water.  Hold other medications until after your procedure.  Do NOT have anything else to eat or drink the morning of your colonoscopy.  It is ok to brush your teeth.    6.  If you are on blood thinners THAT YOU HAVE BEEN INSTRUCTED TO HOLD BY YOUR DOCTOR FOR THIS PROCEDURE, then do NOT take this the morning of your colonoscopy.  Do NOT stop these medications on your own, they must be approved to be held by your doctor.  Your colonoscopy can NOT be done if you are on these medications.  Examples of blood thinners include: Coumadin, Aggrenox, Plavix, Pradaxa, Reapro, Pletal, Xarelto, Ticagrelor, Brilinta, Eliquis, and high dose aspirin (325 mg).  You do not have to stop baby aspirin 81 mg.    7.  IF YOU ARE DIABETIC:  NO INSULIN OR ORAL MEDICATIONS THE MORNING OF THE COLONOSCOPY.  TAKE ONLY HALF THE DOSE OF YOUR INSULIN THE DAY BEFORE THE COLONOSCOPY.  DO NOT TAKE ANY ORAL  DIABETIC MEDICATIONS THE DAY BEFORE THE COLONOSCOPY.  IF YOU ARE AN INSULIN DEPENDENT DIABETIC WITH UNSTABLE BLOOD SUGARS, NOTIFY YOUR PRIMARY CARE PHYSICIAN FOR INSTRUCTIONS.    8. Please hold any GLP-1 medications prior to the procedure: Dulaglutide Trulicity(hold week prior), Exenatide Byetta (hold the morning of procedure), Semaglutide Ozempic (hold week prior), Liraglutide Victoza, Saxenda(hold week prior), Lixisenatide Adlyxin (hold the morning of procedure), Semaglutide Rybelsus (hold the morning of procedure), Tirzepatide Mounjaro (hold week prior)

## 2023-09-11 RX ORDER — SODIUM PICOSULFATE, MAGNESIUM OXIDE, AND ANHYDROUS CITRIC ACID 12; 3.5; 1 G/175ML; G/175ML; MG/175ML
1 LIQUID ORAL ONCE
Qty: 175 ML | Refills: 0 | Status: SHIPPED | OUTPATIENT
Start: 2023-09-11 | End: 2023-09-11

## 2023-09-29 ENCOUNTER — TELEPHONE (OUTPATIENT)
Dept: INTERNAL MEDICINE | Facility: CLINIC | Age: 73
End: 2023-09-29
Payer: MEDICARE

## 2023-09-29 NOTE — TELEPHONE ENCOUNTER
Called patient and there was no answer or chance to leave a voicemail. Sent patient a [employer] message with appt time and date and informed patient that they could call us to reschedule or that they can reschedule through the [facility] portal if the date or time does not work with their schedule.

## 2023-09-29 NOTE — TELEPHONE ENCOUNTER
----- Message from Davida Davies sent at 9/29/2023  1:28 PM CDT -----  Type:  Sooner Apoointment Request    Caller is requesting a sooner appointment.  Caller declined first available appointment listed below.  Caller will not accept being placed on the waitlist and is requesting a message be sent to doctor.  Name of Caller:pt  When is the first available appointment?jaqueline  Symptoms:diverticulitis   Would the patient rather a call back or a response via [facility]? call  Best Call Back Number:[phone]  Additional Information:

## 2023-10-04 ENCOUNTER — TELEPHONE (OUTPATIENT)
Dept: INTERNAL MEDICINE | Facility: CLINIC | Age: 73
End: 2023-10-04
Payer: MEDICARE

## 2023-10-04 DIAGNOSIS — E78.5 HYPERLIPIDEMIA, UNSPECIFIED HYPERLIPIDEMIA TYPE: ICD-10-CM

## 2023-10-04 DIAGNOSIS — G89.29 CHRONIC NONINTRACTABLE HEADACHE, UNSPECIFIED HEADACHE TYPE: ICD-10-CM

## 2023-10-04 DIAGNOSIS — R51.9 CHRONIC NONINTRACTABLE HEADACHE, UNSPECIFIED HEADACHE TYPE: ICD-10-CM

## 2023-10-04 DIAGNOSIS — G43.909 MIGRAINE WITHOUT STATUS MIGRAINOSUS, NOT INTRACTABLE, UNSPECIFIED MIGRAINE TYPE: ICD-10-CM

## 2023-10-04 RX ORDER — PROPRANOLOL HYDROCHLORIDE 20 MG/1
TABLET ORAL
Refills: 0 | OUTPATIENT
Start: 2023-10-04

## 2023-10-04 RX ORDER — VENLAFAXINE HYDROCHLORIDE 150 MG/1
CAPSULE, EXTENDED RELEASE ORAL
Qty: 90 CAPSULE | Refills: 0 | Status: SHIPPED | OUTPATIENT
Start: 2023-10-04 | End: 2024-01-22 | Stop reason: SDUPTHER

## 2023-10-04 RX ORDER — VENLAFAXINE HYDROCHLORIDE 75 MG/1
CAPSULE, EXTENDED RELEASE ORAL
Qty: 90 CAPSULE | Refills: 0 | Status: SHIPPED | OUTPATIENT
Start: 2023-10-04 | End: 2024-01-22 | Stop reason: SDUPTHER

## 2023-10-04 RX ORDER — ATORVASTATIN CALCIUM 40 MG/1
40 TABLET, FILM COATED ORAL
Qty: 90 TABLET | Refills: 0 | Status: SHIPPED | OUTPATIENT
Start: 2023-10-04 | End: 2024-01-22 | Stop reason: SDUPTHER

## 2023-10-04 RX ORDER — METOPROLOL TARTRATE 25 MG/1
25 TABLET, FILM COATED ORAL 2 TIMES DAILY
Qty: 180 TABLET | Refills: 1 | Status: SHIPPED | OUTPATIENT
Start: 2023-10-04

## 2023-10-04 NOTE — TELEPHONE ENCOUNTER
Refill Decision Note   Maddy Lopez  is requesting a refill authorization.  Brief Assessment and Rationale for Refill:  Quick Discontinue     Medication Therapy Plan: Pharmacy is requesting new scripts for the following medications without required information, (sig/ frequency/qty/etc)     Medication Reconciliation Completed: No   Comments:     No Care Gaps recommended.     Note composed:1:23 PM 10/04/2023

## 2023-10-04 NOTE — TELEPHONE ENCOUNTER
----- Message from Paris Ramsay sent at 10/4/2023  8:19 AM CDT -----  Contact: pt  Type:  RX Refill Request    Who Called: pt  Refill or New Rx:refill  RX Name and Strength:propranolol 20mg (not on list)  How is the patient currently taking it? (ex. 1XDay):N/A  Is this a 30 day or 90 day RX:N/A  Preferred Pharmacy with phone number:[facility] - [address]   Phone: [phone]  Fax:  [phone]        Local or Mail Order:Mail   Ordering Provider:  Would the patient rather a call back or a response via [facility]? Call   Best Call Back Number:[phone]  Additional Information:

## 2023-10-04 NOTE — TELEPHONE ENCOUNTER
Refill Routing Note   Medication(s) are not appropriate for processing by [facility] for the following reason(s):      Required labs outdated    [facility] action(s):  Defer  Approve Care Due:  None identified              Appointments  past 12m or future 3m with PCP    Date Provider   Last Visit   3/30/2023 Stephanie Boyce MD   Next Visit   2/6/2024 Stephanie Boyce MD   ED visits in past 90 days: 0        Note composed:12:21 PM 10/04/2023

## 2023-10-04 NOTE — TELEPHONE ENCOUNTER
----- Message from Stephanie Boyce MD sent at 10/4/2023  9:12 AM CDT -----  One year ago (September 2022), I changed her propranolol to metoprolol at her request because her pharmacy did not have propranolol. Thanks

## 2023-10-04 NOTE — TELEPHONE ENCOUNTER
No care due was identified.  [facility] Embedded Care Due Messages. Reference number: [id number].   10/04/2023 8:23:17 AM CDT

## 2023-10-09 ENCOUNTER — TELEPHONE (OUTPATIENT)
Dept: GASTROENTEROLOGY | Facility: CLINIC | Age: 73
End: 2023-10-09
Payer: MEDICARE

## 2023-10-16 ENCOUNTER — TELEPHONE (OUTPATIENT)
Dept: NEUROLOGY | Facility: CLINIC | Age: 73
End: 2023-10-16
Payer: MEDICARE

## 2023-10-16 RX ORDER — PROPRANOLOL HYDROCHLORIDE 20 MG/1
TABLET ORAL
Refills: 0 | OUTPATIENT
Start: 2023-10-16

## 2023-10-16 NOTE — TELEPHONE ENCOUNTER
----- Message from Merary Mora sent at 10/16/2023  2:27 PM CDT -----  Regarding: rx question  Contact: [phone]  Patient is requesting a call back regarding the migraine medication.   Would the patient rather a call back or a response via [facility]?  Call   Best Call Back Number:  [phone]  Additional Information:

## 2023-10-16 NOTE — TELEPHONE ENCOUNTER
Spoke with patient. Advised that her PCP changed her medication an that she needs to contact them

## 2023-10-16 NOTE — TELEPHONE ENCOUNTER
Care Due:                  Date            Visit Type   Department     Provider  --------------------------------------------------------------------------------                                EP -                              PRIMARY      [facility] INTERNAL  Last Visit: 03-      CARE ([facility])   CLIFF Boyce                              EP -                              PRIMARY      [facility] INTERNAL  Next Visit: 02-      CARE ([facility])   CLIFF Boyce                                                            Last  Test          Frequency    Reason                     Performed    Due Date  --------------------------------------------------------------------------------    CBC.........  12 months..  diclofenac...............  09-   09-    [facility] Embedded Care Due Messages. Reference number: [id number].   10/16/2023 5:18:22 PM CDT

## 2023-10-16 NOTE — TELEPHONE ENCOUNTER
----- Message from Power Johnson sent at 10/16/2023  4:40 PM CDT -----  Contact: pt  ,Type:  Medication     Who Called: pt  Pharmacy name and phone #:  [id number] - [place], [place] - [address] [facility], [address]   Phone:  [phone]  Fax:  [phone]  Would the patient rather a call back or a response via [facility]? call  Best Call Back Number: [phone]  Additional Information:   Pt requesting a call  regarding propanolol medication;  Pt stated pharm do not have it and need something close to it

## 2023-10-16 NOTE — TELEPHONE ENCOUNTER
Called patient to advise that her PCP sent in Metoprolol for her. Got recording that call could not be completed. Patient has not been seen by Dr Lebron is over a year ans will need to make a follow up appointment before refills can be issued

## 2023-10-17 ENCOUNTER — TELEPHONE (OUTPATIENT)
Dept: INTERNAL MEDICINE | Facility: CLINIC | Age: 73
End: 2023-10-17
Payer: MEDICARE

## 2023-10-17 NOTE — TELEPHONE ENCOUNTER
----- Message from Ashelysima Wellingtonharsh Magallonry sent at 10/17/2023  8:51 AM CDT -----  Type:  RX Refill Request    Who Called: pt  Refill or New Rx:refill  RX Name and Strength:propranolol  How is the patient currently taking it? (ex. 1XDay):   Is this a 30 day or 90 day RX:   Preferred Pharmacy with phone number:   Local or Mail Order:local  Ordering Provider:Dr Boyce  Would the patient rather a call back or a response via [facility]? call  Best Call Back Number:[phone]   Additional Information: pt needs a call back right away has been reaching out many times and is out of her medication needs to know why her medication is being denied Dr Boyce has been filling it for her pt has delfin with Dr Lebron in December please fill medication still has 1 refill left on prescription

## 2023-10-17 NOTE — TELEPHONE ENCOUNTER
Refill Decision Note   Maddy Lopez  is requesting a refill authorization.  Brief Assessment and Rationale for Refill:  Quick Discontinue     Medication Therapy Plan: Pharmacy is requesting new scripts for the following medications without required information, (sig/ frequency/qty/etc)       Medication Reconciliation Completed: No   Comments: Pharmacies have been requesting medications for patients without required information, (sig, frequency, qty, etc.). In addition, requests are sent for medication(s) pt. are currently not taking, and medications patients have never taken.    We have spoken to the pharmacies about these request types and advised their teams previously that we are unable to assess these New Script requests and require all details for these requests. This is a known issue and has been reported.     No Care Gaps recommended.     Note composed:11:37 PM 10/16/2023

## 2023-10-17 NOTE — TELEPHONE ENCOUNTER
Called patient and explained to them how to take their medications, and that they are taking metoprolol and not propranolol. Patient verbalized understanding

## 2023-10-20 ENCOUNTER — HOSPITAL ENCOUNTER (OUTPATIENT)
Dept: RADIOLOGY | Facility: HOSPITAL | Age: 73
Discharge: HOME | End: 2023-10-20
Attending: INTERNAL MEDICINE
Payer: MEDICARE

## 2023-10-20 DIAGNOSIS — Z12.31 SCREENING MAMMOGRAM FOR BREAST CANCER: ICD-10-CM

## 2023-10-20 PROCEDURE — 77067 SCR MAMMO BI INCL CAD: CPT | Mod: TC

## 2023-10-20 PROCEDURE — 77063 MAMMO DIGITAL SCREENING BILAT WITH TOMO: ICD-10-PCS | Mod: 26,,, | Performed by: RADIOLOGY

## 2023-10-20 PROCEDURE — 77067 SCR MAMMO BI INCL CAD: CPT | Mod: 26,,, | Performed by: RADIOLOGY

## 2023-10-20 PROCEDURE — 77063 BREAST TOMOSYNTHESIS BI: CPT | Mod: 26,,, | Performed by: RADIOLOGY

## 2023-10-20 PROCEDURE — 77067 MAMMO DIGITAL SCREENING BILAT WITH TOMO: ICD-10-PCS | Mod: 26,,, | Performed by: RADIOLOGY

## 2023-10-23 ENCOUNTER — TELEPHONE (OUTPATIENT)
Dept: ENDOSCOPY | Facility: HOSPITAL | Age: 73
End: 2023-10-23
Payer: MEDICARE

## 2023-10-23 NOTE — TELEPHONE ENCOUNTER
Spoke with patient about arrival time @ 0700.   Colon/Clenpiq    Prep instructions reviewed: the day before the procedure, follow a clear liquid diet all day, then start the first 1/2 of prep at 5pm and take 2nd 1/2 of prep @ 0200.  Pt must be completely NPO when prep completed @ 0400.              Medications: Do not take Insulin or oral diabetic medications the day of the procedure.  Take as prescribed: heart, seizure and blood pressure medication in the morning with a sip of water (less than an ounce).  Take any breathing medications and bring inhalers to hospital with you Leave all valuables and jewelry at home.     Wear comfortable clothes to procedure to change into hospital gown You cannot drive for 24 hours after your procedure because you will receive sedation for your procedure to make you comfortable.  A ride must be provided at discharge.

## 2023-10-25 ENCOUNTER — ANESTHESIA EVENT (OUTPATIENT)
Dept: ENDOSCOPY | Facility: HOSPITAL | Age: 73
End: 2023-10-25
Payer: MEDICARE

## 2023-10-25 ENCOUNTER — ANESTHESIA (OUTPATIENT)
Dept: ENDOSCOPY | Facility: HOSPITAL | Age: 73
End: 2023-10-25
Payer: MEDICARE

## 2023-10-25 ENCOUNTER — HOSPITAL ENCOUNTER (OUTPATIENT)
Facility: HOSPITAL | Age: 73
Discharge: HOME | End: 2023-10-25
Attending: INTERNAL MEDICINE | Admitting: INTERNAL MEDICINE
Payer: MEDICARE

## 2023-10-25 VITALS
BODY MASS INDEX: 22.2 KG/M2 | RESPIRATION RATE: 18 BRPM | OXYGEN SATURATION: 98 % | SYSTOLIC BLOOD PRESSURE: 113 MMHG | HEART RATE: 79 BPM | WEIGHT: 130 LBS | HEIGHT: 64 IN | DIASTOLIC BLOOD PRESSURE: 64 MMHG | TEMPERATURE: 98 F

## 2023-10-25 DIAGNOSIS — Z12.11 SCREENING FOR COLON CANCER: ICD-10-CM

## 2023-10-25 PROCEDURE — 37000009 HC ANESTHESIA EA ADD 15 MINS: Performed by: INTERNAL MEDICINE

## 2023-10-25 PROCEDURE — 45385 COLONOSCOPY W/LESION REMOVAL: CPT | Mod: PT,,, | Performed by: INTERNAL MEDICINE

## 2023-10-25 PROCEDURE — D9220A PRA ANESTHESIA: ICD-10-PCS | Mod: PT,CRNA,, | Performed by: NURSE ANESTHETIST, CERTIFIED REGISTERED

## 2023-10-25 PROCEDURE — 37000008 HC ANESTHESIA 1ST 15 MINUTES: Performed by: INTERNAL MEDICINE

## 2023-10-25 PROCEDURE — 63600175 PHARM REV CODE 636 W HCPCS: Performed by: NURSE ANESTHETIST, CERTIFIED REGISTERED

## 2023-10-25 PROCEDURE — 25000003 PHARM REV CODE 250: Performed by: NURSE ANESTHETIST, CERTIFIED REGISTERED

## 2023-10-25 PROCEDURE — 25000003 PHARM REV CODE 250: Performed by: INTERNAL MEDICINE

## 2023-10-25 PROCEDURE — 45385 PR COLONOSCOPY,REMV LESN,SNARE: ICD-10-PCS | Mod: PT,,, | Performed by: INTERNAL MEDICINE

## 2023-10-25 PROCEDURE — 88305 TISSUE EXAM BY PATHOLOGIST: CPT | Mod: 26,,, | Performed by: STUDENT IN AN ORGANIZED HEALTH CARE EDUCATION/TRAINING PROGRAM

## 2023-10-25 PROCEDURE — D9220A PRA ANESTHESIA: Mod: PT,CRNA,, | Performed by: NURSE ANESTHETIST, CERTIFIED REGISTERED

## 2023-10-25 PROCEDURE — 88305 TISSUE EXAM BY PATHOLOGIST: ICD-10-PCS | Mod: 26,,, | Performed by: STUDENT IN AN ORGANIZED HEALTH CARE EDUCATION/TRAINING PROGRAM

## 2023-10-25 PROCEDURE — 88305 TISSUE EXAM BY PATHOLOGIST: CPT | Mod: 59 | Performed by: STUDENT IN AN ORGANIZED HEALTH CARE EDUCATION/TRAINING PROGRAM

## 2023-10-25 PROCEDURE — D9220A PRA ANESTHESIA: Mod: PT,ANES,, | Performed by: ANESTHESIOLOGY

## 2023-10-25 PROCEDURE — 27201089 HC SNARE, DISP (ANY): Performed by: INTERNAL MEDICINE

## 2023-10-25 PROCEDURE — D9220A PRA ANESTHESIA: ICD-10-PCS | Mod: PT,ANES,, | Performed by: ANESTHESIOLOGY

## 2023-10-25 PROCEDURE — 45385 COLONOSCOPY W/LESION REMOVAL: CPT | Mod: PT | Performed by: INTERNAL MEDICINE

## 2023-10-25 RX ORDER — PROPOFOL 10 MG/ML
VIAL (ML) INTRAVENOUS CONTINUOUS PRN
Status: DISCONTINUED | OUTPATIENT
Start: 2023-10-25 | End: 2023-10-25

## 2023-10-25 RX ORDER — SODIUM CHLORIDE 9 MG/ML
INJECTION, SOLUTION INTRAVENOUS CONTINUOUS
Status: DISCONTINUED | OUTPATIENT
Start: 2023-10-25 | End: 2023-10-25 | Stop reason: HOSPADM

## 2023-10-25 RX ORDER — LIDOCAINE HYDROCHLORIDE 20 MG/ML
INJECTION INTRAVENOUS
Status: DISCONTINUED | OUTPATIENT
Start: 2023-10-25 | End: 2023-10-25

## 2023-10-25 RX ORDER — PROPOFOL 10 MG/ML
VIAL (ML) INTRAVENOUS
Status: DISCONTINUED | OUTPATIENT
Start: 2023-10-25 | End: 2023-10-25

## 2023-10-25 RX ADMIN — PROPOFOL 150 MCG/KG/MIN: 10 INJECTION, EMULSION INTRAVENOUS at 08:10

## 2023-10-25 RX ADMIN — PROPOFOL 70 MG: 10 INJECTION, EMULSION INTRAVENOUS at 08:10

## 2023-10-25 RX ADMIN — SODIUM CHLORIDE: 9 INJECTION, SOLUTION INTRAVENOUS at 07:10

## 2023-10-25 RX ADMIN — LIDOCAINE HYDROCHLORIDE 100 MG: 20 INJECTION, SOLUTION INTRAVENOUS at 08:10

## 2023-10-25 NOTE — [employer] PATIENT INSTRUCTIONS
Discharge Summary/Instructions after an Endoscopic Procedure  Patient Name: Maddy Lopez  Patient MRN: [id number]  Patient YOB: 1950  Wednesday, October 25, 2023  Juaquin Silva MD  Dear patient,  As a result of recent federal legislation (The Federal Cures Act), you may   receive lab or pathology results from your procedure in your [facility]   account before your physician is able to contact you. Your physician or   their representative will relay the results to you with their   recommendations at their soonest availability.  Thank you,  Your health is very important to us during the Covid Crisis. Following your   procedure today, you will receive a daily text for 2 weeks asking about   signs or symptoms of Covid 19.  Please respond to this text when you   receive it so we can follow up and keep you as safe as possible.   RESTRICTIONS:  During your procedure today, you received medications for sedation.  These   medications may affect your judgment, balance and coordination.  Therefore,   for 24 hours, you have the following restrictions:   - DO NOT drive a car, operate machinery, make legal/financial decisions,   sign important papers or drink alcohol.    ACTIVITY:  Today: no heavy lifting, straining or running due to procedural   sedation/anesthesia.  The following day: return to full activity including work.  DIET:  Eat and drink normally unless instructed otherwise.     TREATMENT FOR COMMON SIDE EFFECTS:  - Mild abdominal pain, nausea, belching, bloating or excessive gas:  rest,   eat lightly and use a heating pad.  - Sore Throat: treat with throat lozenges and/or gargle with warm salt   water.  - Because air was used during the procedure, expelling large amounts of air   from your rectum or belching is normal.  - If a bowel prep was taken, you may not have a bowel movement for 1-3 days.    This is normal.  SYMPTOMS TO WATCH FOR AND REPORT TO YOUR PHYSICIAN:  1. Abdominal pain or bloating,  other than gas cramps.  2. Chest pain.  3. Back pain.  4. Signs of infection such as: chills or fever occurring within 24 hours   after the procedure.  5. Rectal bleeding, which would show as bright red, maroon, or black stools.   (A tablespoon of blood from the rectum is not serious, especially if   hemorrhoids are present.)  6. Vomiting.  7. Weakness or dizziness.  GO DIRECTLY TO THE NEAREST EMERGENCY ROOM IF YOU HAVE ANY OF THE FOLLOWING:      Difficulty breathing              Chills and/or fever over 101 F   Persistent vomiting and/or vomiting blood   Severe abdominal pain   Severe chest pain   Black, tarry stools   Bleeding- more than one tablespoon   Any other symptom or condition that you feel may need urgent attention  Your doctor recommends these additional instructions:  If any biopsies were taken, your doctors clinic will contact you in 1 to 2   weeks with any results.  - Discharge patient to home.   - Resume previous diet.   - Continue present medications.   - Await pathology results.   - Repeat colonoscopy in 3 - 5 years for surveillance based on pathology   results.   - Patient has a contact number available for emergencies.  The signs and   symptoms of potential delayed complications were discussed with the   patient.  Return to normal activities tomorrow.  Written discharge   instructions were provided to the patient.  For questions, problems or results please call your physician - Juaquin Silva MD.  EMERGENCY PHONE NUMBER: [phone],  LAB RESULTS: [phone]  IF A COMPLICATION OR EMERGENCY SITUATION ARISES AND YOU ARE UNABLE TO REACH   YOUR PHYSICIAN - GO DIRECTLY TO THE EMERGENCY ROOM.  Juaquin Silva MD  10/25/2023 8:34:45 AM  This report has been verified and signed electronically.  Dear patient,  As a result of recent federal legislation (The Federal Cures Act), you may   receive lab or pathology results from your procedure in your [facility]   account before your  physician is able to contact you. Your physician or   their representative will relay the results to you with their   recommendations at their soonest availability.  Thank you,  [employer]

## 2023-10-25 NOTE — ANESTHESIA POSTPROCEDURE EVALUATION
Anesthesia Post Evaluation    Patient: Maddy Lopez    Procedure(s) Performed: Procedure(s) (LRB):  COLONOSCOPY (N/A)    Final Anesthesia Type: general      Patient location during evaluation: PACU  Patient participation: Yes- Able to Participate  Level of consciousness: awake and alert  Post-procedure vital signs: reviewed and stable  Pain management: adequate  Airway patency: patent    PONV status at discharge: No PONV  Anesthetic complications: no      Cardiovascular status: blood pressure returned to baseline  Respiratory status: unassisted  Hydration status: euvolemic  Follow-up not needed.          Vitals Value Taken Time   /64 10/25/23 0907   Temp 36.6 °C (97.9 °F) 10/25/23 0840   Pulse 79 10/25/23 0907   Resp 18 10/25/23 0907   SpO2 98 % 10/25/23 0907         Event Time   Out of Recovery 09:09:12         Pain/Ronald Score: Ronald Score: 10 (10/25/2023  9:07 AM)

## 2023-10-25 NOTE — ANESTHESIA PREPROCEDURE EVALUATION
"                                                                                                             10/25/2023  Maddy Lopez is a 73 y.o., female.    Pre-operative evaluation for Procedure(s) (LRB):  COLONOSCOPY (N/A)    Maddy Lopez is a 73 y.o. female     Patient Active Problem List   Diagnosis    Chronic headache disorder    Hyperlipidemia    Pain of left upper extremity    Prediabetes    Osteopenia    Family history of colon cancer in father    Essential hypertension    Bilateral sciatica    Lumbar radiculopathy, chronic    DDD (degenerative disc disease), lumbar       Review of patient's allergies indicates:   Allergen Reactions    Aspirin Other (See Comments)     Causes bruising         No current facility-administered medications on file prior to encounter.     Current Outpatient Medications on File Prior to Encounter   Medication Sig Dispense Refill    amLODIPine (NORVASC) 2.5 MG tablet Take 2.5 mg by mouth.      cholecalciferol, vitamin D3, (VITAMIN D3) 50 mcg (2,000 unit) Cap Take 1 capsule by mouth once daily.      diclofenac sodium (VOLTAREN) 1 % Gel Apply 2 g topically 2 (two) times daily as needed (pain). 1 each 6       Past Surgical History:   Procedure Laterality Date    COLONOSCOPY N/A 10/24/2018    Procedure: COLONOSCOPY/Golytely;  Surgeon: Natty Bradshaw MD;  Location: [facility] ENDO;  Service: Endoscopy;  Laterality: N/A;    LEVATOR DEHISCENCE Bilateral 2013    TRANSFORAMINAL EPIDURAL INJECTION OF STEROID Right 11/3/2022    Procedure: Injection,steroid,epidural,transforaminal right L4-5 and L5-S1;  Surgeon: Deep Pace Jr., MD;  Location: [facility] PAIN MGT;  Service: Pain Management;  Laterality: Right;         CBC:  No results found for: "WBC", "RBC", "HGB", "HCT", "PLT", "MCV", "MCH", "MCHC"    CMP:   No results found for: "NA", "K", "CL", "CO2", "BUN", "CREATININE", "GLU", "MG", "PHOS", "CALCIUM", "ALBUMIN", "PROT", "ALKPHOS", "ALT", "AST", "BILITOT"    INR:  No " "results found for: "PT", "INR", "PROTIME", "APTT"      Diagnostic Studies:      EKG:   Results for orders placed or performed in visit on 03/30/23   EKG 12-lead    Collection Time: 03/30/23  7:52 AM    Narrative    Test Reason : R07.9,    Vent. Rate : 075 BPM     Atrial Rate : 075 BPM     P-R Int : 154 ms          QRS Dur : 080 ms      QT Int : 380 ms       P-R-T Axes : 019 049 092 degrees     QTc Int : 424 ms    Normal sinus rhythm  Nonspecific T wave abnormality  Abnormal ECG  No previous ECGs available  Confirmed by James Em MD ([id number]) on 4/1/2023 3:53:26 PM    Referred By:  DR. LOPEZ           Confirmed By:James Em MD        2D Echo:  No results found for this or any previous visit.    Stress Test:   No results found for this or any previous visit.             Pre-op Assessment          Review of Systems      Physical Exam  General: Well nourished    Airway:  Mallampati: I / I  Mouth Opening: Normal  TM Distance: Normal  Tongue: Normal  Neck ROM: Normal ROM    Dental:  Intact    Chest/Lungs:  Clear to auscultation    Heart:  Rate: Normal  Rhythm: Regular Rhythm  Sounds: Normal        Anesthesia Plan  Type of Anesthesia, risks & benefits discussed:    Anesthesia Type: MAC, Gen ETT, Gen Supraglottic Airway, Gen Natural Airway  Intra-op Monitoring Plan: Standard ASA Monitors  Post Op Pain Control Plan: multimodal analgesia and peripheral nerve block  Induction:  IV  Informed Consent: Informed consent signed with the Patient and all parties understand the risks and agree with anesthesia plan.  All questions answered.   ASA Score: 2  Day of Surgery Review of History & Physical: H&P Update referred to the surgeon/provider.    Ready For Surgery From Anesthesia Perspective.     .      "

## 2023-10-25 NOTE — TRANSFER OF CARE
"Anesthesia Transfer of Care Note    Patient: Maddy Lopez    Procedure(s) Performed: Procedure(s) (LRB):  COLONOSCOPY (N/A)    Patient location: GI    Anesthesia Type: general    Transport from OR: Transported from OR on room air with adequate spontaneous ventilation    Post pain: adequate analgesia    Post assessment: no apparent anesthetic complications and tolerated procedure well    Post vital signs: stable    Level of consciousness: awake, alert and oriented    Nausea/Vomiting: no nausea/vomiting    Complications: none    Transfer of care protocol was followed      Last vitals: Visit Vitals  BP (!) 160/75 (BP Location: Left arm, Patient Position: Lying)   Pulse 92   Temp 36.8 °C (98.2 °F)   Resp 18   Ht 5' 4" (1.626 m)   Wt 59 kg (130 lb)   LMP  (LMP Unknown)   SpO2 97%   Breastfeeding No   BMI 22.31 kg/m²     "

## 2023-10-25 NOTE — H&P
Short Stay Endoscopy History and Physical    PCP - Stephanie Boyce MD    Procedure - Colonoscopy  ASA - II  Mallampati - per anesthesia  History of Anesthesia problems - no  Family history Anesthesia problems - no     HPI:  This is a 73 y.o. female here for evaluation of : Colon cancer screening    Family history of colon cancer - no  History of polyps - na  Change in bowel habits - no  Rectal bleeding - no      ROS:  Constitutional: No fevers, chills, No weight loss  ENT: No allergies  CV: No chest pain  Pulm: No shortness of breath  GI: see HPI  Derm: No rash    Medical History:  has a past medical history of Arthritis, Fever blister, Hyperlipidemia, Hypertension, Migraine headache, and Prediabetes (6/16/2020).    Surgical History:  has a past surgical history that includes Levator Dehiscence (Bilateral, 2013); Colonoscopy (N/A, 10/24/2018); and Transforaminal epidural injection of steroid (Right, 11/3/2022).    Family History: family history includes Blindness in her father and mother; Cancer in her father; Diabetes in her mother; Glaucoma in her father; Hypertension in her mother; Macular degeneration in her mother.. Otherwise no colon cancer, inflammatory bowel disease, or GI malignancies.    Social History:  reports that she has never smoked. She has never used smokeless tobacco. She reports that she does not drink alcohol and does not use drugs.    Review of patient's allergies indicates:   Allergen Reactions    Aspirin Other (See Comments)     Causes bruising         Medications:   Facility-Administered Medications Prior to Admission   Medication Dose Route Frequency Provider Last Rate Last Admin    triamcinolone acetonide injection 10 mg  10 mg Intradermal Once Kelsy Beckett MD         Medications Prior to Admission   Medication Sig Dispense Refill Last Dose    amLODIPine (NORVASC) 2.5 MG tablet Take 2.5 mg by mouth.   10/24/2023    atorvastatin (LIPITOR) 40 MG tablet TAKE 1 TABLET DAILY 90 tablet  0 Past Week    cholecalciferol, vitamin D3, (VITAMIN D3) 50 mcg (2,000 unit) Cap Take 1 capsule by mouth once daily.   Past Week    diclofenac sodium (VOLTAREN) 1 % Gel Apply 2 g topically 2 (two) times daily as needed (pain). 1 each 6 Past Week    frovatriptan (FROVA) 2.5 MG tablet Take 1 tablet (2.5 mg total) by mouth as needed for Migraine. If recurs, may repeat once after 2 hours. Don't exceed 3 per week. 36 tablet 1 Past Week    metoprolol tartrate (LOPRESSOR) 25 MG tablet TAKE 1 TABLET TWICE A  tablet 1 10/24/2023    venlafaxine (EFFEXOR-XR) 150 MG Cp24 TAKE 1 CAPSULE DAILY, ALONG WITH 75 MG CAPSULE FOR TOTAL DAILY DOSE  MG 90 capsule 0 Past Week    venlafaxine (EFFEXOR-XR) 75 MG 24 hr capsule TAKE 1 CAPSULE DAILY, ALONG WITH 150 MG CAPSULE FOR TOTAL DAILY DOSE  MG 90 capsule 0 Past Week         Objective Findings:    Vital Signs: see nursing notes  Physical Exam:  General Appearance: Well appearing in no acute distress  Eyes:    No scleral icterus  ENT: Neck supple  Lungs: CTA anteriorly  Heart:  S1, S2 normal, no murmurs heard  Abdomen: Soft, non tender, non distended with positive bowel sounds. No hepatosplenomegaly, ascites, or mass  Extremities: no edema  Skin: No rash      Labs:  Lab Results   Component Value Date    WBC 5.92 09/15/2022    HGB 12.5 09/15/2022    HCT 37.9 09/15/2022     09/15/2022    CHOL 177 09/15/2022    TRIG 109 09/15/2022    HDL 37 (L) 09/15/2022    ALT 20 09/15/2022    AST 17 09/15/2022     09/15/2022    K 4.3 09/15/2022     09/15/2022    CREATININE 1.0 09/15/2022    BUN 10 09/15/2022    CO2 28 09/15/2022    HGBA1C 6.1 (H) 09/15/2022       I have explained the risks and benefits of endoscopy procedures to the patient including but not limited to bleeding, perforation, infection, and death.    Juaquin Silva MD

## 2023-10-31 LAB
FINAL PATHOLOGIC DIAGNOSIS: NORMAL
GROSS: NORMAL
Lab: NORMAL

## 2023-11-07 ENCOUNTER — OFFICE VISIT (OUTPATIENT)
Dept: OPTOMETRY | Facility: CLINIC | Age: 73
End: 2023-11-07
Payer: MEDICARE

## 2023-11-07 DIAGNOSIS — H04.123 BILATERAL DRY EYES: ICD-10-CM

## 2023-11-07 DIAGNOSIS — H02.401 PTOSIS OF RIGHT EYELID: ICD-10-CM

## 2023-11-07 DIAGNOSIS — H52.7 REFRACTIVE ERROR: ICD-10-CM

## 2023-11-07 DIAGNOSIS — H25.13 NUCLEAR SCLEROSIS OF BOTH EYES: Primary | ICD-10-CM

## 2023-11-07 PROCEDURE — 92014 PR EYE EXAM, EST PATIENT,COMPREHESV: ICD-10-PCS | Mod: S$PBB,,, | Performed by: OPTOMETRIST

## 2023-11-07 PROCEDURE — 92014 COMPRE OPH EXAM EST PT 1/>: CPT | Mod: S$PBB,,, | Performed by: OPTOMETRIST

## 2023-11-07 PROCEDURE — 92015 PR REFRACTION: ICD-10-PCS | Mod: ,,, | Performed by: OPTOMETRIST

## 2023-11-07 PROCEDURE — 99999 PR PBB SHADOW E&M-EST. PATIENT-LVL III: CPT | Mod: PBBFAC,,, | Performed by: OPTOMETRIST

## 2023-11-07 PROCEDURE — 99999 PR PBB SHADOW E&M-EST. PATIENT-LVL III: ICD-10-PCS | Mod: PBBFAC,,, | Performed by: OPTOMETRIST

## 2023-11-07 PROCEDURE — 92015 DETERMINE REFRACTIVE STATE: CPT | Mod: ,,, | Performed by: OPTOMETRIST

## 2023-11-07 PROCEDURE — 99213 OFFICE O/P EST LOW 20 MIN: CPT | Mod: PBBFAC,PO | Performed by: OPTOMETRIST

## 2023-11-07 NOTE — PROGRESS NOTES
HPI    DLS:  6/14/22    Systane PRN OU   S/p bilateral eyelid surgery x 10 years ago    Pt here for check of ocular health. Pt states she has noticed right eyelid   drooping.  Pt states she has floaters OU that are stable.  Pt states she   gets migraines. Pt denies flashes or eye pain OU.  Pt denies itching,   tearing or burning OU.   Last edited by Nia Milner MA on 11/7/2023  8:53 AM.            Assessment /Plan     For exam results, see Encounter Report.    Nuclear sclerosis of both eyes    Ptosis of right eyelid    Bilateral dry eyes    Refractive error      Educated pt on presence of cataracts and effects on vision. No surgery at this time. Recheck in one year.   2. Recommend eval with Gagan Bauer, also get old records from doc in [place] who did original surgery  3. Recommend artificial tears. 1 drop 2x per day. Chronicity of disease and treatment discussed.   4. New Spectacle Rx given, discussed different options for glasses. RTC 1 year routine eye exam.

## 2023-12-15 ENCOUNTER — OFFICE VISIT (OUTPATIENT)
Dept: NEUROLOGY | Facility: CLINIC | Age: 73
End: 2023-12-15
Payer: MEDICARE

## 2023-12-15 DIAGNOSIS — G43.909 MIGRAINE SYNDROME: ICD-10-CM

## 2023-12-15 PROCEDURE — 99213 OFFICE O/P EST LOW 20 MIN: CPT | Mod: 95,,, | Performed by: PSYCHIATRY & NEUROLOGY

## 2023-12-15 PROCEDURE — 99213 PR OFFICE/OUTPT VISIT, EST, LEVL III, 20-29 MIN: ICD-10-PCS | Mod: 95,,, | Performed by: PSYCHIATRY & NEUROLOGY

## 2023-12-15 RX ORDER — FROVATRIPTAN SUCCINATE 2.5 MG/1
2.5 TABLET, FILM COATED ORAL
Qty: 36 TABLET | Refills: 1 | Status: SHIPPED | OUTPATIENT
Start: 2023-12-15

## 2023-12-15 NOTE — PROGRESS NOTES
NEUROLOGY  [facility] [place]    Date: 12/15/23  Patient Name: Maddy Lopez   MRN: [id number]   PCP: Stephanie Boyce  Referring Provider: No ref. provider found    The patient location is: Home  The chief complaint leading to consultation is: Migraine  Visit type: Virtual visit with synchronous audio and video  Total time spent with patient: 15 minutes  Each patient to whom he or she provides medical services by telemedicine is:  (1) informed of the relationship between the physician and patient and the respective role of any other health care provider with respect to management of the patient; and (2) notified that he or she may decline to receive medical services by telemedicine and may withdraw from such care at any time.    Notes:   Assessment:   Maddy Lopez is a 73 y.o. female presenting in follow-up for migraine headache.  Patient reports well-controlled headaches using p.r.n. Frova. Continuing PRN Frova with no changes.  Plan:     Problem List Items Addressed This Visit    None  Visit Diagnoses       Migraine syndrome        Relevant Medications    frovatriptan (FROVA) 2.5 MG tablet          I spent a total of 15 minutes preparing to see the patient, obtaining and reviewing history and results, performing a medically appropriate exam, counseling and educating the patient/family/caregiver, documenting clinical information, coordinating care, and ordering medications, tests, procedures, and referrals.    Sage Lebron MD  [facility]   Department of Neurology    Patient note was created using MModal Dictation.  Any errors in syntax or even information may not have been identified and edited on initial review prior to signing this note.  Subjective:        HPI:   Ms. Maddy Lopez is a 73 y.o. female presenting in follow-up for migraine headaches. Patient reports good migraine control with only rare clusters of breakthrough migraine. Her PCP switched her propranolol to  metoprolol for BP management and she has not had significant worsening of her headaches since the change. She has no other complaints today.    Prior meds: Magnesium and riboflavin, amitriptyline, topamax, depakote, effexor, frova, propranalol    PAST MEDICAL HISTORY:  Past Medical History:   Diagnosis Date    Arthritis     Fever blister     Hyperlipidemia     Hypertension     Migraine headache     Prediabetes 6/16/2020       PAST SURGICAL HISTORY:  Past Surgical History:   Procedure Laterality Date    COLONOSCOPY N/A 10/24/2018    Procedure: COLONOSCOPY/Golytely;  Surgeon: Natty Bradshaw MD;  Location: [facility] ENDO;  Service: Endoscopy;  Laterality: N/A;    COLONOSCOPY N/A 10/25/2023    Procedure: COLONOSCOPY;  Surgeon: Juaquin Silva MD;  Location: [facility] ENDO;  Service: Endoscopy;  Laterality: N/A;    LEVATOR DEHISCENCE Bilateral 2013    TRANSFORAMINAL EPIDURAL INJECTION OF STEROID Right 11/3/2022    Procedure: Injection,steroid,epidural,transforaminal right L4-5 and L5-S1;  Surgeon: Deep Pace Jr., MD;  Location: [facility] PAIN MGT;  Service: Pain Management;  Laterality: Right;       CURRENT MEDS:  Current Outpatient Medications   Medication Sig Dispense Refill    amLODIPine (NORVASC) 2.5 MG tablet Take 2.5 mg by mouth.      atorvastatin (LIPITOR) 40 MG tablet TAKE 1 TABLET DAILY 90 tablet 0    cholecalciferol, vitamin D3, (VITAMIN D3) 50 mcg (2,000 unit) Cap Take 1 capsule by mouth once daily.      diclofenac sodium (VOLTAREN) 1 % Gel Apply 2 g topically 2 (two) times daily as needed (pain). 1 each 6    frovatriptan (FROVA) 2.5 MG tablet Take 1 tablet (2.5 mg total) by mouth as needed for Migraine. If recurs, may repeat once after 2 hours. Don't exceed 3 per week. 36 tablet 1    metoprolol tartrate (LOPRESSOR) 25 MG tablet TAKE 1 TABLET TWICE A  tablet 1    venlafaxine (EFFEXOR-XR) 150 MG Cp24 TAKE 1 CAPSULE DAILY, ALONG WITH 75 MG CAPSULE FOR TOTAL DAILY DOSE  MG 90 capsule 0     venlafaxine (EFFEXOR-XR) 75 MG 24 hr capsule TAKE 1 CAPSULE DAILY, ALONG WITH 150 MG CAPSULE FOR TOTAL DAILY DOSE  MG 90 capsule 0     Current Facility-Administered Medications   Medication Dose Route Frequency Provider Last Rate Last Admin    triamcinolone acetonide injection 10 mg  10 mg Intradermal Once Kelsy Beckett MD           ALLERGIES:  Review of patient's allergies indicates:   Allergen Reactions    Aspirin Other (See Comments)     Causes bruising         FAMILY HISTORY:  Family History   Problem Relation Age of Onset    Macular degeneration Mother     Blindness Mother     Diabetes Mother     Hypertension Mother     Glaucoma Father     Blindness Father     Cancer Father     Amblyopia Neg Hx     Cataracts Neg Hx     Retinal detachment Neg Hx     Strabismus Neg Hx     Stroke Neg Hx     Thyroid disease Neg Hx     Melanoma Neg Hx        SOCIAL HISTORY:  Social History     Tobacco Use    Smoking status: Never    Smokeless tobacco: Never   Substance Use Topics    Alcohol use: No    Drug use: No       Review of Systems:  12 system review of systems is negative except for the symptoms mentioned in HPI.      Objective:     General: NAD, well nourished   Eyes: no tearing, discharge, no erythema   ENT: moist mucous membranes of the oral cavity, nares patent    Neck: Supple, full range of motion  Cardiovascular: Appears well perfused  Lungs: Normal work of breathing, normal chest wall excursions  Skin: No rash, lesions, or breakdown on exposed skin  Psychiatry: Mood and affect are appropriate   Abdomen: nondistended, non tender  Extremeties: No cyanosis, clubbing or edema visible    Neurological   MENTAL STATUS: Alert and oriented to person, place, and time. Attention and concentration within normal limits. Speech without dysarthria. Recent and remote memory within normal limits   CRANIAL NERVES: Visual fields intact. PERRL. EOMI. Facial sensation intact. Face symmetrical. Hearing grossly intact. Full  shoulder shrug bilaterally. Tongue protrudes midline   SENSORY: Sensation is intact to light touch throughout.   MOTOR: Normal bulk. Moves all extremities symmetrically.   CEREBELLAR/COORDINATION/GAIT: Gross motor movement smooth.

## 2023-12-29 ENCOUNTER — TELEPHONE (OUTPATIENT)
Dept: OPTOMETRY | Facility: CLINIC | Age: 73
End: 2023-12-29
Payer: MEDICARE

## 2024-01-22 DIAGNOSIS — G89.29 CHRONIC NONINTRACTABLE HEADACHE, UNSPECIFIED HEADACHE TYPE: ICD-10-CM

## 2024-01-22 DIAGNOSIS — R51.9 CHRONIC NONINTRACTABLE HEADACHE, UNSPECIFIED HEADACHE TYPE: ICD-10-CM

## 2024-01-22 DIAGNOSIS — E78.5 HYPERLIPIDEMIA, UNSPECIFIED HYPERLIPIDEMIA TYPE: ICD-10-CM

## 2024-01-22 RX ORDER — VENLAFAXINE HYDROCHLORIDE 75 MG/1
CAPSULE, EXTENDED RELEASE ORAL
Qty: 90 CAPSULE | Refills: 0 | Status: SHIPPED | OUTPATIENT
Start: 2024-01-22 | End: 2024-05-09 | Stop reason: SDUPTHER

## 2024-01-22 RX ORDER — ATORVASTATIN CALCIUM 40 MG/1
40 TABLET, FILM COATED ORAL DAILY
Qty: 90 TABLET | Refills: 0 | Status: SHIPPED | OUTPATIENT
Start: 2024-01-22 | End: 2024-05-09 | Stop reason: SDUPTHER

## 2024-01-22 RX ORDER — VENLAFAXINE HYDROCHLORIDE 150 MG/1
150 CAPSULE, EXTENDED RELEASE ORAL DAILY
Qty: 90 CAPSULE | Refills: 0 | Status: SHIPPED | OUTPATIENT
Start: 2024-01-22 | End: 2024-05-09 | Stop reason: SDUPTHER

## 2024-01-22 NOTE — TELEPHONE ENCOUNTER
Care Due:                  Date            Visit Type   Department     Provider  --------------------------------------------------------------------------------                                EP -                              PRIMARY      [facility] INTERNAL  Last Visit: 03-      CARE ([facility])   CLIFF Boyce                              [facility]                              PRIMARY      [facility] INTERNAL  Next Visit: 03-      CARE ([facility])   CLIFF Boyce                                                            Last  Test          Frequency    Reason                     Performed    Due Date  --------------------------------------------------------------------------------    CBC.........  12 months..  diclofenac...............  09-   09-    CMP.........  12 months..  atorvastatin, diclofenac,   09-   09-                             venlafaxine..............    Lipid Panel.  12 months..  atorvastatin.............  09-   09-    [facility] Embedded Care Due Messages. Reference number: [id number].   1/22/2024 9:42:39 AM CST

## 2024-01-22 NOTE — TELEPHONE ENCOUNTER
----- Message from Radha Cain sent at 1/20/2024  8:53 AM CST -----  Type:  Needs Medical Advice    Who Called:  Pt  Symptoms (please be specific):  Diarrhea  How long has patient had these symptoms:   1 week  Pharmacy name and phone #:   [facility] [address] [address]  #[phone]  Would the patient rather a call back or a response via [facility]?  call  Best Call Back Number:  [phone]  Additional Information:  Pt is requesting medication for her symptoms or be seen if possible.  First available appt on my end is until April.       Pt is also requesting a clearance for eye surgery and would like a  a call back.

## 2024-01-22 NOTE — TELEPHONE ENCOUNTER
Patient stated that they needed a form filled out and some refills. Patient stated she went to urgent care about the diarrhea already

## 2024-01-23 ENCOUNTER — OFFICE VISIT (OUTPATIENT)
Dept: INTERNAL MEDICINE | Facility: CLINIC | Age: 74
End: 2024-01-23
Payer: MEDICARE

## 2024-01-23 VITALS
WEIGHT: 138.44 LBS | DIASTOLIC BLOOD PRESSURE: 84 MMHG | SYSTOLIC BLOOD PRESSURE: 130 MMHG | BODY MASS INDEX: 23.64 KG/M2 | HEIGHT: 64 IN

## 2024-01-23 DIAGNOSIS — R19.7 DIARRHEA, UNSPECIFIED TYPE: Primary | ICD-10-CM

## 2024-01-23 PROCEDURE — 99999 PR PBB SHADOW E&M-EST. PATIENT-LVL III: CPT | Mod: PBBFAC,,, | Performed by: INTERNAL MEDICINE

## 2024-01-23 PROCEDURE — 99213 OFFICE O/P EST LOW 20 MIN: CPT | Mod: PBBFAC,PO | Performed by: INTERNAL MEDICINE

## 2024-01-23 PROCEDURE — 99214 OFFICE O/P EST MOD 30 MIN: CPT | Mod: S$PBB,,, | Performed by: INTERNAL MEDICINE

## 2024-01-23 NOTE — PROGRESS NOTES
"Assessment:         1. Diarrhea, unspecified type          Plan:           Maddy was seen today for diarrhea.    Diagnoses and all orders for this visit:    Diarrhea, unspecified type    New   Uncontrolled  Signs, symptoms consistent with antibiotic associated diarrehea   history or pyhsical exam do not suggest acute abdomen   DDx includes but not limited to c diff   I provided instruction on supportive care measures   Prior tesing reviewed and new testing was was given   reviewed signs and symptoms that should prompt return to provider or evaluation in the ED  -     Clostridium difficile EIA; Future  -     Stool culture; Future              Subjective:       Patient ID: Maddy Lopez is a 73 y.o. female.    Chief Complaint: Diarrhea      Symptoms of abdominal pain started roughly 2 weeks ago.  She went to urgent care and given prescription for Cipro and Flagyl which she started on the 15th today is her last day.  She states that she started having diarrhea after starting the antibiotics.  She has improved abdominal pain but the diarrhea is roughly 4-5 times daily. She reports that the color is dark brown.  She does report nighttime symptoms.  She has no other new foods sick contacts or recent travel new hobbies or pets.Denies  fevers, chills, sweats, sweats, melena, hematochezia, nausea, vomiting, mucus, tenemus. Out of town had a few months ago got a medication for diverticulitis         HPI    Review of Systems   All other systems reviewed and are negative.            Health Maintenance Due   Topic Date Due    RSV Vaccine (Age 60+ and Pregnant patients) (1 - 1-dose 60+ series) Never done    TETANUS VACCINE  03/21/2021    Influenza Vaccine (1) 09/01/2023    Hemoglobin A1c (Prediabetes)  09/15/2023         Objective:     /84 (BP Location: Right arm, Patient Position: Sitting, BP Method: Large (Manual))   Ht 5' 4" (1.626 m)   Wt 62.8 kg (138 lb 7.2 oz)   LMP  (LMP Unknown)   BMI 23.76 kg/m²         " "1/23/2024    10:53 AM 10/25/2023     7:17 AM 3/30/2023     8:48 AM 11/3/2022     7:18 AM 10/19/2022     9:10 AM   Vitals   Height 5' 4" (1.626 m) 5' 4" (1.626 m) 5' 4" (1.626 m) 5' 4" (1.626 m) 5' 4" (1.626 m)   Weight (lbs) 138.45 130 137.79 136 139   BMI (kg/m2) 23.8 22.3 23.6 23.3 23.8                Physical Exam  Vitals and nursing note reviewed.   Constitutional:       General: She is not in acute distress.     Appearance: She is well-developed. She is not diaphoretic.   HENT:      Head: Normocephalic.      Nose: Nose normal.   Eyes:      General:         Right eye: No discharge.         Left eye: No discharge.      Conjunctiva/sclera: Conjunctivae normal.      Pupils: Pupils are equal, round, and reactive to light.   Cardiovascular:      Rate and Rhythm: Normal rate and regular rhythm.      Heart sounds: Normal heart sounds. No murmur heard.     No friction rub. No gallop.   Pulmonary:      Effort: Pulmonary effort is normal. No respiratory distress.   Abdominal:      General: Abdomen is flat. Bowel sounds are normal. There is no distension.      Palpations: Abdomen is soft. There is no mass.      Tenderness: There is no abdominal tenderness. There is no guarding or rebound.      Hernia: No hernia is present.   Musculoskeletal:         General: No deformity. Normal range of motion.      Cervical back: Normal range of motion.   Skin:     General: Skin is warm.   Neurological:      Mental Status: She is alert and oriented to person, place, and time.      Cranial Nerves: No cranial nerve deficit.             Recommendation:        - Discharge patient to home.                          - Resume previous diet.                          - Continue present medications.                          - Await pathology results.                          - Repeat colonoscopy in 3 - 5 years for                          surveillance based on pathology results.                          - Patient has a contact number available for "                          emergencies. The signs and symptoms of potential                          delayed complications were discussed with the                          patient. Return to normal activities tomorrow.                          Written discharge instructions were provided to                          the patient.   Juaquin Silva MD   10/25/2023 8:34:45 AM       Good Morning Maddy. I have your results here from Dr. Silva. He says  Pathology from recent colonoscopy reviewed.  Colon polyp(s) benign.  Repeat colonoscopy in 5 years. If you have any questions please feel free to give us a call at  [phone] or send us a message.  -Have a Great Day.    Future Appointments   Date Time Provider Department Center   3/26/2024  7:45 AM LAB, [facility]   3/28/2024  9:20 AM Stephanie Boyce MD [facility]         Medication List with Changes/Refills   Current Medications    AMLODIPINE (NORVASC) 2.5 MG TABLET    Take 2.5 mg by mouth.    ATORVASTATIN (LIPITOR) 40 MG TABLET    Take 1 tablet (40 mg total) by mouth once daily.    CHOLECALCIFEROL, VITAMIN D3, (VITAMIN D3) 50 MCG (2,000 UNIT) CAP    Take 1 capsule by mouth once daily.    DICLOFENAC SODIUM (VOLTAREN) 1 % GEL    Apply 2 g topically 2 (two) times daily as needed (pain).    FROVATRIPTAN (FROVA) 2.5 MG TABLET    Take 1 tablet (2.5 mg total) by mouth as needed for Migraine. If recurs, may repeat once after 2 hours. Don't exceed 3 per week.    METOPROLOL TARTRATE (LOPRESSOR) 25 MG TABLET    TAKE 1 TABLET TWICE A DAY    VENLAFAXINE (EFFEXOR-XR) 150 MG CP24    Take 1 capsule (150 mg total) by mouth once daily.    VENLAFAXINE (EFFEXOR-XR) 75 MG 24 HR CAPSULE    TAKE 1 CAPSULE DAILY, ALONG WITH 150 MG CAPSULE FOR TOTAL DAILY DOSE  MG         Disclaimer:  This note has been generated using voice-recognition software. There may be grammatical or spelling errors that have been missed during proof-reading

## 2024-01-29 ENCOUNTER — TELEPHONE (OUTPATIENT)
Dept: INTERNAL MEDICINE | Facility: CLINIC | Age: 74
End: 2024-01-29
Payer: MEDICARE

## 2024-01-29 NOTE — TELEPHONE ENCOUNTER
Sent patient a [employer] message with appt time and date and informed patient that they could call us to reschedule or that they can reschedule through the [facility] portal if the date or time does not work with their schedule.

## 2024-01-29 NOTE — TELEPHONE ENCOUNTER
----- Message from Stephanie Boyce MD sent at 1/29/2024  2:20 PM CST -----  Sounds like she needs a preop visit. Thanks  ----- Message -----  From: Ashlee Mosher  Sent: 1/29/2024  10:56 AM CST  To: Carli Brown Staff    Type:  clearance forms     Who Called:pt   Does the patient know what this is regarding?:clearance forms needs to be signed by provider for a procedure   Would the patient rather a call back or a response [facility]? Call   Best Call Back Number: [phone]  Additional Information:

## 2024-01-30 ENCOUNTER — LAB VISIT (OUTPATIENT)
Dept: LAB | Facility: HOSPITAL | Age: 74
End: 2024-01-30
Attending: INTERNAL MEDICINE
Payer: MEDICARE

## 2024-01-30 ENCOUNTER — OFFICE VISIT (OUTPATIENT)
Dept: INTERNAL MEDICINE | Facility: CLINIC | Age: 74
End: 2024-01-30
Payer: MEDICARE

## 2024-01-30 VITALS
HEART RATE: 85 BPM | OXYGEN SATURATION: 97 % | DIASTOLIC BLOOD PRESSURE: 74 MMHG | WEIGHT: 138.88 LBS | BODY MASS INDEX: 23.71 KG/M2 | SYSTOLIC BLOOD PRESSURE: 130 MMHG | HEIGHT: 64 IN

## 2024-01-30 DIAGNOSIS — I10 ESSENTIAL HYPERTENSION: ICD-10-CM

## 2024-01-30 DIAGNOSIS — R51.9 CHRONIC NONINTRACTABLE HEADACHE, UNSPECIFIED HEADACHE TYPE: ICD-10-CM

## 2024-01-30 DIAGNOSIS — R73.03 PREDIABETES: ICD-10-CM

## 2024-01-30 DIAGNOSIS — Z01.818 PREOPERATIVE EXAMINATION: Primary | ICD-10-CM

## 2024-01-30 DIAGNOSIS — G89.29 CHRONIC NONINTRACTABLE HEADACHE, UNSPECIFIED HEADACHE TYPE: ICD-10-CM

## 2024-01-30 DIAGNOSIS — M85.80 OSTEOPENIA, UNSPECIFIED LOCATION: ICD-10-CM

## 2024-01-30 DIAGNOSIS — E78.5 HYPERLIPIDEMIA, UNSPECIFIED HYPERLIPIDEMIA TYPE: ICD-10-CM

## 2024-01-30 DIAGNOSIS — Z01.818 PREOPERATIVE EXAMINATION: ICD-10-CM

## 2024-01-30 LAB
ALBUMIN SERPL BCP-MCNC: 4 G/DL (ref 3.5–5.2)
ALP SERPL-CCNC: 93 U/L (ref 55–135)
ALT SERPL W/O P-5'-P-CCNC: 33 U/L (ref 10–44)
ANION GAP SERPL CALC-SCNC: 5 MMOL/L (ref 8–16)
AST SERPL-CCNC: 23 U/L (ref 10–40)
BILIRUB SERPL-MCNC: 0.7 MG/DL (ref 0.1–1)
BUN SERPL-MCNC: 11 MG/DL (ref 8–23)
CALCIUM SERPL-MCNC: 10.1 MG/DL (ref 8.7–10.5)
CHLORIDE SERPL-SCNC: 104 MMOL/L (ref 95–110)
CO2 SERPL-SCNC: 29 MMOL/L (ref 23–29)
CREAT SERPL-MCNC: 0.9 MG/DL (ref 0.5–1.4)
EST. GFR  (NO RACE VARIABLE): >60 ML/MIN/1.73 M^2
GLUCOSE SERPL-MCNC: 84 MG/DL (ref 70–110)
POTASSIUM SERPL-SCNC: 4.3 MMOL/L (ref 3.5–5.1)
PROT SERPL-MCNC: 7.1 G/DL (ref 6–8.4)
SODIUM SERPL-SCNC: 138 MMOL/L (ref 136–145)

## 2024-01-30 PROCEDURE — 99213 OFFICE O/P EST LOW 20 MIN: CPT | Mod: PBBFAC,PO | Performed by: INTERNAL MEDICINE

## 2024-01-30 PROCEDURE — 93010 ELECTROCARDIOGRAM REPORT: CPT | Mod: S$PBB,,, | Performed by: INTERNAL MEDICINE

## 2024-01-30 PROCEDURE — 85025 COMPLETE CBC W/AUTO DIFF WBC: CPT | Performed by: INTERNAL MEDICINE

## 2024-01-30 PROCEDURE — 99214 OFFICE O/P EST MOD 30 MIN: CPT | Mod: S$PBB,,, | Performed by: INTERNAL MEDICINE

## 2024-01-30 PROCEDURE — 93005 ELECTROCARDIOGRAM TRACING: CPT | Mod: PBBFAC,PO | Performed by: INTERNAL MEDICINE

## 2024-01-30 PROCEDURE — 36415 COLL VENOUS BLD VENIPUNCTURE: CPT | Mod: PO | Performed by: INTERNAL MEDICINE

## 2024-01-30 PROCEDURE — 80053 COMPREHEN METABOLIC PANEL: CPT | Performed by: INTERNAL MEDICINE

## 2024-01-30 PROCEDURE — 99999 PR PBB SHADOW E&M-EST. PATIENT-LVL III: CPT | Mod: PBBFAC,,, | Performed by: INTERNAL MEDICINE

## 2024-01-30 NOTE — PROGRESS NOTES
PREOPERATIVE EXAMINATION    Chief Complaint   Patient presents with    Pre-op Exam        HPI:Maddy is a 72 year old female with  prediabetes, HTN, HLD a chronic headache disorder, osteopenia and eczema  who presents for preoperative evaluation prior to undergoing bilateral blepharoplasty.  Patient has no new acute complaints or concerns today.  Patient can walk up a flight of stairs without shortness of breath or chest pain.  Patient can do grocery shopping and house cleaning without shortness of breath or chest pain.  Patient was exercising prior to the start of the new year and she was doing this without shortness of breath or chest pain.  Patient has undergone multiple operations in the past.  She has had no adverse side effects associated with anesthesia.  No bleeding or blood clotting issues intraoperatively or postoperatively.    Past Medical History:   Diagnosis Date    Arthritis     Fever blister     Hyperlipidemia     Hypertension     Migraine headache     Prediabetes 6/16/2020        Past Surgical History:   Procedure Laterality Date    COLONOSCOPY N/A 10/24/2018    Procedure: COLONOSCOPY/Golytely;  Surgeon: Natty Bradshaw MD;  Location: [facility] ENDO;  Service: Endoscopy;  Laterality: N/A;    COLONOSCOPY N/A 10/25/2023    Procedure: COLONOSCOPY;  Surgeon: Juaquin Silva MD;  Location: [facility] ENDO;  Service: Endoscopy;  Laterality: N/A;    LEVATOR DEHISCENCE Bilateral 2013    TRANSFORAMINAL EPIDURAL INJECTION OF STEROID Right 11/3/2022    Procedure: Injection,steroid,epidural,transforaminal right L4-5 and L5-S1;  Surgeon: Deep Pace Jr., MD;  Location: [facility] PAIN MGT;  Service: Pain Management;  Laterality: Right;          Current Outpatient Medications:     amLODIPine (NORVASC) 2.5 MG tablet, Take 2.5 mg by mouth., Disp: , Rfl:     atorvastatin (LIPITOR) 40 MG tablet, Take 1 tablet (40 mg total) by mouth once daily., Disp: 90 tablet, Rfl: 0    cholecalciferol, vitamin D3, (VITAMIN D3)  50 mcg (2,000 unit) Cap, Take 1 capsule by mouth once daily., Disp: , Rfl:     diclofenac sodium (VOLTAREN) 1 % Gel, Apply 2 g topically 2 (two) times daily as needed (pain)., Disp: 1 each, Rfl: 6    frovatriptan (FROVA) 2.5 MG tablet, Take 1 tablet (2.5 mg total) by mouth as needed for Migraine. If recurs, may repeat once after 2 hours. Don't exceed 3 per week., Disp: 36 tablet, Rfl: 1    metoprolol tartrate (LOPRESSOR) 25 MG tablet, TAKE 1 TABLET TWICE A DAY, Disp: 180 tablet, Rfl: 1    venlafaxine (EFFEXOR-XR) 150 MG Cp24, Take 1 capsule (150 mg total) by mouth once daily., Disp: 90 capsule, Rfl: 0    venlafaxine (EFFEXOR-XR) 75 MG 24 hr capsule, TAKE 1 CAPSULE DAILY, ALONG WITH 150 MG CAPSULE FOR TOTAL DAILY DOSE  MG, Disp: 90 capsule, Rfl: 0    Current Facility-Administered Medications:     triamcinolone acetonide injection 10 mg, 10 mg, Intradermal, Once, Kelsy Beckett MD     Review of patient's allergies indicates:   Allergen Reactions    Aspirin Other (See Comments)     Causes bruising          Social History     Socioeconomic History    Marital status: Single   Tobacco Use    Smoking status: Never    Smokeless tobacco: Never   Substance and Sexual Activity    Alcohol use: No    Drug use: No    Sexual activity: Never     Social Determinants of Health     Financial Resource Strain: Low Risk  (8/25/2023)    Overall Financial Resource Strain (CARDIA)     Difficulty of Paying Living Expenses: Not hard at all   Food Insecurity: No Food Insecurity (8/25/2023)    Hunger Vital Sign     Worried About Running Out of Food in the Last Year: Never true     Ran Out of Food in the Last Year: Never true   Transportation Needs: No Transportation Needs (8/25/2023)    PRAPARE - Transportation     Lack of Transportation (Medical): No     Lack of Transportation (Non-Medical): No   Physical Activity: Sufficiently Active (8/25/2023)    Exercise Vital Sign     Days of Exercise per Week: 3 days     Minutes of Exercise per  Session: 60 min   Stress: No Stress Concern Present (8/25/2023)    [employer] - Occupational Stress Questionnaire     Feeling of Stress : Not at all   Social Connections: Unknown (8/25/2023)    Social Connection and Isolation Panel [NHANES]     Frequency of Communication with Friends and Family: More than three times a week     Frequency of Social Gatherings with Friends and Family: More than three times a week     Attends Club or Organization Meetings: Never     Marital Status: Never    Housing Stability: Low Risk  (8/25/2023)    Housing Stability Vital Sign     Unable to Pay for Housing in the Last Year: No     Number of Places Lived in the Last Year: 1     Unstable Housing in the Last Year: No        Family History   Problem Relation Age of Onset    Macular degeneration Mother     Blindness Mother     Diabetes Mother     Hypertension Mother     Glaucoma Father     Blindness Father     Cancer Father     Amblyopia Neg Hx     Cataracts Neg Hx     Retinal detachment Neg Hx     Strabismus Neg Hx     Stroke Neg Hx     Thyroid disease Neg Hx     Melanoma Neg Hx         Review of Systems   All other systems reviewed and are negative.       Physical Exam  Vitals reviewed.   Constitutional:       General: She is not in acute distress.     Appearance: Normal appearance. She is not ill-appearing, toxic-appearing or diaphoretic.   HENT:      Head: Normocephalic and atraumatic.      Right Ear: External ear normal.      Left Ear: External ear normal.      Nose: Nose normal.   Eyes:      Extraocular Movements: Extraocular movements intact.      Conjunctiva/sclera: Conjunctivae normal.   Cardiovascular:      Rate and Rhythm: Normal rate and regular rhythm.      Pulses: Normal pulses.      Heart sounds: Normal heart sounds. No murmur heard.     No friction rub. No gallop.   Pulmonary:      Effort: Pulmonary effort is normal. No respiratory distress.      Breath sounds: Normal breath sounds. No  stridor. No wheezing, rhonchi or rales.   Musculoskeletal:      Right lower leg: No edema.      Left lower leg: No edema.   Skin:     General: Skin is warm and dry.   Neurological:      General: No focal deficit present.      Mental Status: She is alert and oriented to person, place, and time. Mental status is at baseline.   Psychiatric:         Mood and Affect: Mood normal.         Behavior: Behavior normal.         Thought Content: Thought content normal.         Judgment: Judgment normal.          Maddy was seen today for pre-op exam.    Diagnoses and all orders for this visit:    Preoperative examination  -     CBC Auto Differential; Future  -     Comprehensive Metabolic Panel; Future  -     EKG 12-lead; Future    Prediabetes  Comments:  Rec low carb/low sugar diet and regular exercise. Cont to monitor    Essential hypertension  Comments:  Continue current medications    Chronic nonintractable headache, unspecified headache type  Comments:  Continue current medications    Osteopenia, unspecified location  Comments:  Continue calcium, vitamin-D, and exercise.    Hyperlipidemia, unspecified hyperlipidemia type  Comments:  Continue current medication         Preoperative examination: EKG and labs WNL. EKG unchanged when compared to prior. Patient is low risk for any adverse events occurring during this low risk surgical procedure.     RTC PRN

## 2024-01-31 LAB
BASOPHILS # BLD AUTO: 0.06 K/UL (ref 0–0.2)
BASOPHILS NFR BLD: 0.8 % (ref 0–1.9)
DIFFERENTIAL METHOD BLD: ABNORMAL
EOSINOPHIL # BLD AUTO: 0.2 K/UL (ref 0–0.5)
EOSINOPHIL NFR BLD: 2.1 % (ref 0–8)
ERYTHROCYTE [DISTWIDTH] IN BLOOD BY AUTOMATED COUNT: 15.2 % (ref 11.5–14.5)
HCT VFR BLD AUTO: 39.5 % (ref 37–48.5)
HGB BLD-MCNC: 12.6 G/DL (ref 12–16)
IMM GRANULOCYTES # BLD AUTO: 0.01 K/UL (ref 0–0.04)
IMM GRANULOCYTES NFR BLD AUTO: 0.1 % (ref 0–0.5)
LYMPHOCYTES # BLD AUTO: 2.1 K/UL (ref 1–4.8)
LYMPHOCYTES NFR BLD: 27.9 % (ref 18–48)
MCH RBC QN AUTO: 29 PG (ref 27–31)
MCHC RBC AUTO-ENTMCNC: 31.9 G/DL (ref 32–36)
MCV RBC AUTO: 91 FL (ref 82–98)
MONOCYTES # BLD AUTO: 0.6 K/UL (ref 0.3–1)
MONOCYTES NFR BLD: 7.4 % (ref 4–15)
NEUTROPHILS # BLD AUTO: 4.6 K/UL (ref 1.8–7.7)
NEUTROPHILS NFR BLD: 61.7 % (ref 38–73)
NRBC BLD-RTO: 0 /100 WBC
PLATELET # BLD AUTO: 244 K/UL (ref 150–450)
PMV BLD AUTO: 12.8 FL (ref 9.2–12.9)
RBC # BLD AUTO: 4.35 M/UL (ref 4–5.4)
WBC # BLD AUTO: 7.52 K/UL (ref 3.9–12.7)

## 2024-02-07 ENCOUNTER — TELEPHONE (OUTPATIENT)
Dept: INTERNAL MEDICINE | Facility: CLINIC | Age: 74
End: 2024-02-07
Payer: MEDICARE

## 2024-02-07 NOTE — TELEPHONE ENCOUNTER
----- Message from Alfreda Damian sent at 2/7/2024 11:59 AM CST -----  .Type:  Test Results    Who Called:  pt  Name of Test (Lab/Mammo/Etc): LAB  Date of Test: 1/30/24  Ordering Provider:   Where the test was performed:   Would the patient rather a call back or a response via [facility]?   Best Call Back Number:   Additional Information:   [phone]

## 2024-03-04 ENCOUNTER — TELEPHONE (OUTPATIENT)
Dept: INTERNAL MEDICINE | Facility: CLINIC | Age: 74
End: 2024-03-04
Payer: MEDICARE

## 2024-03-04 NOTE — TELEPHONE ENCOUNTER
----- Message from Paris Ramsay sent at 3/4/2024  8:16 AM CST -----  Type:  Sooner Appointment Request    Caller is requesting a sooner appointment.  Caller declined first available appointment listed below.  Caller will not accept being placed on the waitlist and is requesting a message be sent to doctor.  Name of Caller:pt   When is the first available appointment?  Symptoms:Reschedule 03/28 appointment  Would the patient rather a call back or a response [facility]? Call   Best Call Back Number:[phone]  Additional Information:

## 2024-04-19 ENCOUNTER — LAB VISIT (OUTPATIENT)
Dept: LAB | Facility: HOSPITAL | Age: 74
End: 2024-04-19
Attending: INTERNAL MEDICINE
Payer: MEDICARE

## 2024-04-19 DIAGNOSIS — M85.80 OSTEOPENIA, UNSPECIFIED LOCATION: ICD-10-CM

## 2024-04-19 DIAGNOSIS — I10 ESSENTIAL HYPERTENSION: ICD-10-CM

## 2024-04-19 DIAGNOSIS — R73.03 PREDIABETES: ICD-10-CM

## 2024-04-19 DIAGNOSIS — E78.5 HYPERLIPIDEMIA, UNSPECIFIED HYPERLIPIDEMIA TYPE: ICD-10-CM

## 2024-04-19 LAB
25(OH)D3+25(OH)D2 SERPL-MCNC: 70 NG/ML (ref 30–96)
ALBUMIN SERPL BCP-MCNC: 3.8 G/DL (ref 3.5–5.2)
ALP SERPL-CCNC: 102 U/L (ref 55–135)
ALT SERPL W/O P-5'-P-CCNC: 14 U/L (ref 10–44)
ANION GAP SERPL CALC-SCNC: 8 MMOL/L (ref 8–16)
AST SERPL-CCNC: 22 U/L (ref 10–40)
BASOPHILS # BLD AUTO: 0.06 K/UL (ref 0–0.2)
BASOPHILS NFR BLD: 1 % (ref 0–1.9)
BILIRUB SERPL-MCNC: 0.5 MG/DL (ref 0.1–1)
BUN SERPL-MCNC: 16 MG/DL (ref 8–23)
CALCIUM SERPL-MCNC: 9.7 MG/DL (ref 8.7–10.5)
CHLORIDE SERPL-SCNC: 105 MMOL/L (ref 95–110)
CHOLEST SERPL-MCNC: 197 MG/DL (ref 120–199)
CHOLEST/HDLC SERPL: 4.2 {RATIO} (ref 2–5)
CO2 SERPL-SCNC: 24 MMOL/L (ref 23–29)
CREAT SERPL-MCNC: 1.6 MG/DL (ref 0.5–1.4)
DIFFERENTIAL METHOD BLD: ABNORMAL
EOSINOPHIL # BLD AUTO: 0.1 K/UL (ref 0–0.5)
EOSINOPHIL NFR BLD: 1.6 % (ref 0–8)
ERYTHROCYTE [DISTWIDTH] IN BLOOD BY AUTOMATED COUNT: 15.3 % (ref 11.5–14.5)
EST. GFR  (NO RACE VARIABLE): 34.3 ML/MIN/1.73 M^2
ESTIMATED AVG GLUCOSE: 128 MG/DL (ref 68–131)
GLUCOSE SERPL-MCNC: 66 MG/DL (ref 70–110)
HBA1C MFR BLD: 6.1 % (ref 4–5.6)
HCT VFR BLD AUTO: 40.7 % (ref 37–48.5)
HDLC SERPL-MCNC: 47 MG/DL (ref 40–75)
HDLC SERPL: 23.9 % (ref 20–50)
HGB BLD-MCNC: 12.7 G/DL (ref 12–16)
IMM GRANULOCYTES # BLD AUTO: 0.01 K/UL (ref 0–0.04)
IMM GRANULOCYTES NFR BLD AUTO: 0.2 % (ref 0–0.5)
LDLC SERPL CALC-MCNC: 132 MG/DL (ref 63–159)
LYMPHOCYTES # BLD AUTO: 1.8 K/UL (ref 1–4.8)
LYMPHOCYTES NFR BLD: 29.4 % (ref 18–48)
MCH RBC QN AUTO: 29.5 PG (ref 27–31)
MCHC RBC AUTO-ENTMCNC: 31.2 G/DL (ref 32–36)
MCV RBC AUTO: 94 FL (ref 82–98)
MONOCYTES # BLD AUTO: 0.5 K/UL (ref 0.3–1)
MONOCYTES NFR BLD: 8.6 % (ref 4–15)
NEUTROPHILS # BLD AUTO: 3.7 K/UL (ref 1.8–7.7)
NEUTROPHILS NFR BLD: 59.2 % (ref 38–73)
NONHDLC SERPL-MCNC: 150 MG/DL
NRBC BLD-RTO: 0 /100 WBC
PLATELET # BLD AUTO: 273 K/UL (ref 150–450)
PMV BLD AUTO: 11.4 FL (ref 9.2–12.9)
POTASSIUM SERPL-SCNC: 4.3 MMOL/L (ref 3.5–5.1)
PROT SERPL-MCNC: 7 G/DL (ref 6–8.4)
RBC # BLD AUTO: 4.31 M/UL (ref 4–5.4)
SODIUM SERPL-SCNC: 137 MMOL/L (ref 136–145)
TRIGL SERPL-MCNC: 90 MG/DL (ref 30–150)
WBC # BLD AUTO: 6.25 K/UL (ref 3.9–12.7)

## 2024-04-19 PROCEDURE — 85025 COMPLETE CBC W/AUTO DIFF WBC: CPT | Performed by: INTERNAL MEDICINE

## 2024-04-19 PROCEDURE — 80053 COMPREHEN METABOLIC PANEL: CPT | Performed by: INTERNAL MEDICINE

## 2024-04-19 PROCEDURE — 82306 VITAMIN D 25 HYDROXY: CPT | Performed by: INTERNAL MEDICINE

## 2024-04-19 PROCEDURE — 83036 HEMOGLOBIN GLYCOSYLATED A1C: CPT | Performed by: INTERNAL MEDICINE

## 2024-04-19 PROCEDURE — 80061 LIPID PANEL: CPT | Performed by: INTERNAL MEDICINE

## 2024-04-19 PROCEDURE — 36415 COLL VENOUS BLD VENIPUNCTURE: CPT | Mod: PO | Performed by: INTERNAL MEDICINE

## 2024-04-24 ENCOUNTER — OFFICE VISIT (OUTPATIENT)
Dept: INTERNAL MEDICINE | Facility: CLINIC | Age: 74
End: 2024-04-24
Payer: MEDICARE

## 2024-04-24 VITALS
OXYGEN SATURATION: 98 % | HEIGHT: 64 IN | HEART RATE: 78 BPM | SYSTOLIC BLOOD PRESSURE: 112 MMHG | WEIGHT: 137.56 LBS | DIASTOLIC BLOOD PRESSURE: 50 MMHG | BODY MASS INDEX: 23.49 KG/M2

## 2024-04-24 DIAGNOSIS — E55.9 VITAMIN D INSUFFICIENCY: ICD-10-CM

## 2024-04-24 DIAGNOSIS — R73.03 PREDIABETES: ICD-10-CM

## 2024-04-24 DIAGNOSIS — K59.00 CONSTIPATION, UNSPECIFIED CONSTIPATION TYPE: ICD-10-CM

## 2024-04-24 DIAGNOSIS — I10 ESSENTIAL HYPERTENSION: ICD-10-CM

## 2024-04-24 DIAGNOSIS — E78.5 HYPERLIPIDEMIA, UNSPECIFIED HYPERLIPIDEMIA TYPE: ICD-10-CM

## 2024-04-24 DIAGNOSIS — N17.9 ACUTE KIDNEY INJURY: Primary | ICD-10-CM

## 2024-04-24 PROCEDURE — 99999 PR PBB SHADOW E&M-EST. PATIENT-LVL IV: CPT | Mod: PBBFAC,,, | Performed by: INTERNAL MEDICINE

## 2024-04-24 PROCEDURE — 99214 OFFICE O/P EST MOD 30 MIN: CPT | Mod: S$PBB,,, | Performed by: INTERNAL MEDICINE

## 2024-04-24 PROCEDURE — 99214 OFFICE O/P EST MOD 30 MIN: CPT | Mod: PBBFAC,PO | Performed by: INTERNAL MEDICINE

## 2024-04-24 NOTE — PROGRESS NOTES
Patient ID: Maddy Lopez is a 73 y.o. female.    Chief Complaint: Follow-up    HPI Maddy is a 73 y.o. female with prediabetes, HTN, HLD a chronic headache disorder, osteopenia and eczema  who presents for routine follow up of medical conditions. Reports passing small hard lumps of stool. No further acute complaints or concerns.   Reviewed lab results from 4/19/24. She denies excessive NSAID use. But says she has had kidney issues due to excessive NSAID use in the past.    Health Maintenance Topics with due status: Not Due       Topic Last Completion Date    DEXA Scan 02/11/2022    Mammogram 10/20/2023    Colorectal Cancer Screening 10/25/2023    Hemoglobin A1c (Prediabetes) 04/19/2024    Lipid Panel 04/19/2024        Review of Systems   Gastrointestinal:         See HPI   All other systems reviewed and are negative.      Objective:     Vitals:    04/24/24 1021   BP: (!) 112/50   Pulse: 78        Physical Exam  Vitals reviewed.   Constitutional:       General: She is not in acute distress.     Appearance: Normal appearance. She is well-developed. She is not ill-appearing, toxic-appearing or diaphoretic.   HENT:      Head: Normocephalic and atraumatic.      Right Ear: External ear normal.      Left Ear: External ear normal.      Nose: Nose normal.   Eyes:      General: No scleral icterus.        Right eye: No discharge.         Left eye: No discharge.      Extraocular Movements: Extraocular movements intact.      Conjunctiva/sclera: Conjunctivae normal.   Cardiovascular:      Rate and Rhythm: Normal rate and regular rhythm.      Heart sounds: Normal heart sounds. No murmur heard.     No friction rub. No gallop.   Pulmonary:      Effort: Pulmonary effort is normal. No respiratory distress.      Breath sounds: Normal breath sounds. No stridor. No wheezing, rhonchi or rales.   Skin:     General: Skin is warm and dry.   Neurological:      General: No focal deficit present.      Mental Status: She is alert and  oriented to person, place, and time. Mental status is at baseline.   Psychiatric:         Mood and Affect: Mood normal.         Behavior: Behavior normal.         Thought Content: Thought content normal.         Judgment: Judgment normal.         Assessment:       1. Acute kidney injury Active   2. Prediabetes Chronic   3. Hyperlipidemia, unspecified hyperlipidemia type Chronic   4. Essential hypertension Well controlled   5. Vitamin D insufficiency Well controlled   6. Constipation, unspecified constipation type Active       Plan:         Acute kidney injury  Comments:  See AVS  Orders:  -     BASIC METABOLIC PANEL; Future; Expected date: 04/24/2024    Prediabetes  Comments:  Recommend low sugar/low carb diet and regular exercise  Orders:  -     Comprehensive Metabolic Panel; Future; Expected date: 04/24/2024  -     Hemoglobin A1C; Future; Expected date: 04/24/2024    Hyperlipidemia, unspecified hyperlipidemia type  Comments:  Continue statin  Orders:  -     Lipid Panel; Future; Expected date: 04/24/2024    Essential hypertension  Comments:  continue current medication  Orders:  -     CBC Auto Differential; Future; Expected date: 04/24/2024    Vitamin D insufficiency  Comments:  continue current supplement  Orders:  -     Vitamin D; Future; Expected date: 04/24/2024    Constipation, unspecified constipation type  Comments:  See AVS      BMP in 2-4 weeks    RTC 6 months       Warning signs discussed, patient to call with any further issues or worsening of symptoms.       Parts of the above note were dictated using a voice dictation software. Please excuse any grammatical or typographical errors.

## 2024-04-24 NOTE — PATIENT INSTRUCTIONS
Acute kidney injury:  Hydrate well with water. I want you to drink 2 L of water per day  Avoid NSAIDs (ibuprofen, aleve, naproxen, motrin, mobic, Advil and Goodys)  It is ok to take prescription medication and over the counter tylenol.   We will recheck kidney function in approx 2-4 weeks. If no improvement, may need to consider nephrology referral     Consider getting RSV, tetanus and flu shots at local pharmacy.     Constipation: Drink at least 6 eight ounce glasses of water per day. Take one tablespoon of metamucil in 8 ounces of water daily. Use one capful of miralax in 8 ounces of water daily. You may titrate your miralax dose up or down as needed to achieve your goal. Your goal is to have one soft formed stool daily.

## 2024-04-27 ENCOUNTER — PATIENT MESSAGE (OUTPATIENT)
Dept: INTERNAL MEDICINE | Facility: CLINIC | Age: 74
End: 2024-04-27
Payer: MEDICARE

## 2024-05-09 DIAGNOSIS — G43.909 MIGRAINE WITHOUT STATUS MIGRAINOSUS, NOT INTRACTABLE, UNSPECIFIED MIGRAINE TYPE: ICD-10-CM

## 2024-05-09 DIAGNOSIS — E78.5 HYPERLIPIDEMIA, UNSPECIFIED HYPERLIPIDEMIA TYPE: ICD-10-CM

## 2024-05-09 DIAGNOSIS — G89.29 CHRONIC NONINTRACTABLE HEADACHE, UNSPECIFIED HEADACHE TYPE: ICD-10-CM

## 2024-05-09 DIAGNOSIS — R51.9 CHRONIC NONINTRACTABLE HEADACHE, UNSPECIFIED HEADACHE TYPE: ICD-10-CM

## 2024-05-09 RX ORDER — VENLAFAXINE HYDROCHLORIDE 75 MG/1
CAPSULE, EXTENDED RELEASE ORAL
Qty: 90 CAPSULE | Refills: 0 | Status: SHIPPED | OUTPATIENT
Start: 2024-05-09

## 2024-05-09 RX ORDER — METOPROLOL TARTRATE 25 MG/1
25 TABLET, FILM COATED ORAL 2 TIMES DAILY
Qty: 180 TABLET | Refills: 1 | Status: SHIPPED | OUTPATIENT
Start: 2024-05-09

## 2024-05-09 RX ORDER — ATORVASTATIN CALCIUM 40 MG/1
40 TABLET, FILM COATED ORAL DAILY
Qty: 90 TABLET | Refills: 0 | Status: SHIPPED | OUTPATIENT
Start: 2024-05-09

## 2024-05-09 RX ORDER — VENLAFAXINE HYDROCHLORIDE 150 MG/1
150 CAPSULE, EXTENDED RELEASE ORAL DAILY
Qty: 90 CAPSULE | Refills: 0 | Status: SHIPPED | OUTPATIENT
Start: 2024-05-09

## 2024-05-09 NOTE — TELEPHONE ENCOUNTER
No care due was identified.  [facility] Embedded Care Due Messages. Reference number: [id number].   5/09/2024 3:06:37 PM CDT

## 2024-05-09 NOTE — TELEPHONE ENCOUNTER
----- Message from Ashely Diehl sent at 5/9/2024  2:18 PM CDT -----  Type:  RX Refill Request    Who Called: pt  Refill or New Rx:refill  RX Name and Strength:venlafaxine (EFFEXOR-XR) 150 MG Cp24  How is the patient currently taking it? (ex. 1XDay):Take 1 capsule (150 mg total) by mouth once daily  Is this a 30 day or 90 day RX:90  Preferred Pharmacy with phone number:[facility] - [address]   Phone: [phone]  Fax: [phone]  Local or Mail Order:mail  Ordering Provider:DR Boyce  Would the patient rather a call back or a response via [facility]? call  Best Call Back Number:[phone]  Additional Information:         Type:  RX Refill Request    Who Called: pt  Refill or New Rx:refill  RX Name and Strength:venlafaxine (EFFEXOR-XR) 75 MG 24 hr capsule  How is the patient currently taking it? (ex. 1XDay):TAKE 1 CAPSULE DAILY, ALONG WITH 150 MG CAPSULE FOR TOTAL DAILY DOSE  MG  Is this a 30 day or 90 day RX:90    Type:  RX Refill Request    Who Called: pt  Refill or New Rx:refill  RX Name and Strength:metoprolol tartrate (LOPRESSOR) 25 MG tablet  How is the patient currently taking it? (ex. 1XDay):TAKE 1 TABLET TWICE A DAY -   Is this a 30 day or 90 day RX:180    Type:  RX Refill Request    Who Called: pt  Refill or New Rx:refill  RX Name and Strength:atorvastatin (LIPITOR) 40 MG tablet  How is the patient currently taking it? (ex. 1XDay): Take 1 tablet (40 mg total) by mouth once daily.  Is this a 30 day or 90 day RX:90

## 2024-05-13 DIAGNOSIS — M79.644 THUMB PAIN, RIGHT: ICD-10-CM

## 2024-05-13 RX ORDER — DICLOFENAC SODIUM 10 MG/G
2 GEL TOPICAL 2 TIMES DAILY PRN
Qty: 1 EACH | Refills: 6 | Status: SHIPPED | OUTPATIENT
Start: 2024-05-13

## 2024-05-13 NOTE — TELEPHONE ENCOUNTER
----- Message from Elizabeth Carroll sent at 5/13/2024  9:10 AM CDT -----  Type:  RX Refill Request    Who Called: Pt  Refill or New Rx: Refill  RX Name and Strength:diclofenac sodium (VOLTAREN) 1 % Gel  Preferred Pharmacy with phone number:[facility] - [address]  Local or Mail Order: Mail  Ordering Provider:Carli  Would the patient rather a call back or a response via [facility]? Call  Best Call Back Number: [phone]  Additional Information:

## 2024-05-14 ENCOUNTER — PATIENT MESSAGE (OUTPATIENT)
Dept: INTERNAL MEDICINE | Facility: CLINIC | Age: 74
End: 2024-05-14
Payer: MEDICARE

## 2024-05-20 ENCOUNTER — LAB VISIT (OUTPATIENT)
Dept: LAB | Facility: HOSPITAL | Age: 74
End: 2024-05-20
Attending: INTERNAL MEDICINE
Payer: MEDICARE

## 2024-05-20 DIAGNOSIS — N17.9 ACUTE KIDNEY INJURY: ICD-10-CM

## 2024-05-20 LAB
ANION GAP SERPL CALC-SCNC: 7 MMOL/L (ref 8–16)
BUN SERPL-MCNC: 9 MG/DL (ref 8–23)
CALCIUM SERPL-MCNC: 9.6 MG/DL (ref 8.7–10.5)
CHLORIDE SERPL-SCNC: 106 MMOL/L (ref 95–110)
CO2 SERPL-SCNC: 25 MMOL/L (ref 23–29)
CREAT SERPL-MCNC: 1 MG/DL (ref 0.5–1.4)
EST. GFR  (NO RACE VARIABLE): >60 ML/MIN/1.73 M^2
GLUCOSE SERPL-MCNC: 115 MG/DL (ref 70–110)
POTASSIUM SERPL-SCNC: 3.8 MMOL/L (ref 3.5–5.1)
SODIUM SERPL-SCNC: 138 MMOL/L (ref 136–145)

## 2024-05-20 PROCEDURE — 36415 COLL VENOUS BLD VENIPUNCTURE: CPT | Mod: PO | Performed by: INTERNAL MEDICINE

## 2024-05-20 PROCEDURE — 80048 BASIC METABOLIC PNL TOTAL CA: CPT | Performed by: INTERNAL MEDICINE

## 2024-05-23 ENCOUNTER — PATIENT MESSAGE (OUTPATIENT)
Dept: INTERNAL MEDICINE | Facility: CLINIC | Age: 74
End: 2024-05-23
Payer: MEDICARE

## 2024-05-24 ENCOUNTER — PATIENT MESSAGE (OUTPATIENT)
Dept: INTERNAL MEDICINE | Facility: CLINIC | Age: 74
End: 2024-05-24
Payer: MEDICARE

## 2024-06-04 ENCOUNTER — OFFICE VISIT (OUTPATIENT)
Dept: OBSTETRICS AND GYNECOLOGY | Facility: CLINIC | Age: 74
End: 2024-06-04
Payer: MEDICARE

## 2024-06-04 VITALS — BODY MASS INDEX: 24.65 KG/M2 | HEIGHT: 64 IN | WEIGHT: 144.38 LBS

## 2024-06-04 DIAGNOSIS — D21.9 FIBROID: Primary | ICD-10-CM

## 2024-06-04 PROCEDURE — 99203 OFFICE O/P NEW LOW 30 MIN: CPT | Mod: S$PBB,,, | Performed by: STUDENT IN AN ORGANIZED HEALTH CARE EDUCATION/TRAINING PROGRAM

## 2024-06-04 PROCEDURE — 99213 OFFICE O/P EST LOW 20 MIN: CPT | Mod: PBBFAC,PO | Performed by: STUDENT IN AN ORGANIZED HEALTH CARE EDUCATION/TRAINING PROGRAM

## 2024-06-04 PROCEDURE — 99999 PR PBB SHADOW E&M-EST. PATIENT-LVL III: CPT | Mod: PBBFAC,,, | Performed by: STUDENT IN AN ORGANIZED HEALTH CARE EDUCATION/TRAINING PROGRAM

## 2024-06-04 NOTE — PROGRESS NOTES
CC: Gynecologic Exam    HPI:  Maddy Lopez is a 73 y.o. female No obstetric history on file. presents with complaint of fibroid found on Xray. Patient recently had a fall, got Xray of hips done and found to have calcified fibroid. She has no symptoms at all - no pressure sensation, no pelvic pain, no vaginal bleeding.    ROS:  GENERAL: No fever, chills, fatigability or weight loss.  VULVAR: No pain, no lesions and no itching.  VAGINAL: No relaxation, no itching, no discharge, no abnormal bleeding and no lesions.  ABDOMEN: No abdominal pain. Denies nausea. Denies vomiting. No diarrhea. No constipation  BREAST: Denies pain. No lumps. No discharge.  URINARY: No incontinence, no nocturia, no frequency and no dysuria.  CARDIOVASCULAR: No chest pain. No shortness of breath. No leg cramps.  NEUROLOGICAL: No headaches. No vision changes.      Patient History:  Past Medical History:   Diagnosis Date    Arthritis     Fever blister     Hyperlipidemia     Hypertension     Migraine headache     Prediabetes 6/16/2020     Past Surgical History:   Procedure Laterality Date    COLONOSCOPY N/A 10/24/2018    Procedure: COLONOSCOPY/Golytely;  Surgeon: Natty Bradshaw MD;  Location: [facility] ENDO;  Service: Endoscopy;  Laterality: N/A;    COLONOSCOPY N/A 10/25/2023    Procedure: COLONOSCOPY;  Surgeon: Juaquin Silva MD;  Location: [facility] ENDO;  Service: Endoscopy;  Laterality: N/A;    LEVATOR DEHISCENCE Bilateral 2013    TRANSFORAMINAL EPIDURAL INJECTION OF STEROID Right 11/3/2022    Procedure: Injection,steroid,epidural,transforaminal right L4-5 and L5-S1;  Surgeon: Deep Pace Jr., MD;  Location: [facility] PAIN MGT;  Service: Pain Management;  Laterality: Right;     Social History     Tobacco Use    Smoking status: Never    Smokeless tobacco: Never   Substance Use Topics    Alcohol use: No    Drug use: No     Family History   Problem Relation Name Age of Onset    Macular degeneration Mother      Blindness Mother    "   Diabetes Mother      Hypertension Mother      Glaucoma Father      Blindness Father      Cancer Father      Amblyopia Neg Hx      Cataracts Neg Hx      Retinal detachment Neg Hx      Strabismus Neg Hx      Stroke Neg Hx      Thyroid disease Neg Hx      Melanoma Neg Hx       OB History   No obstetric history on file.       Objective:   Ht 5' 4" (1.626 m)   Wt 65.5 kg (144 lb 6.4 oz)   LMP  (LMP Unknown)   BMI 24.79 kg/m²   No LMP recorded (lmp unknown). Patient is postmenopausal.      PHYSICAL EXAM:  APPEARANCE: Well nourished, well developed, in no acute distress.  AFFECT: WNL, alert and oriented x 3  SKIN: No acne or hirsutism  NECK: Neck symmetric without masses or thyromegaly  NODES: No inguinal, cervical, axillary, or femoral lymph node enlargement  CHEST: Good respiratory effect  PELVIC: Normal external genitalia without lesions.  Normal hair distribution.  small perineal body, normal urethral meatus.  Vagina atrophic without lesions or discharge.  Cervix unable to be visualized due to patient discomfort with speculum exam.  No significant cystocele or rectocele.  Bimanual exam uterus with possible anterior or fundal fibroid, nontender.  Adnexa without masses or tenderness.  EXTREMITIES: No edema.      ASSESSMENT and PLAN:    ICD-10-CM ICD-9-CM    1. Fibroid  D21.9 215.9           Fibroid   - reviewed imaging and discussed fibroid with patient   - counseled patient that fibroids are benign, do not have to do anything unless she is symptomatic   - if symptoms develop will get US to monitor     Follow up: as needed or 1 year WWE Stephanie Heaney, MD OBGYN [facility]       "

## 2024-06-12 ENCOUNTER — OFFICE VISIT (OUTPATIENT)
Dept: FAMILY MEDICINE | Facility: CLINIC | Age: 74
End: 2024-06-12
Payer: MEDICARE

## 2024-06-12 VITALS
DIASTOLIC BLOOD PRESSURE: 76 MMHG | TEMPERATURE: 98 F | HEART RATE: 83 BPM | HEIGHT: 64 IN | OXYGEN SATURATION: 100 % | SYSTOLIC BLOOD PRESSURE: 124 MMHG | WEIGHT: 142 LBS | BODY MASS INDEX: 24.24 KG/M2

## 2024-06-12 DIAGNOSIS — M25.552 LEFT HIP PAIN: Primary | ICD-10-CM

## 2024-06-12 PROCEDURE — 99999 PR PBB SHADOW E&M-EST. PATIENT-LVL IV: CPT | Mod: PBBFAC,,, | Performed by: STUDENT IN AN ORGANIZED HEALTH CARE EDUCATION/TRAINING PROGRAM

## 2024-06-12 PROCEDURE — 99214 OFFICE O/P EST MOD 30 MIN: CPT | Mod: S$PBB,,, | Performed by: STUDENT IN AN ORGANIZED HEALTH CARE EDUCATION/TRAINING PROGRAM

## 2024-06-12 PROCEDURE — 99214 OFFICE O/P EST MOD 30 MIN: CPT | Mod: PBBFAC,PN | Performed by: STUDENT IN AN ORGANIZED HEALTH CARE EDUCATION/TRAINING PROGRAM

## 2024-06-12 RX ORDER — MELOXICAM 15 MG/1
15 TABLET ORAL DAILY
Qty: 30 TABLET | Refills: 1 | Status: SHIPPED | OUTPATIENT
Start: 2024-06-12

## 2024-06-12 NOTE — PROGRESS NOTES
Subjective:      Patient ID: Maddy Lopez is a 73 y.o. female.    Chief Complaint: Hip Pain (Pt had a fall three weeks ago and is here due to still having left hip pain. Pt informed me that she fall out of her bed unto the floor )    Left hip pain  Fall 2-3 weeks ago   Went to  given shot and medications for pain; completed these  Getting better daily but pain continues and walking is still difficult  She is not currently taking medications at all  Worried that something else might be going on  Wants to know if is safe to resume water aerobics   Pain with palpation to mid shaft sometimes radiates to the groin       Review of Systems   All other systems reviewed and are negative.       Objective:     Vitals:    06/12/24 1309   BP: 124/76   Pulse: 83   Temp: 97.7 °F (36.5 °C)      Physical Exam  Constitutional:       Appearance: Normal appearance.   HENT:      Head: Atraumatic.   Eyes:      Conjunctiva/sclera: Conjunctivae normal.   Pulmonary:      Effort: Pulmonary effort is normal.   Musculoskeletal:      Left hip: Tenderness and bony tenderness present. Decreased range of motion.        Legs:    Neurological:      General: No focal deficit present.      Mental Status: She is alert and oriented to person, place, and time.   Psychiatric:         Mood and Affect: Mood normal.         Behavior: Behavior normal.        Assessment:         1. Left hip pain          Plan:   1. Left hip pain  - meloxicam (MOBIC) 15 MG tablet; Take 1 tablet (15 mg total) by mouth once daily.  Dispense: 30 tablet; Refill: 1  - X-Ray Hip 2 or 3 views Left with Pelvis when performed; Future     Xray today   Mobic for pain  REST; ice/heat; elevation; lidocaine patches  RTC PRN           Davina Delgadosfernando [facility] Medicine   6/12/24

## 2024-06-19 ENCOUNTER — PATIENT MESSAGE (OUTPATIENT)
Dept: NEUROLOGY | Facility: CLINIC | Age: 74
End: 2024-06-19
Payer: MEDICARE

## 2024-06-24 ENCOUNTER — PATIENT OUTREACH (OUTPATIENT)
Dept: ADMINISTRATIVE | Facility: HOSPITAL | Age: 74
End: 2024-06-24
Payer: MEDICARE

## 2024-06-24 NOTE — PROGRESS NOTES
Population Health Chart Review & Patient Outreach Details      Additional [facility] Health Notes:               Updates Requested / Reviewed:      Updated Care Coordination Note, Care Everywhere, and Immunizations Reconciliation Completed or Queried: [facility] Topics Overdue:      [facility] Score: 0     Patient is not due for any topics at this time.    Tetanus Vaccine  RSV Vaccine                  Health Maintenance Topic(s) Outreach Outcomes & Actions Taken:    Provider Pt Reattribution - Outreach Outcomes & Actions Taken  : Scheduled Appt with Another Provider

## 2024-08-08 DIAGNOSIS — M25.552 LEFT HIP PAIN: ICD-10-CM

## 2024-08-09 RX ORDER — MELOXICAM 15 MG/1
TABLET ORAL
Qty: 30 TABLET | Refills: 1 | Status: SHIPPED | OUTPATIENT
Start: 2024-08-09

## 2024-08-23 ENCOUNTER — PATIENT MESSAGE (OUTPATIENT)
Dept: ADMINISTRATIVE | Facility: HOSPITAL | Age: 74
End: 2024-08-23
Payer: MEDICARE

## 2024-08-26 ENCOUNTER — PATIENT OUTREACH (OUTPATIENT)
Dept: ADMINISTRATIVE | Facility: HOSPITAL | Age: 74
End: 2024-08-26
Payer: MEDICARE

## 2024-09-26 ENCOUNTER — TELEPHONE (OUTPATIENT)
Dept: FAMILY MEDICINE | Facility: CLINIC | Age: 74
End: 2024-09-26
Payer: MEDICARE

## 2024-09-26 DIAGNOSIS — G89.29 CHRONIC NONINTRACTABLE HEADACHE, UNSPECIFIED HEADACHE TYPE: ICD-10-CM

## 2024-09-26 DIAGNOSIS — R51.9 CHRONIC NONINTRACTABLE HEADACHE, UNSPECIFIED HEADACHE TYPE: ICD-10-CM

## 2024-09-26 DIAGNOSIS — E78.5 HYPERLIPIDEMIA, UNSPECIFIED HYPERLIPIDEMIA TYPE: ICD-10-CM

## 2024-09-26 DIAGNOSIS — G43.909 MIGRAINE WITHOUT STATUS MIGRAINOSUS, NOT INTRACTABLE, UNSPECIFIED MIGRAINE TYPE: ICD-10-CM

## 2024-09-26 RX ORDER — VENLAFAXINE HYDROCHLORIDE 75 MG/1
CAPSULE, EXTENDED RELEASE ORAL
Qty: 90 CAPSULE | Refills: 0 | Status: SHIPPED | OUTPATIENT
Start: 2024-09-26

## 2024-09-26 RX ORDER — METOPROLOL TARTRATE 25 MG/1
25 TABLET, FILM COATED ORAL 2 TIMES DAILY
Qty: 180 TABLET | Refills: 0 | Status: SHIPPED | OUTPATIENT
Start: 2024-09-26

## 2024-09-26 RX ORDER — VENLAFAXINE HYDROCHLORIDE 150 MG/1
150 CAPSULE, EXTENDED RELEASE ORAL DAILY
Qty: 90 CAPSULE | Refills: 0 | Status: SHIPPED | OUTPATIENT
Start: 2024-09-26

## 2024-09-26 RX ORDER — ATORVASTATIN CALCIUM 40 MG/1
40 TABLET, FILM COATED ORAL DAILY
Qty: 90 TABLET | Refills: 0 | Status: SHIPPED | OUTPATIENT
Start: 2024-09-26

## 2024-10-04 DIAGNOSIS — Z12.31 SCREENING MAMMOGRAM FOR BREAST CANCER: Primary | ICD-10-CM

## 2024-10-21 ENCOUNTER — LAB VISIT (OUTPATIENT)
Dept: LAB | Facility: HOSPITAL | Age: 74
End: 2024-10-21
Attending: INTERNAL MEDICINE
Payer: MEDICARE

## 2024-10-21 DIAGNOSIS — R73.03 PREDIABETES: ICD-10-CM

## 2024-10-21 DIAGNOSIS — I10 ESSENTIAL HYPERTENSION: ICD-10-CM

## 2024-10-21 DIAGNOSIS — E78.5 HYPERLIPIDEMIA, UNSPECIFIED HYPERLIPIDEMIA TYPE: ICD-10-CM

## 2024-10-21 DIAGNOSIS — E55.9 VITAMIN D INSUFFICIENCY: ICD-10-CM

## 2024-10-21 LAB
25(OH)D3+25(OH)D2 SERPL-MCNC: 68 NG/ML (ref 30–96)
ALBUMIN SERPL BCP-MCNC: 4.1 G/DL (ref 3.5–5.2)
ALP SERPL-CCNC: 121 U/L (ref 40–150)
ALT SERPL W/O P-5'-P-CCNC: 18 U/L (ref 10–44)
ANION GAP SERPL CALC-SCNC: 9 MMOL/L (ref 8–16)
AST SERPL-CCNC: 23 U/L (ref 10–40)
BASOPHILS # BLD AUTO: 0.06 K/UL (ref 0–0.2)
BASOPHILS NFR BLD: 0.8 % (ref 0–1.9)
BILIRUB SERPL-MCNC: 0.3 MG/DL (ref 0.1–1)
BUN SERPL-MCNC: 11 MG/DL (ref 8–23)
CALCIUM SERPL-MCNC: 10 MG/DL (ref 8.7–10.5)
CHLORIDE SERPL-SCNC: 105 MMOL/L (ref 95–110)
CHOLEST SERPL-MCNC: 197 MG/DL (ref 120–199)
CHOLEST/HDLC SERPL: 4.7 {RATIO} (ref 2–5)
CO2 SERPL-SCNC: 24 MMOL/L (ref 23–29)
CREAT SERPL-MCNC: 1.1 MG/DL (ref 0.5–1.4)
DIFFERENTIAL METHOD BLD: ABNORMAL
EOSINOPHIL # BLD AUTO: 0.2 K/UL (ref 0–0.5)
EOSINOPHIL NFR BLD: 3.4 % (ref 0–8)
ERYTHROCYTE [DISTWIDTH] IN BLOOD BY AUTOMATED COUNT: 15 % (ref 11.5–14.5)
EST. GFR  (NO RACE VARIABLE): 53.4 ML/MIN/1.73 M^2
ESTIMATED AVG GLUCOSE: 134 MG/DL (ref 68–131)
GLUCOSE SERPL-MCNC: 92 MG/DL (ref 70–110)
HBA1C MFR BLD: 6.3 % (ref 4–5.6)
HCT VFR BLD AUTO: 40.3 % (ref 37–48.5)
HDLC SERPL-MCNC: 42 MG/DL (ref 40–75)
HDLC SERPL: 21.3 % (ref 20–50)
HGB BLD-MCNC: 12.5 G/DL (ref 12–16)
IMM GRANULOCYTES # BLD AUTO: 0.08 K/UL (ref 0–0.04)
IMM GRANULOCYTES NFR BLD AUTO: 1.1 % (ref 0–0.5)
LDLC SERPL CALC-MCNC: 123.2 MG/DL (ref 63–159)
LYMPHOCYTES # BLD AUTO: 1.6 K/UL (ref 1–4.8)
LYMPHOCYTES NFR BLD: 23.2 % (ref 18–48)
MCH RBC QN AUTO: 29.4 PG (ref 27–31)
MCHC RBC AUTO-ENTMCNC: 31 G/DL (ref 32–36)
MCV RBC AUTO: 95 FL (ref 82–98)
MONOCYTES # BLD AUTO: 0.5 K/UL (ref 0.3–1)
MONOCYTES NFR BLD: 7.6 % (ref 4–15)
NEUTROPHILS # BLD AUTO: 4.5 K/UL (ref 1.8–7.7)
NEUTROPHILS NFR BLD: 63.9 % (ref 38–73)
NONHDLC SERPL-MCNC: 155 MG/DL
NRBC BLD-RTO: 0 /100 WBC
PLATELET # BLD AUTO: 269 K/UL (ref 150–450)
PMV BLD AUTO: 11.7 FL (ref 9.2–12.9)
POTASSIUM SERPL-SCNC: 4.6 MMOL/L (ref 3.5–5.1)
PROT SERPL-MCNC: 7.2 G/DL (ref 6–8.4)
RBC # BLD AUTO: 4.25 M/UL (ref 4–5.4)
SODIUM SERPL-SCNC: 138 MMOL/L (ref 136–145)
TRIGL SERPL-MCNC: 159 MG/DL (ref 30–150)
WBC # BLD AUTO: 7.06 K/UL (ref 3.9–12.7)

## 2024-10-21 PROCEDURE — 80061 LIPID PANEL: CPT | Performed by: INTERNAL MEDICINE

## 2024-10-21 PROCEDURE — 83036 HEMOGLOBIN GLYCOSYLATED A1C: CPT | Performed by: INTERNAL MEDICINE

## 2024-10-21 PROCEDURE — 82306 VITAMIN D 25 HYDROXY: CPT | Performed by: INTERNAL MEDICINE

## 2024-10-21 PROCEDURE — 85025 COMPLETE CBC W/AUTO DIFF WBC: CPT | Performed by: INTERNAL MEDICINE

## 2024-10-21 PROCEDURE — 80053 COMPREHEN METABOLIC PANEL: CPT | Performed by: INTERNAL MEDICINE

## 2024-10-21 PROCEDURE — 36415 COLL VENOUS BLD VENIPUNCTURE: CPT | Mod: PO | Performed by: INTERNAL MEDICINE

## 2024-10-25 ENCOUNTER — OFFICE VISIT (OUTPATIENT)
Dept: FAMILY MEDICINE | Facility: CLINIC | Age: 74
End: 2024-10-25
Payer: MEDICARE

## 2024-10-25 VITALS
OXYGEN SATURATION: 97 % | BODY MASS INDEX: 24.46 KG/M2 | WEIGHT: 143.31 LBS | HEIGHT: 64 IN | DIASTOLIC BLOOD PRESSURE: 76 MMHG | SYSTOLIC BLOOD PRESSURE: 118 MMHG | HEART RATE: 88 BPM

## 2024-10-25 DIAGNOSIS — M19.041 PRIMARY OSTEOARTHRITIS OF BOTH HANDS: ICD-10-CM

## 2024-10-25 DIAGNOSIS — R51.9 CHRONIC NONINTRACTABLE HEADACHE, UNSPECIFIED HEADACHE TYPE: ICD-10-CM

## 2024-10-25 DIAGNOSIS — K59.09 CHRONIC CONSTIPATION: ICD-10-CM

## 2024-10-25 DIAGNOSIS — I10 ESSENTIAL HYPERTENSION: Primary | ICD-10-CM

## 2024-10-25 DIAGNOSIS — L24.9 IRRITANT CONTACT DERMATITIS, UNSPECIFIED TRIGGER: ICD-10-CM

## 2024-10-25 DIAGNOSIS — M85.89 OSTEOPENIA OF MULTIPLE SITES: ICD-10-CM

## 2024-10-25 DIAGNOSIS — G43.909 MIGRAINE WITHOUT STATUS MIGRAINOSUS, NOT INTRACTABLE, UNSPECIFIED MIGRAINE TYPE: ICD-10-CM

## 2024-10-25 DIAGNOSIS — M54.32 BILATERAL SCIATICA: ICD-10-CM

## 2024-10-25 DIAGNOSIS — R73.03 PREDIABETES: ICD-10-CM

## 2024-10-25 DIAGNOSIS — Z80.0 FAMILY HISTORY OF COLON CANCER IN FATHER: ICD-10-CM

## 2024-10-25 DIAGNOSIS — L40.0 PLAQUE PSORIASIS: ICD-10-CM

## 2024-10-25 DIAGNOSIS — M54.31 BILATERAL SCIATICA: ICD-10-CM

## 2024-10-25 DIAGNOSIS — G89.29 CHRONIC NONINTRACTABLE HEADACHE, UNSPECIFIED HEADACHE TYPE: ICD-10-CM

## 2024-10-25 DIAGNOSIS — M54.16 LUMBAR RADICULOPATHY, CHRONIC: ICD-10-CM

## 2024-10-25 DIAGNOSIS — N18.31 CKD STAGE 3A, GFR 45-59 ML/MIN: ICD-10-CM

## 2024-10-25 DIAGNOSIS — Z86.0100 HISTORY OF COLON POLYPS: ICD-10-CM

## 2024-10-25 DIAGNOSIS — E78.2 MIXED HYPERLIPIDEMIA: ICD-10-CM

## 2024-10-25 DIAGNOSIS — Z23 IMMUNIZATION DUE: ICD-10-CM

## 2024-10-25 DIAGNOSIS — M19.042 PRIMARY OSTEOARTHRITIS OF BOTH HANDS: ICD-10-CM

## 2024-10-25 DIAGNOSIS — Z12.31 ENCOUNTER FOR SCREENING MAMMOGRAM FOR BREAST CANCER: ICD-10-CM

## 2024-10-25 PROBLEM — M79.602 PAIN OF LEFT UPPER EXTREMITY: Status: RESOLVED | Noted: 2019-01-14 | Resolved: 2024-10-25

## 2024-10-25 PROCEDURE — 99214 OFFICE O/P EST MOD 30 MIN: CPT | Mod: PBBFAC,PO | Performed by: FAMILY MEDICINE

## 2024-10-25 PROCEDURE — 99999 PR PBB SHADOW E&M-EST. PATIENT-LVL IV: CPT | Mod: PBBFAC,,, | Performed by: FAMILY MEDICINE

## 2024-10-25 RX ORDER — METOPROLOL TARTRATE 25 MG/1
25 TABLET, FILM COATED ORAL 2 TIMES DAILY
Qty: 180 TABLET | Refills: 3 | Status: SHIPPED | OUTPATIENT
Start: 2024-10-25

## 2024-10-25 RX ORDER — TRIAMCINOLONE ACETONIDE 1 MG/G
OINTMENT TOPICAL 2 TIMES DAILY
Qty: 30 G | Refills: 0 | Status: SHIPPED | OUTPATIENT
Start: 2024-10-25

## 2024-10-25 RX ORDER — ATORVASTATIN CALCIUM 40 MG/1
40 TABLET, FILM COATED ORAL NIGHTLY
Qty: 90 TABLET | Refills: 3 | Status: SHIPPED | OUTPATIENT
Start: 2024-10-25

## 2024-10-25 RX ORDER — MELOXICAM 15 MG/1
15 TABLET ORAL DAILY
Qty: 90 TABLET | Refills: 1 | Status: SHIPPED | OUTPATIENT
Start: 2024-10-25

## 2024-10-25 RX ORDER — VENLAFAXINE HYDROCHLORIDE 150 MG/1
150 CAPSULE, EXTENDED RELEASE ORAL DAILY
Qty: 90 CAPSULE | Refills: 3 | Status: SHIPPED | OUTPATIENT
Start: 2024-10-25

## 2024-10-25 RX ORDER — DICLOFENAC SODIUM 10 MG/G
2 GEL TOPICAL 2 TIMES DAILY PRN
Qty: 200 G | Refills: 6 | Status: SHIPPED | OUTPATIENT
Start: 2024-10-25

## 2024-10-25 RX ORDER — AMLODIPINE BESYLATE 2.5 MG/1
2.5 TABLET ORAL DAILY
Qty: 90 TABLET | Refills: 3 | Status: SHIPPED | OUTPATIENT
Start: 2024-10-25

## 2024-10-25 RX ORDER — VENLAFAXINE HYDROCHLORIDE 75 MG/1
CAPSULE, EXTENDED RELEASE ORAL
Qty: 90 CAPSULE | Refills: 3 | Status: SHIPPED | OUTPATIENT
Start: 2024-10-25

## 2024-10-25 RX ORDER — PREDNISONE 20 MG/1
TABLET ORAL
COMMUNITY
Start: 2024-06-01 | End: 2024-10-25

## 2024-10-25 RX ORDER — CYCLOBENZAPRINE HCL 10 MG
10 TABLET ORAL
COMMUNITY
Start: 2024-06-01 | End: 2024-10-25

## 2024-10-25 NOTE — PROGRESS NOTES
Subjective:         Patient ID: Maddy Lopez is a 74 y.o. female.    Chief Complaint: Establish Care (/)    Patient Active Problem List   Diagnosis    Chronic headache disorder    Mixed hyperlipidemia    Prediabetes    Osteopenia of multiple sites    Family history of colon cancer in father    Essential hypertension    Bilateral sciatica    Lumbar radiculopathy, chronic    DDD (degenerative disc disease), lumbar      HPI    Maddy is a 74 y.o. female    History of Present Illness    CHIEF COMPLAINT:  Maddy presents today for follow-up.    GASTROINTESTINAL:  She reports daily bowel movements, but notes they are not as smooth or voluminous as when taking Miralax and a muscle relaxer daily. She had reduced the frequency to every other day due to underwear staining.    WEIGHT MANAGEMENT:  She expresses concerns about her weight, acknowledging nightly ice cream consumption as a contributing factor and recognizing the need to discontinue this habit.    DERMATOLOGICAL:  She noticed a non-itchy, scaly rash on her neck and a scaly lesion on her buttocks present for a few months. She denies any family history of psoriasis or skin cancers. She also reports a knot on the middle finger of her left hand, present for about a month but decreasing in size.    MUSCULOSKELETAL:  She reports pain in multiple finger joints of her right hand, worse during the day. She denies pain in the fingertips or morning stiffness. She was previously prescribed diclofenac gel for symptom management. She also experiences right-sided sciatica, previously affecting both sides. An MRI of the lumbar spine in 2022 revealed slight nerve root encroachment at the right L5 level. She acknowledges being less active with exercise recently.    MEDICATIONS:  Current medications include:  - Atorvastatin nightly for cholesterol  - Amlodipine 2.5 mg daily for blood pressure  - Effexor 150 mg and 75 mg together  - Metoprolol twice daily for blood pressure  -  "Mobic for hand arthritis pain    LAB RESULTS:  Recent labs show no anemia, chronic kidney disease stage 3 (noted as sometimes expected for long-standing hypertension), pre-diabetic A1C at 6.3 (slight increase over past two years), total cholesterol 197, , HDL 42, and good Vitamin D levels. TG mildly elevated    OSTEOPENIA:  Diagnosed in 2022 via bone density scan. She takes vitamin D and calcium supplements and performs exercises to prevent further bone loss. She is due for a repeat bone density scan.    FAMILY HISTORY:  Father had colon cancer. Her last colonoscopy in 2023 identified polyps, with advice to repeat in 3-5 years.       Objective:     Vitals:    10/25/24 0954   BP: 118/76   BP Location: Left arm   Patient Position: Sitting   Pulse: 88   SpO2: 97%   Weight: 65 kg (143 lb 4.8 oz)   Height: 5' 4" (1.626 m)      The 10-year ASCVD risk score (Devendra CASTELLON, et al., 2019) is: 10.8%    Values used to calculate the score:      Age: 74 years      Sex: Female      Is Non- : Yes      Diabetic: No      Tobacco smoker: No      Systolic Blood Pressure: 118 mmHg      Is BP treated: Yes      HDL Cholesterol: 42 mg/dL      Total Cholesterol: 197 mg/dL       Physical Exam  Skin:     Comments: Posterior neck - scaley rash similar in right of gluteal cleft area. More plaque like on gluteal area.           Assessment:       1. Essential hypertension    2. CKD stage 3a, GFR 45-59 ml/min    3. Mixed hyperlipidemia    4. Prediabetes    5. Osteopenia of multiple sites    6. History of colon polyps    7. Family history of colon cancer in father    8. Chronic constipation    9. Irritant contact dermatitis, unspecified trigger    10. Plaque psoriasis    11. Primary osteoarthritis of both hands Active   12. Chronic nonintractable headache, unspecified headache type Well controlled   13. Migraine without status migrainosus, not intractable, unspecified migraine type    14. Encounter for screening " mammogram for breast cancer    15. Immunization due    16. Bilateral sciatica    17. Lumbar radiculopathy, chronic          Plan:   Recent relevant labs results reviewed with patient.         Assessment & Plan    Assessed chronic kidney disease stage 3, likely due to long-standing hypertension  Evaluated prediabetes with A1C of 6.3, improving over past 2 years  Reviewed cholesterol panel; current Lipitor 40mg dose appropriate  Suspected psoriasis on neck and rectal area; prescribed topical steroid ointment  Considered hand arthritis as cause of finger joint pain; deferred X-ray  Noted small hematoma on left middle finger; will monitor clinically  Reviewed 2022 MRI showing mild L5 nerve root encroachment    CHRONIC KIDNEY DISEASE:  - Explained chronic kidney disease stage 3 and its relationship to long-term hypertension.  - Urine protein test ordered to assess kidney function.    PREDIABETES:  - Discussed prediabetes and importance of dietary changes to prevent progression to diabetes.  - Maddy to implement dietary changes to manage prediabetes: eat larger meals earlier in the day, with dinner being the smallest meal; consume fiber and protein before carbohydrates at meals; reduce ice cream intake to once a month or less; consider purchasing individual-sized ice cream portions.  - Contact office if experiencing increased thirst, frequent urination, or sticky urine before next appointment.    HYPERLIPIDEMIA:  - Educated on cholesterol panel results and impact of diet on lipid levels.    PSORIASIS:  - Provided information on psoriasis as a potential diagnosis for skin issues.  - Started triamcinolone cream for neck and rectal area psoriasis-like lesions, apply daily for 1 month.  - Follow up sooner if psoriasis-like lesions do not improve with prescribed treatment after 1 month.    ARTHRITIS:  - Explained arthritis as likely cause of hand pain.  - Refilled diclofenac gel for hand arthritis.    WEIGHT MANAGEMENT:  -  Discussed importance of portion control and healthier alternatives for managing ice cream consumption.    HYDRATION:  - Maddy to increase water intake to 8 glasses per day.    SCIATICA:  - Maddy to continue exercises for sciatica management.  - Contact office if experiencing permanent numbness or tingling on right side related to sciatica.    OSTEOPENIA:  - Bone density scan ordered to follow up on previous osteopenia diagnosis.    MEDICATIONS/SUPPLEMENTS:  - Continued atorvastatin 40mg daily, amlodipine 2.5mg daily, metoprolol twice daily, venlafaxine 150mg and 75mg daily.    FOLLOW UP:  - Follow up in 6 months.  - Complete ordered lab work prior to next visit.         1. Essential hypertension  -     amLODIPine (NORVASC) 2.5 MG tablet; Take 1 tablet (2.5 mg total) by mouth once daily.  Dispense: 90 tablet; Refill: 3  -     metoprolol tartrate (LOPRESSOR) 25 MG tablet; Take 1 tablet (25 mg total) by mouth 2 (two) times daily.  Dispense: 180 tablet; Refill: 3  -     Hepatic Function Panel; Future; Expected date: 10/25/2024  -     Renal Function Panel; Future; Expected date: 10/25/2024  -     Lipid Panel; Future; Expected date: 10/25/2024  -     TSH; Future; Expected date: 10/25/2024  -     Hemoglobin A1C; Future; Expected date: 10/25/2024  -     CBC Auto Differential; Future; Expected date: 10/25/2024    2. CKD stage 3a, GFR 45-59 ml/min  -     Microalbumin/Creatinine Ratio, Urine; Future; Expected date: 10/25/2024    3. Mixed hyperlipidemia  Overview:  On atorvastatin    Orders:  -     atorvastatin (LIPITOR) 40 MG tablet; Take 1 tablet (40 mg total) by mouth every evening.  Dispense: 90 tablet; Refill: 3  -     Lipid Panel; Future; Expected date: 10/25/2024    4. Prediabetes  -     Hemoglobin A1C; Future; Expected date: 10/25/2024    5. Osteopenia of multiple sites  -     DXA Bone Density Axial Skeleton 1 or more sites; Future; Expected date: 10/25/2024    6. History of colon polyps    7. Family history of colon  cancer in father    8. Chronic constipation  Comments:  Improved with fiber and hydration    9. Irritant contact dermatitis, unspecified trigger  -     triamcinolone acetonide 0.1% (KENALOG) 0.1 % ointment; Apply topically 2 (two) times daily.  Dispense: 30 g; Refill: 0    10. Plaque psoriasis  -     KATHLEEN Screen w/Reflex; Future; Expected date: 10/25/2024  -     ANTI-NEUTROPHILIC CYTOPLASMIC ANTIBODY; Future; Expected date: 10/25/2024  -     Sedimentation rate; Future; Expected date: 10/25/2024  -     Rheumatoid Factor; Future; Expected date: 10/25/2024  -     C-Reactive Protein; Future; Expected date: 10/25/2024    11. Primary osteoarthritis of both hands  Comments:  See AVS  Orders:  -     diclofenac sodium (VOLTAREN) 1 % Gel; Apply 2 g topically 2 (two) times daily as needed (hand pain).  Dispense: 200 g; Refill: 6  -     meloxicam (MOBIC) 15 MG tablet; Take 1 tablet (15 mg total) by mouth once daily.  Dispense: 90 tablet; Refill: 1  -     KATHLEEN Screen w/Reflex; Future; Expected date: 10/25/2024  -     ANTI-NEUTROPHILIC CYTOPLASMIC ANTIBODY; Future; Expected date: 10/25/2024  -     Sedimentation rate; Future; Expected date: 10/25/2024  -     Rheumatoid Factor; Future; Expected date: 10/25/2024  -     C-Reactive Protein; Future; Expected date: 10/25/2024    12. Chronic nonintractable headache, unspecified headache type  Comments:  Continue current medications and follow-up with neurology.  Orders:  -     venlafaxine (EFFEXOR-XR) 150 MG Cp24; Take 1 capsule (150 mg total) by mouth once daily. With 75 mg capsule for total of 225 mg daily  Dispense: 90 capsule; Refill: 3  -     venlafaxine (EFFEXOR-XR) 75 MG 24 hr capsule; TAKE 1 CAPSULE DAILY, ALONG WITH 150 MG CAPSULE FOR TOTAL DAILY DOSE  MG  Dispense: 90 capsule; Refill: 3    13. Migraine without status migrainosus, not intractable, unspecified migraine type  -     metoprolol tartrate (LOPRESSOR) 25 MG tablet; Take 1 tablet (25 mg total) by mouth 2 (two) times  daily.  Dispense: 180 tablet; Refill: 3    14. Encounter for screening mammogram for breast cancer  Comments:  Scheduled    15. Immunization due  Comments:  RSV and Tdap at pharmacy    16. Bilateral sciatica  Comments:  Home exercises    17. Lumbar radiculopathy, chronic  Comments:  Reviewed MRI        Patient's questions answered. Plan reviewed with patient at the end of visit. Relevant precautions to chief complaint and reasons to seek further medical care or to contact the office sooner reviewed with patient.     Follow up in about 6 months (around 4/25/2025) for Hypertension Follow-up, (prelabs).        Part of this note was dictated using voice recognition software. Please excuse any typographical errors.     This note was generated with the assistance of ambient listening technology. Verbal consent was obtained by the patient and accompanying visitor(s) for the recording of patient appointment to facilitate this note. I attest to having reviewed and edited the generated note for accuracy, though some syntax or spelling errors may persist. Please contact the author of this note for any clarification.

## 2024-11-19 ENCOUNTER — PATIENT MESSAGE (OUTPATIENT)
Dept: ADMINISTRATIVE | Facility: HOSPITAL | Age: 74
End: 2024-11-19
Payer: MEDICARE

## 2024-12-13 ENCOUNTER — HOSPITAL ENCOUNTER (OUTPATIENT)
Dept: RADIOLOGY | Facility: HOSPITAL | Age: 74
Discharge: HOME | End: 2024-12-13
Attending: FAMILY MEDICINE
Payer: MEDICARE

## 2024-12-13 DIAGNOSIS — M85.89 OSTEOPENIA OF MULTIPLE SITES: ICD-10-CM

## 2024-12-13 PROCEDURE — 77080 DXA BONE DENSITY AXIAL: CPT | Mod: 26,,, | Performed by: RADIOLOGY

## 2024-12-13 PROCEDURE — 77080 DXA BONE DENSITY AXIAL: CPT | Mod: TC

## 2025-01-28 ENCOUNTER — OFFICE VISIT (OUTPATIENT)
Dept: OPTOMETRY | Facility: CLINIC | Age: 75
End: 2025-01-28
Payer: MEDICARE

## 2025-01-28 DIAGNOSIS — H52.7 REFRACTIVE ERROR: ICD-10-CM

## 2025-01-28 DIAGNOSIS — H02.401 PTOSIS OF RIGHT EYELID: ICD-10-CM

## 2025-01-28 DIAGNOSIS — H25.13 NUCLEAR SCLEROSIS OF BOTH EYES: Primary | ICD-10-CM

## 2025-01-28 DIAGNOSIS — H04.123 BILATERAL DRY EYES: ICD-10-CM

## 2025-01-28 PROCEDURE — 92014 COMPRE OPH EXAM EST PT 1/>: CPT | Mod: S$PBB,,, | Performed by: OPTOMETRIST

## 2025-01-28 PROCEDURE — 99999 PR PBB SHADOW E&M-EST. PATIENT-LVL III: CPT | Mod: PBBFAC,,, | Performed by: OPTOMETRIST

## 2025-01-28 PROCEDURE — 99213 OFFICE O/P EST LOW 20 MIN: CPT | Mod: PBBFAC,PO | Performed by: OPTOMETRIST

## 2025-01-28 PROCEDURE — 92015 DETERMINE REFRACTIVE STATE: CPT | Mod: ,,, | Performed by: OPTOMETRIST

## 2025-01-28 NOTE — PROGRESS NOTES
AROLDO    ANGELICA: 11/7/2023  Chief complaint (CC): 75 yo F presents today for routine eye exam. Pt   reports mild decrease in vision. Use OTC readers for reading.  Glasses? +  Contacts? -  H/o eye surgery, injections or laser:    Ptosis of right eyelid  H/o eye injury: -  Known eye conditions?    Nuclear sclerosis of both eyes    Bilateral dry eyes  Family h/o eye conditions? -  Eye gtts? AT's gtts      (-) Flashes (+)  Floaters. longstanding (-) Mucous   (-)  Tearing (-) Itching (-) Burning   (-) Headaches (-) Eye Pain/discomfort (-) Irritation   (-)  Redness (-) Double vision (-) Blurry vision    Diabetic? -  A1c? -     Last edited by Mouna Johnson MA on 1/28/2025  8:26 AM.            Assessment /Plan     For exam results, see Encounter Report.    Nuclear sclerosis of both eyes    Ptosis of right eyelid    Bilateral dry eyes    Refractive error      Educated pt on presence of cataracts and effects on vision. No surgery at this time. Recheck in one year.  2. Recommend eval with Gagan Bauer, pt got surgery redone, better and she is happy, doc in [place] did original surgery  3. Recommend artificial tears. 1 drop 2x per day. Chronicity of disease and treatment discussed.     4. New Spectacle Rx given, discussed different options for glasses. RTC 1 year routine eye exam.

## 2025-02-22 DIAGNOSIS — Z00.00 ENCOUNTER FOR MEDICARE ANNUAL WELLNESS EXAM: ICD-10-CM

## 2025-02-25 NOTE — TELEPHONE ENCOUNTER
----- Message from Renea sent at 2/25/2025 10:40 AM CST -----  Type: Patient CallWho Called: Patient Does the patient know what this is regarding? Requesting a call back per the provider's request. Please advise Does the patient rather a call back or a response via [facility]? callBest Call Back Number: [phone] Additional Information:

## 2025-02-25 NOTE — TELEPHONE ENCOUNTER
Patient called to notify the office her BP was 146/79. Asked patient if this was a one time reading of an elevated B/P. States a couple of days. Instructed patient to take her BP everyday for 1 week, and to call us back with her readings. We would see then if Dr. Junior wants to make any changes. Verbalized understanding.

## 2025-03-05 ENCOUNTER — TELEPHONE (OUTPATIENT)
Dept: FAMILY MEDICINE | Facility: CLINIC | Age: 75
End: 2025-03-05
Payer: MEDICARE

## 2025-03-05 DIAGNOSIS — I10 ESSENTIAL HYPERTENSION: Primary | ICD-10-CM

## 2025-03-05 NOTE — TELEPHONE ENCOUNTER
----- Message from Priscilla sent at 3/5/2025  9:12 AM CST -----  Type:  Needs Medical AdviceWho Called: Pt Symptoms (please be specific): BP readings - 146/79, 142/80, 137/76, 146/72, 162/84, 151/83, 131/74 Would the patient rather a call back or a response via [facility]? Call back Best Call Back Number:  [phone] Information: Please be advised, pt states that she was told by  to write down her daily BP readings and let  know

## 2025-03-06 RX ORDER — AMLODIPINE BESYLATE 10 MG/1
10 TABLET ORAL DAILY
Qty: 90 TABLET | Refills: 0 | Status: SHIPPED | OUTPATIENT
Start: 2025-03-06

## 2025-03-10 ENCOUNTER — PATIENT MESSAGE (OUTPATIENT)
Dept: FAMILY MEDICINE | Facility: CLINIC | Age: 75
End: 2025-03-10
Payer: MEDICARE

## 2025-03-10 DIAGNOSIS — G43.909 MIGRAINE SYNDROME: ICD-10-CM

## 2025-03-11 RX ORDER — FROVATRIPTAN SUCCINATE 2.5 MG/1
2.5 TABLET, FILM COATED ORAL
Qty: 36 TABLET | Refills: 1 | Status: SHIPPED | OUTPATIENT
Start: 2025-03-11

## 2025-03-11 NOTE — TELEPHONE ENCOUNTER
No care due was identified.  [facility] Embedded Care Due Messages. Reference number: [id number].   3/11/2025 7:33:28 AM CDT

## 2025-03-11 NOTE — TELEPHONE ENCOUNTER
Refill Routing Note   Medication(s) are not appropriate for processing by [facility] for the following reason(s):        Outside of protocol    [facility] action(s):  Route        Medication Therapy Plan: prn/op      Appointments  past 12m or future 3m with PCP    Date Provider   Last Visit   10/25/2024 Marily Junior MD   Next Visit   4/14/2025 Marily Junior MD   ED visits in past 90 days: 0        Note composed:2:36 PM 03/11/2025

## 2025-03-19 ENCOUNTER — TELEPHONE (OUTPATIENT)
Dept: FAMILY MEDICINE | Facility: CLINIC | Age: 75
End: 2025-03-19
Payer: MEDICARE

## 2025-03-19 NOTE — TELEPHONE ENCOUNTER
----- Message from Farzaneh sent at 3/19/2025  9:03 AM CDT -----  Type:  Needs Medical AdviceWho Called: pt ABIOLA ACOSTA [[id number]]Would the patient rather a call back or a response via [facility]? Call Del Call Back Number: [phone] Additional Information: pt requesting call back in regard to new medication that was prescribed for blood pressure but never sent to pharmacy

## 2025-03-19 NOTE — TELEPHONE ENCOUNTER
----- Message from Agnes sent at 3/19/2025 10:13 AM CDT -----  Type:  Patient Returning CallWho Called:Pt Who Left Message for Patient:Abigail Does the patient know what this is regarding?:yes Would the patient rather a call back or a response via [facility]? Call back Best Call Back Number:[phone] Information:

## 2025-04-09 ENCOUNTER — LAB VISIT (OUTPATIENT)
Dept: LAB | Facility: HOSPITAL | Age: 75
End: 2025-04-09
Attending: FAMILY MEDICINE
Payer: MEDICARE

## 2025-04-09 DIAGNOSIS — I10 ESSENTIAL HYPERTENSION: ICD-10-CM

## 2025-04-09 DIAGNOSIS — L40.0 PLAQUE PSORIASIS: ICD-10-CM

## 2025-04-09 DIAGNOSIS — M19.042 PRIMARY OSTEOARTHRITIS OF BOTH HANDS: ICD-10-CM

## 2025-04-09 DIAGNOSIS — R73.03 PREDIABETES: ICD-10-CM

## 2025-04-09 DIAGNOSIS — N18.31 CKD STAGE 3A, GFR 45-59 ML/MIN: ICD-10-CM

## 2025-04-09 DIAGNOSIS — M19.041 PRIMARY OSTEOARTHRITIS OF BOTH HANDS: ICD-10-CM

## 2025-04-09 DIAGNOSIS — E78.2 MIXED HYPERLIPIDEMIA: ICD-10-CM

## 2025-04-09 LAB
ABSOLUTE EOSINOPHIL (OHS): 0.24 K/UL
ABSOLUTE MONOCYTE (OHS): 0.57 K/UL (ref 0.3–1)
ABSOLUTE NEUTROPHIL COUNT (OHS): 4.36 K/UL (ref 1.8–7.7)
ALBUMIN SERPL BCP-MCNC: 3.9 G/DL (ref 3.5–5.2)
ALP SERPL-CCNC: 117 UNIT/L (ref 40–150)
ALT SERPL W/O P-5'-P-CCNC: 18 UNIT/L (ref 10–44)
ANION GAP (OHS): 13 MMOL/L (ref 8–16)
AST SERPL-CCNC: 19 UNIT/L (ref 11–45)
BASOPHILS # BLD AUTO: 0.05 K/UL
BASOPHILS NFR BLD AUTO: 0.7 %
BILIRUB DIRECT SERPL-MCNC: 0.1 MG/DL (ref 0.1–0.3)
BILIRUB SERPL-MCNC: 0.3 MG/DL (ref 0.1–1)
BUN SERPL-MCNC: 12 MG/DL (ref 8–23)
CALCIUM SERPL-MCNC: 9.8 MG/DL (ref 8.7–10.5)
CHLORIDE SERPL-SCNC: 107 MMOL/L (ref 95–110)
CHOLEST SERPL-MCNC: 286 MG/DL (ref 120–199)
CHOLEST/HDLC SERPL: 6.7 {RATIO} (ref 2–5)
CO2 SERPL-SCNC: 22 MMOL/L (ref 23–29)
CREAT SERPL-MCNC: 1 MG/DL (ref 0.5–1.4)
CRP SERPL-MCNC: 1.4 MG/L
EAG (OHS): 128 MG/DL (ref 68–131)
ERYTHROCYTE [DISTWIDTH] IN BLOOD BY AUTOMATED COUNT: 14.7 % (ref 11.5–14.5)
ERYTHROCYTE [SEDIMENTATION RATE] IN BLOOD BY PHOTOMETRIC METHOD: 12 MM/HR
GFR SERPLBLD CREATININE-BSD FMLA CKD-EPI: 60 ML/MIN/1.73/M2
GLUCOSE SERPL-MCNC: 98 MG/DL (ref 70–110)
HBA1C MFR BLD: 6.1 % (ref 4–5.6)
HCT VFR BLD AUTO: 41.2 % (ref 37–48.5)
HDLC SERPL-MCNC: 43 MG/DL (ref 40–75)
HDLC SERPL: 15 % (ref 20–50)
HGB BLD-MCNC: 13.2 GM/DL (ref 12–16)
IMM GRANULOCYTES # BLD AUTO: 0.07 K/UL (ref 0–0.04)
IMM GRANULOCYTES NFR BLD AUTO: 1 % (ref 0–0.5)
LDLC SERPL CALC-MCNC: 213.2 MG/DL (ref 63–159)
LYMPHOCYTES # BLD AUTO: 1.84 K/UL (ref 1–4.8)
MCH RBC QN AUTO: 29.2 PG (ref 27–31)
MCHC RBC AUTO-ENTMCNC: 32 G/DL (ref 32–36)
MCV RBC AUTO: 91 FL (ref 82–98)
NONHDLC SERPL-MCNC: 243 MG/DL
NUCLEATED RBC (/100WBC) (OHS): 0 /100 WBC
PHOSPHATE SERPL-MCNC: 3.9 MG/DL (ref 2.7–4.5)
PLATELET # BLD AUTO: 303 K/UL (ref 150–450)
PMV BLD AUTO: 11.4 FL (ref 9.2–12.9)
POTASSIUM SERPL-SCNC: 4.1 MMOL/L (ref 3.5–5.1)
PROT SERPL-MCNC: 7.3 GM/DL (ref 6–8.4)
RBC # BLD AUTO: 4.52 M/UL (ref 4–5.4)
RELATIVE EOSINOPHIL (OHS): 3.4 %
RELATIVE LYMPHOCYTE (OHS): 25.8 % (ref 18–48)
RELATIVE MONOCYTE (OHS): 8 % (ref 4–15)
RELATIVE NEUTROPHIL (OHS): 61.1 % (ref 38–73)
RHEUMATOID FACT SERPL-ACNC: <13 IU/ML
SODIUM SERPL-SCNC: 142 MMOL/L (ref 136–145)
T4 FREE SERPL-MCNC: 0.76 NG/DL (ref 0.71–1.51)
TRIGL SERPL-MCNC: 149 MG/DL (ref 30–150)
TSH SERPL-ACNC: 4.36 UIU/ML (ref 0.4–4)
WBC # BLD AUTO: 7.13 K/UL (ref 3.9–12.7)

## 2025-04-09 PROCEDURE — 83036 HEMOGLOBIN GLYCOSYLATED A1C: CPT

## 2025-04-09 PROCEDURE — 84100 ASSAY OF PHOSPHORUS: CPT

## 2025-04-09 PROCEDURE — 36415 COLL VENOUS BLD VENIPUNCTURE: CPT | Mod: PO

## 2025-04-09 PROCEDURE — 85652 RBC SED RATE AUTOMATED: CPT

## 2025-04-09 PROCEDURE — 86036 ANCA SCREEN EACH ANTIBODY: CPT

## 2025-04-09 PROCEDURE — 83718 ASSAY OF LIPOPROTEIN: CPT

## 2025-04-09 PROCEDURE — 84439 ASSAY OF FREE THYROXINE: CPT

## 2025-04-09 PROCEDURE — 82248 BILIRUBIN DIRECT: CPT

## 2025-04-09 PROCEDURE — 85025 COMPLETE CBC W/AUTO DIFF WBC: CPT

## 2025-04-09 PROCEDURE — 86140 C-REACTIVE PROTEIN: CPT

## 2025-04-09 PROCEDURE — 80048 BASIC METABOLIC PNL TOTAL CA: CPT

## 2025-04-09 PROCEDURE — 84443 ASSAY THYROID STIM HORMONE: CPT

## 2025-04-09 PROCEDURE — 86431 RHEUMATOID FACTOR QUANT: CPT

## 2025-04-14 ENCOUNTER — OFFICE VISIT (OUTPATIENT)
Dept: FAMILY MEDICINE | Facility: CLINIC | Age: 75
End: 2025-04-14
Payer: MEDICARE

## 2025-04-14 VITALS
HEIGHT: 64 IN | WEIGHT: 140.63 LBS | BODY MASS INDEX: 24.01 KG/M2 | OXYGEN SATURATION: 95 % | SYSTOLIC BLOOD PRESSURE: 128 MMHG | DIASTOLIC BLOOD PRESSURE: 72 MMHG | HEART RATE: 69 BPM

## 2025-04-14 DIAGNOSIS — G43.909 MIGRAINE WITHOUT STATUS MIGRAINOSUS, NOT INTRACTABLE, UNSPECIFIED MIGRAINE TYPE: ICD-10-CM

## 2025-04-14 DIAGNOSIS — E03.8 SUBCLINICAL HYPOTHYROIDISM: ICD-10-CM

## 2025-04-14 DIAGNOSIS — Z80.0 FAMILY HISTORY OF COLON CANCER IN FATHER: ICD-10-CM

## 2025-04-14 DIAGNOSIS — E78.2 MIXED HYPERLIPIDEMIA: ICD-10-CM

## 2025-04-14 DIAGNOSIS — M85.89 OSTEOPENIA OF MULTIPLE SITES: ICD-10-CM

## 2025-04-14 DIAGNOSIS — N18.31 CKD STAGE 3A, GFR 45-59 ML/MIN: ICD-10-CM

## 2025-04-14 DIAGNOSIS — L81.9 HYPERPIGMENTATION: ICD-10-CM

## 2025-04-14 DIAGNOSIS — M54.32 BILATERAL SCIATICA: ICD-10-CM

## 2025-04-14 DIAGNOSIS — M19.041 PRIMARY OSTEOARTHRITIS OF BOTH HANDS: ICD-10-CM

## 2025-04-14 DIAGNOSIS — M54.31 BILATERAL SCIATICA: ICD-10-CM

## 2025-04-14 DIAGNOSIS — I10 ESSENTIAL HYPERTENSION: Primary | ICD-10-CM

## 2025-04-14 DIAGNOSIS — R73.03 PREDIABETES: ICD-10-CM

## 2025-04-14 DIAGNOSIS — M19.042 PRIMARY OSTEOARTHRITIS OF BOTH HANDS: ICD-10-CM

## 2025-04-14 LAB
W C-ANCA: NORMAL TITER
W P-ANCA: NORMAL TITER

## 2025-04-14 PROCEDURE — 99999 PR PBB SHADOW E&M-EST. PATIENT-LVL IV: CPT | Mod: PBBFAC,,, | Performed by: FAMILY MEDICINE

## 2025-04-14 PROCEDURE — 99214 OFFICE O/P EST MOD 30 MIN: CPT | Mod: PBBFAC,PO | Performed by: FAMILY MEDICINE

## 2025-04-14 RX ORDER — ROSUVASTATIN CALCIUM 40 MG/1
40 TABLET, COATED ORAL NIGHTLY
Qty: 90 TABLET | Refills: 3 | Status: SHIPPED | OUTPATIENT
Start: 2025-04-14 | End: 2026-04-14

## 2025-04-14 RX ORDER — METOPROLOL TARTRATE 25 MG/1
25 TABLET, FILM COATED ORAL 2 TIMES DAILY
Qty: 180 TABLET | Refills: 3 | Status: SHIPPED | OUTPATIENT
Start: 2025-04-14

## 2025-04-14 RX ORDER — AMLODIPINE BESYLATE 10 MG/1
10 TABLET ORAL DAILY
Qty: 90 TABLET | Refills: 3 | Status: SHIPPED | OUTPATIENT
Start: 2025-04-14 | End: 2025-04-14 | Stop reason: SDUPTHER

## 2025-04-14 RX ORDER — AMLODIPINE BESYLATE 10 MG/1
10 TABLET ORAL DAILY
Qty: 90 TABLET | Refills: 3 | Status: SHIPPED | OUTPATIENT
Start: 2025-04-14

## 2025-04-14 NOTE — PROGRESS NOTES
Subjective:         Patient ID: Maddy Lopez is a 74 y.o. female.    Chief Complaint: Hypertension    Patient Active Problem List   Diagnosis    Chronic headache disorder    Mixed hyperlipidemia    Prediabetes    Osteopenia of multiple sites    Family history of colon cancer in father    Essential hypertension    Bilateral sciatica    Lumbar radiculopathy, chronic    DDD (degenerative disc disease), lumbar    Primary osteoarthritis of both hands    Subclinical hypothyroidism    CKD stage 3a, GFR 45-59 ml/min      HPI    Maddy is a 74 y.o. female    History of Present Illness    HPI:  Maddy presents for a hypertension follow-up visit and to discuss recent lab results. Maddy reports significant improvement in blood pressure since her last visit. She ran out of her previous blood pressure medication and has been taking only the new medication prescribed by the doctor, which appears to be effective. Her blood pressure today is 128/72, a significant improvement from previous readings in the 140s/80s range.    She discusses ongoing issues with hyperpigmentation on her face, noting gradual darkening of her skin despite using various face creams.    She reports ongoing finger pain, described as intermittent and affecting different fingers at different times. The pain improves with medication but eventually recurs.    She mentions intermittent chest pain, which has been occurring for years and was felt recently.    She discusses ongoing constipation, for which she has been taking Metamucil and Miralax. She expresses frustration with managing this issue through medication.    She denies dizziness, leg/feet swelling, or having rheumatoid arthritis.    MEDICATIONS:  Maddy is on Amlodipine 10 mg daily for hypertension, which has been effective in lowering her blood pressure. She takes Metoprolol twice daily for migraines and heart rate control. For depression, she is on Effexor 150 mg + 75 mg daily. Maddy takes  "Atorvastatin 40 mg daily for high cholesterol. She uses Metamucil and Miralax daily for constipation. A topical medication is used for finger pain, which is effective but the pain recurs. Her Amlodipine dose was increased from 2.5 mg to 10 mg for hypertension, and the previous lower dose was discontinued.    MEDICAL HISTORY:  Maddy has a history of hypertension, pre-diabetes, and hypercholesterolemia. She has chronic kidney disease, which may have resolved. Maddy also has subclinical hypothyroidism and arthritis in her fingers.    FAMILY HISTORY:  Family history is significant for aunt experiencing chest pain and palpitations similar to the patient's symptoms.    TEST RESULTS:  Maddy's GFR has improved from 53 to 60. Her rheumatoid arthritis tests and hemoglobin levels are normal. Her A1C has decreased from 6.3% to 6.1%. Maddy's LDL cholesterol has increased. Her thyroid tests are slightly off, indicating subclinical hypothyroidism. Maddy has completed multiple EKGs in the past, all with normal results.    SOCIAL HISTORY:   History: Maddy uses  discounts       Objective:     Vitals:    04/14/25 1105   BP: 128/72   BP Location: Left arm   Patient Position: Sitting   Pulse: 69   SpO2: 95%   Weight: 63.8 kg (140 lb 10.5 oz)   Height: 5' 4" (1.626 m)         Physical Exam  Vitals and nursing note reviewed.   Constitutional:       General: She is not in acute distress.     Appearance: Normal appearance. She is not ill-appearing, toxic-appearing or diaphoretic.   HENT:      Head: Normocephalic and atraumatic.   Eyes:      General: No scleral icterus.     Conjunctiva/sclera: Conjunctivae normal.   Cardiovascular:      Rate and Rhythm: Normal rate.      Heart sounds: Normal heart sounds. No murmur heard.  Pulmonary:      Effort: Pulmonary effort is normal. No respiratory distress.   Skin:     Coloration: Skin is not pale.   Neurological:      Mental Status: She is alert. Mental status is at baseline. "   Psychiatric:         Attention and Perception: Attention and perception normal.         Mood and Affect: Mood and affect normal.         Speech: Speech normal.         Behavior: Behavior normal.         Cognition and Memory: Cognition and memory normal.         Judgment: Judgment normal.       Assessment:       1. Essential hypertension    2. Mixed hyperlipidemia    3. Prediabetes    4. Bilateral sciatica    5. Osteopenia of multiple sites    6. Family history of colon cancer in father    7. CKD stage 3a, GFR 45-59 ml/min    8. Subclinical hypothyroidism    9. Hyperpigmentation    10. Primary osteoarthritis of both hands    11. Migraine without status migrainosus, not intractable, unspecified migraine type          Plan:   Recent relevant labs results reviewed with patient.         Assessment & Plan    HTN improved: /72 with amlodipine 10 mg daily.  Renal function improved: GFR increased from 53 to 60, potentially due to better BP control.  A1c slightly improved (6.3 to 6.1), still pre-diabetic; cholesterol worsened.  Thyroid slightly off (subclinical hypothyroidism).  Chest pain/palpitations noted; previous normal EKGs.  Continued metoprolol twice daily for migraines, BP, and heart rate control.  Diagnosed osteoarthritis in fingers; negative rheumatoid arthritis labs.  Skin hyperpigmentation likely post-menopausal melasma, not medication-related.    HYPERTENSION:  - Explained relationship between age, calcium buildup in blood vessels, and increased BP.  - Continued amlodipine 10 mg daily for BP control.    HYPERLIPIDEMIA:  - Changed atorvastatin 40 mg to rosuvastatin 40 mg daily for cholesterol management.    DIETARY COUNSELING:  - Discussed protein sources in diet, including non-meat options like beans and fish.  - Maddy to discontinue daily ice cream.    MELASMA:  - Provided information on post-menopausal melasma and potential skincare treatments.  - Maddy to use sunblock consistently to manage  hyperpigmentation.  - Referred to dermatology for evaluation and management of hyperpigmentation.    ARTHRITIS:  - Educated on difference between rheumatoid and osteoarthritis.    CHRONIC KIDNEY DISEASE:  - Ordered urine test to check for protein (renal function).  - Ordered renal US to assess for CKD.    FOLLOW-UP:  - Follow up in 4 months.  - Contact the office if chest pain/palpitations worsen or become more frequent.         1. Essential hypertension  Comments:  Controlled  Orders:  -     Discontinue: amLODIPine (NORVASC) 10 MG tablet; Take 1 tablet (10 mg total) by mouth once daily.  Dispense: 90 tablet; Refill: 3  -     Hepatic Function Panel; Future; Expected date: 04/14/2025  -     Renal Function Panel; Future; Expected date: 04/14/2025  -     Lipid Panel; Future; Expected date: 04/14/2025  -     metoprolol tartrate (LOPRESSOR) 25 MG tablet; Take 1 tablet (25 mg total) by mouth 2 (two) times daily.  Dispense: 180 tablet; Refill: 3  -     amLODIPine (NORVASC) 10 MG tablet; Take 1 tablet (10 mg total) by mouth once daily.  Dispense: 90 tablet; Refill: 3    2. Mixed hyperlipidemia  Comments:  Worsened. Changed from atorvastatin 40 mg to rosuvastatin 40 mg    Orders:  -     rosuvastatin (CRESTOR) 40 MG Tab; Take 1 tablet (40 mg total) by mouth every evening.  Dispense: 90 tablet; Refill: 3  -     Hepatic Function Panel; Future; Expected date: 04/14/2025    3. Prediabetes  Comments:  Improved    4. Bilateral sciatica  Chronic, stable    5. Osteopenia of multiple sites  Vit D/ca    6. Family history of colon cancer in father  Comments:  UTD on colonoscopy    7. CKD stage 3a, GFR 45-59 ml/min  -     Microalbumin/Creatinine Ratio, Urine; Future; Expected date: 04/14/2025  -     US Retroperitoneal Complete; Future; Expected date: 04/14/2025    8. Subclinical hypothyroidism  -     TSH; Future; Expected date: 04/14/2025  -     T4, Free; Future; Expected date: 04/14/2025  -     T3, Free; Future; Expected date:  04/14/2025  -     Thyrotropin Receptor Antibody; Future; Expected date: 04/14/2025    9. Hyperpigmentation  -     Ambulatory referral/consult to Dermatology; Future; Expected date: 04/21/2025    10. Primary osteoarthritis of both hands  - Chronic nature of condition discussed along with expected relapsing/remitting course and potential triggers. Ongoing care and medication use routinely and during flaring of symptoms reviewed.     11. Migraine without status migrainosus, not intractable, unspecified migraine type  -     metoprolol tartrate (LOPRESSOR) 25 MG tablet; Take 1 tablet (25 mg total) by mouth 2 (two) times daily.  Dispense: 180 tablet; Refill: 3    Patient's questions answered. Plan reviewed with patient at the end of visit. Relevant precautions to chief complaint and reasons to seek further medical care or to contact the office sooner reviewed with patient.     Follow up in about 4 months (around 8/14/2025) for HLD f/u , (prelabs).      I spent a total of 30 minutes on the day of the visit.  This includes face to face time and non-face to face time preparing to see the patient (eg, review of tests), obtaining and/or reviewing separately obtained history, documenting clinical information in the electronic or other health record, independently interpreting results and communicating results to the patient/family/caregiver, or care coordinator.    Part of this note was dictated using voice recognition software. Please excuse any typographical errors.     This note was generated with the assistance of ambient listening technology. Verbal consent was obtained by the patient and accompanying visitor(s) for the recording of patient appointment to facilitate this note. I attest to having reviewed and edited the generated note for accuracy, though some syntax or spelling errors may persist. Please contact the author of this note for any clarification.

## 2025-04-15 ENCOUNTER — PATIENT MESSAGE (OUTPATIENT)
Dept: FAMILY MEDICINE | Facility: CLINIC | Age: 75
End: 2025-04-15
Payer: MEDICARE

## 2025-05-14 ENCOUNTER — TELEPHONE (OUTPATIENT)
Dept: FAMILY MEDICINE | Facility: CLINIC | Age: 75
End: 2025-05-14
Payer: MEDICARE

## 2025-05-14 NOTE — TELEPHONE ENCOUNTER
Spoke with patient and gave the name of the dermatologist, date scheduled to be seen. Verbalized understanding.

## 2025-05-14 NOTE — TELEPHONE ENCOUNTER
Returned patient call to let her know which Dermatologist she is scheduled to see. Initially she answered, but hung up on me. Called her back X 3, each time she hung up. Unable to provide information she requested.

## 2025-05-14 NOTE — TELEPHONE ENCOUNTER
----- Message from Ashlee sent at 5/14/2025  9:10 AM CDT -----  Type:  Patient Returning CallWho Called:pt Who Left Message for Patient:saul Does the patient know what this is regarding?:returning a call Would the patient rather a call back or a response via [facility]? Call Best Call Back Number: [phone] Information:   Plan: Acitretin 10 mg every other day Render In Strict Bullet Format?: No Detail Level: Zone

## 2025-05-14 NOTE — TELEPHONE ENCOUNTER
----- Message from Justin sent at 5/14/2025  9:11 AM CDT -----  Type: Patient Call Back Who called: Self  What is the request in detail: pt states provider referred her to a specialists regarding her face. She would like someone to contact her to clarify who the provider is because she does not remember.  Can the clinic reply by [facility]? Would the patient rather a call back or a response via [facility]?  Call back  Best call back number: [phone]  Additional Information:

## 2025-05-15 ENCOUNTER — TELEPHONE (OUTPATIENT)
Dept: FAMILY MEDICINE | Facility: CLINIC | Age: 75
End: 2025-05-15
Payer: MEDICARE

## 2025-05-15 NOTE — TELEPHONE ENCOUNTER
Pt would like to know the name of the Dr that you told her about that is [place], she does not want to wait until August

## 2025-05-15 NOTE — TELEPHONE ENCOUNTER
----- Message from Minnie sent at 5/15/2025  3:51 PM CDT -----  Patient Returning a callWho is calling?  PatientWho called patient?  OfficeCall back or [facility] message?  Call backCall back number: [phone]

## 2025-05-15 NOTE — TELEPHONE ENCOUNTER
----- Message from Constance sent at 5/15/2025 12:11 PM CDT -----  Regarding: Dermatology  Hi Miss Russell , The patient called the referral coordinator line wanting to speak with you in regards to a dermatology referral. Can you please give the patient a call whenever you get a chance please.

## 2025-05-20 ENCOUNTER — TELEPHONE (OUTPATIENT)
Dept: FAMILY MEDICINE | Facility: CLINIC | Age: 75
End: 2025-05-20
Payer: MEDICARE

## 2025-05-20 NOTE — TELEPHONE ENCOUNTER
----- Message from Farzaneh sent at 5/20/2025 10:29 AM CDT -----  Type:  Needs Medical AdviceWho Called: pt ABIOLA ACOSTA [[id number]]Would the patient rather a call back or a response via [facility]? Call back Best Call Back Number: [phone] Additional Information: pt requesting a call back in regards to referral for DERM pt needs name of [facility]. To see if there is earlier availability due to face getting worse

## 2025-05-21 ENCOUNTER — TELEPHONE (OUTPATIENT)
Dept: ADMINISTRATIVE | Facility: OTHER | Age: 75
End: 2025-05-21
Payer: MEDICARE

## 2025-05-22 ENCOUNTER — TELEPHONE (OUTPATIENT)
Dept: FAMILY MEDICINE | Facility: CLINIC | Age: 75
End: 2025-05-22
Payer: MEDICARE

## 2025-05-22 NOTE — TELEPHONE ENCOUNTER
Pt updated on plan of care, sitter in line of sight.   States Dr. Junior recently changed her BP medication. Currently taking Norvasc 10 mg qd in the evening. Last couple of days she has felt dizzy when she wakes up. Asked what her BP readings are. States her BP machine has been broken and she has not been able to take BP. Instructed to repair or replace BP machine. States she obtained a new machine today. Again asked what her BP is. States she does not remember. Instructed to keep log of BP so that we can see readings.To take her time getting out of bed in am. Will sit before she gets up. States this is not a new symptom.

## 2025-05-22 NOTE — TELEPHONE ENCOUNTER
----- Message from Agnes sent at 5/22/2025  1:10 PM CDT -----  Type:  Patient Returning CallWho Called:Pt Would the patient rather a call back or a response via [facility]? Call back Best Call Back Number:[phone] Information: requesting a call from Dr Junior about her blood pressure... when she wakes up in the morning she is dizzy

## 2025-06-02 ENCOUNTER — TELEPHONE (OUTPATIENT)
Dept: NEUROLOGY | Facility: CLINIC | Age: 75
End: 2025-06-02
Payer: MEDICARE

## 2025-06-09 ENCOUNTER — OFFICE VISIT (OUTPATIENT)
Dept: FAMILY MEDICINE | Facility: CLINIC | Age: 75
End: 2025-06-09
Payer: MEDICARE

## 2025-06-09 VITALS
HEIGHT: 64 IN | WEIGHT: 138.69 LBS | BODY MASS INDEX: 23.68 KG/M2 | SYSTOLIC BLOOD PRESSURE: 116 MMHG | HEART RATE: 78 BPM | DIASTOLIC BLOOD PRESSURE: 60 MMHG | OXYGEN SATURATION: 99 %

## 2025-06-09 DIAGNOSIS — N18.31 CKD STAGE 3A, GFR 45-59 ML/MIN: ICD-10-CM

## 2025-06-09 DIAGNOSIS — I10 ESSENTIAL HYPERTENSION: ICD-10-CM

## 2025-06-09 DIAGNOSIS — R41.3 MEMORY CHANGES: ICD-10-CM

## 2025-06-09 DIAGNOSIS — E78.2 MIXED HYPERLIPIDEMIA: ICD-10-CM

## 2025-06-09 DIAGNOSIS — Z00.00 ENCOUNTER FOR MEDICARE ANNUAL WELLNESS EXAM: Primary | ICD-10-CM

## 2025-06-09 PROCEDURE — 99215 OFFICE O/P EST HI 40 MIN: CPT | Mod: PBBFAC,PO

## 2025-06-09 PROCEDURE — 99999 PR PBB SHADOW E&M-EST. PATIENT-LVL V: CPT | Mod: PBBFAC,,,

## 2025-06-09 NOTE — PATIENT INSTRUCTIONS
Counseling and Referral of Other Preventative  (Italic type indicates deductible and co-insurance are waived)    Patient Name: Maddy Lopez  Today's Date: 6/9/2025    Health Maintenance       Date Due Completion Date    Annual UACr 06/09/2022 6/9/2021    Mammogram 11/01/2025 11/1/2024    Hemoglobin A1c (Prediabetes) 04/09/2026 4/9/2025    High Dose Statin 04/14/2026 4/14/2025    DEXA Scan 12/13/2027 12/13/2024    Colorectal Cancer Screening 10/25/2028 10/25/2023    Lipid Panel 04/09/2030 4/9/2025    TETANUS VACCINE 10/28/2034 10/28/2024        Orders Placed This Encounter   Procedures    Ambulatory referral/consult to Adult Neuropsychology     The following information is provided to all patients.  This information is to help you find resources for any of the problems found today that may be affecting your health:                  Living healthy guide: www.[facility].[place].[facility]      Understanding Diabetes: www.diabetes.org      Eating healthy: www.cdc.gov/healthyweight      CDC home safety checklist: www.cdc.gov/steadi/patient.html      Agency on Aging: www.goea.[place]      Alcoholics anonymous (AA): www.aa.org      Physical Activity: www.gisele.nih.gov/rf4rewj      Tobacco use: www.quitwithusla.org         Counseling and Referral of Other Preventative  (Italic type indicates deductible and co-insurance are waived)    Patient Name: Maddy Lopez  Today's Date: 6/9/2025    Health Maintenance       Date Due Completion Date    Annual UACr 06/09/2022 6/9/2021    Mammogram 11/01/2025 11/1/2024    Hemoglobin A1c (Prediabetes) 04/09/2026 4/9/2025    High Dose Statin 04/14/2026 4/14/2025    DEXA Scan 12/13/2027 12/13/2024    Colorectal Cancer Screening 10/25/2028 10/25/2023    Lipid Panel 04/09/2030 4/9/2025    TETANUS VACCINE 10/28/2034 10/28/2024        Orders Placed This Encounter   Procedures    Ambulatory referral/consult to Adult Neuropsychology     The following information is provided to all patients.  This  information is to help you find resources for any of the problems found today that may be affecting your health:                  Living healthy guide: www.[facility].[place].[facility]      Understanding Diabetes: www.diabetes.org      Eating healthy: www.cdc.gov/healthyweight      CDC home safety checklist: www.[facility].gov/steadi/patient.html      Agency on Aging: www.[facility]      Alcoholics anonymous (AA): www.aa.org      Physical Activity: www.gisele.nih.gov/mg9zlii      Tobacco use: www.Panda[employer].org         Counseling and Referral of Other Preventative  (Italic type indicates deductible and co-insurance are waived)    Patient Name: Maddy Lopez  Today's Date: 6/9/2025    Health Maintenance       Date Due Completion Date    Annual UACr 06/09/2022 6/9/2021    Mammogram 11/01/2025 11/1/2024    Hemoglobin A1c (Prediabetes) 04/09/2026 4/9/2025    High Dose Statin 04/14/2026 4/14/2025    DEXA Scan 12/13/2027 12/13/2024    Colorectal Cancer Screening 10/25/2028 10/25/2023    Lipid Panel 04/09/2030 4/9/2025    TETANUS VACCINE 10/28/2034 10/28/2024        Orders Placed This Encounter   Procedures    Ambulatory referral/consult to Adult Neuropsychology     The following information is provided to all patients.  This information is to help you find resources for any of the problems found today that may be affecting your health:                  Living healthy guide: www.[facility].[place]      Understanding Diabetes: www.diabetes.org      Eating healthy: www.cdc.gov/healthyweight      CDC home safety checklist: www.[facility].gov/steadi/patient.html      Agency on Aging: www.[facility]      Alcoholics anonymous (AA): www.aa.org      Physical Activity: www.[place].nih.gov/bd0zdpv      Tobacco use: www.[facility].org

## 2025-06-09 NOTE — PROGRESS NOTES
"  Maddy Lopez presented for a  Medicare AWV and comprehensive Health Risk Assessment today. The following components were reviewed and updated:    Medical history  Family History  Social history  Allergies and Current Medications  Health Risk Assessment  Health Maintenance  Care Team         ** See Completed Assessments for Annual Wellness Visit within the encounter summary.**         The following assessments were completed:  Living Situation  CAGE  Depression Screening  Timed Get Up and Go  Whisper Test  Cognitive Function Screening    Nutrition Screening  ADL Screening  PAQ Screening      Opioid documentation for eAWV      Patient does not have a current opioid prescription.        Review for Substance Use Disorders: Patient does not use substance      Current Medications[1]       Vitals:    06/09/25 1014   BP: 116/60   Pulse: 78   SpO2: 99%   Weight: 62.9 kg (138 lb 10.7 oz)   Height: 5' 4" (1.626 m)   PainSc: 0-No pain      Physical Exam  Vitals reviewed.   Constitutional:       Appearance: Normal appearance. She is not ill-appearing.   HENT:      Head: Normocephalic and atraumatic.   Eyes:      Conjunctiva/sclera: Conjunctivae normal.   Cardiovascular:      Rate and Rhythm: Normal rate.   Pulmonary:      Effort: Pulmonary effort is normal. No respiratory distress.   Abdominal:      Tenderness: There is no abdominal tenderness.   Musculoskeletal:         General: Normal range of motion.      Cervical back: Normal range of motion.   Skin:     General: Skin is warm.   Neurological:      Mental Status: She is alert and oriented to person, place, and time.               Diagnoses and health risks identified today and associated recommendations/orders:    1. Encounter for Medicare annual wellness exam  - Referral to Enhanced Annual Wellness Visit (eAWV) W+1  - Chart reviewed. Problem list updated. Discussed current medical diagnosis, current medications, medical/surgical/family/social history; updated provider " list; documented vital signs; identified any cognitive impairment; and updated risk factor list. Addressed any outstanding health maintenance. Provided patient with personalized health advice. Continue to follow up with PCP and any specialists.    2. Memory changes  - Ambulatory referral/consult to Adult Neuropsychology; Future  Pt endorse memory changes referral placed to neuropsych  3. CKD stage 3a, GFR 45-59 ml/min  Chronic stable last GFR 60 continue to monitor   4. Mixed hyperlipidemia  Continued  rosuvastatin will recheck at next visit.   5. Essential hypertension  Pt unsure of medication she is taking. Due to confusion pt will follow up and bring all medication to next visit. If any worsening symptoms occur pt to follow up sooner.Pt is only taking lopressor 25mg twice daily. Plan to hold one lopressor dose until next visit.        Assessment & Plan               Provided Maddy with a 5-10 year written screening schedule and personal prevention plan. Recommendations were developed using the USPSTF age appropriate recommendations. Education, counseling, and referrals were provided as needed. After Visit Summary printed and given to patient which includes a list of additional screenings\tests needed.    No follow-ups on file.    NICCI Khoury    Advance Care Planning   I offered to discuss advanced care planning, including how to pick a person who would make decisions for you if you were unable to make them for yourself, called a health care power of , and what kind of decisions you might make such as use of life sustaining treatments such as ventilators and tube feeding when faced with a life limiting illness recorded on a living will that they will need to know. (How you want to be cared for as you near the end of your natural life)     X Patient is interested in learning more about how to make advanced directives.  I provided them paperwork and offered to discuss this with them.       [1]    Current Outpatient Medications:     cholecalciferol, vitamin D3, (VITAMIN D3) 50 mcg (2,000 unit) Cap, Take 1 capsule by mouth once daily., Disp: , Rfl:     frovatriptan (FROVA) 2.5 MG tablet, Take 1 tablet (2.5 mg total) by mouth as needed for Migraine. If recurs, may repeat once after 2 hours. Don't exceed 3 per week., Disp: 36 tablet, Rfl: 1    meloxicam (MOBIC) 15 MG tablet, Take 1 tablet (15 mg total) by mouth once daily., Disp: 90 tablet, Rfl: 1    metoprolol tartrate (LOPRESSOR) 25 MG tablet, Take 1 tablet (25 mg total) by mouth 2 (two) times daily., Disp: 180 tablet, Rfl: 3    rosuvastatin (CRESTOR) 40 MG Tab, Take 1 tablet (40 mg total) by mouth every evening., Disp: 90 tablet, Rfl: 3    triamcinolone acetonide 0.1% (KENALOG) 0.1 % ointment, Apply topically 2 (two) times daily., Disp: 30 g, Rfl: 0    venlafaxine (EFFEXOR-XR) 150 MG Cp24, Take 1 capsule (150 mg total) by mouth once daily. With 75 mg capsule for total of 225 mg daily, Disp: 90 capsule, Rfl: 3    venlafaxine (EFFEXOR-XR) 75 MG 24 hr capsule, TAKE 1 CAPSULE DAILY, ALONG WITH 150 MG CAPSULE FOR TOTAL DAILY DOSE  MG, Disp: 90 capsule, Rfl: 3    amLODIPine (NORVASC) 10 MG tablet, Take 1 tablet (10 mg total) by mouth once daily. (Patient not taking: Reported on 6/9/2025), Disp: 90 tablet, Rfl: 3    diclofenac sodium (VOLTAREN) 1 % Gel, Apply 2 g topically 2 (two) times daily as needed (hand pain). (Patient not taking: Reported on 6/9/2025), Disp: 200 g, Rfl: 6

## 2025-06-11 ENCOUNTER — OFFICE VISIT (OUTPATIENT)
Dept: FAMILY MEDICINE | Facility: CLINIC | Age: 75
End: 2025-06-11
Payer: MEDICARE

## 2025-06-11 VITALS
HEART RATE: 80 BPM | SYSTOLIC BLOOD PRESSURE: 94 MMHG | OXYGEN SATURATION: 98 % | WEIGHT: 137.56 LBS | DIASTOLIC BLOOD PRESSURE: 64 MMHG | BODY MASS INDEX: 23.49 KG/M2 | HEIGHT: 64 IN

## 2025-06-11 DIAGNOSIS — E78.2 MIXED HYPERLIPIDEMIA: ICD-10-CM

## 2025-06-11 DIAGNOSIS — R42 DIZZINESS: Primary | ICD-10-CM

## 2025-06-11 DIAGNOSIS — I10 ESSENTIAL HYPERTENSION: ICD-10-CM

## 2025-06-11 PROCEDURE — G2211 COMPLEX E/M VISIT ADD ON: HCPCS | Mod: ,,,

## 2025-06-11 PROCEDURE — 99999 PR PBB SHADOW E&M-EST. PATIENT-LVL IV: CPT | Mod: PBBFAC,,,

## 2025-06-11 PROCEDURE — 99214 OFFICE O/P EST MOD 30 MIN: CPT | Mod: S$PBB,,,

## 2025-06-11 PROCEDURE — 99214 OFFICE O/P EST MOD 30 MIN: CPT | Mod: PBBFAC,PO

## 2025-06-11 RX ORDER — ATORVASTATIN CALCIUM 40 MG/1
40 TABLET, FILM COATED ORAL DAILY
COMMUNITY
End: 2025-06-11 | Stop reason: DRUGHIGH

## 2025-06-11 RX ORDER — ROSUVASTATIN CALCIUM 40 MG/1
40 TABLET, COATED ORAL NIGHTLY
Qty: 90 TABLET | Refills: 3 | Status: SHIPPED | OUTPATIENT
Start: 2025-06-11 | End: 2026-06-11

## 2025-06-11 RX ORDER — AMLODIPINE BESYLATE 2.5 MG/1
2.5 TABLET ORAL DAILY
Qty: 90 TABLET | Refills: 0 | Status: SHIPPED | OUTPATIENT
Start: 2025-06-11

## 2025-06-11 NOTE — PROGRESS NOTES
[facility]    6/12/2025      Subjective:       Patient ID:  Maddy is a 74 y.o. female .  has a past medical history of Arthritis, Fever blister, Hyperlipidemia, Hypertension, Migraine headache, and Prediabetes.    History of Present Illness    CHIEF COMPLAINT:  Ms. Lopez presents today for follow up of blood pressure concerns    HYPERTENSION:  She reports elevated blood pressure since late 2023 with home readings ranging from 137/76 to 146/79 mmHg. She experiences dizziness, primarily upon rising in the morning, with mild episodes during the day. Patient stated that these symptoms started around the time that blood pressure medication was changed.    HYPERLIPIDEMIA:  Her cholesterol levels increased from 197 to 286 over a five-month period. Statin was recently changed and patient is taking rosuvastatin    CURRENT MEDICATIONS:  She takes Amlodipine  10 mg for blood pressure management and Effexor for migraine headaches.   Patient recently reported high blood pressure in April. Elevated readings prompted medication change from amlodipine 2.5-10 mg. Blood pressure cuff that patient was using  stop working shortly after elevated readings were reported.  Patient unsure if elevated readings that she sent and were accurate      ROS:  General: -fever, -chills, -fatigue, -weight gain, -weight loss  Eyes: -vision changes, -redness, -discharge  ENT: -ear pain, -nasal congestion, -sore throat  Cardiovascular: -chest pain, -palpitations, -lower extremity edema  Respiratory: -cough, -shortness of breath  Gastrointestinal: -abdominal pain, -nausea, -vomiting, -diarrhea, -constipation, -blood in stool  Genitourinary: -dysuria, -hematuria, -frequency  Musculoskeletal: -joint pain, -muscle pain  Skin: -rash, -lesion  Neurological: +headache, +dizziness, -numbness, -tingling, +near-syncope, +migraines  Psychiatric: -anxiety, -depression, -sleep difficulty           Problem List[1]      Last  HgbA1C:    Lab Results   Component Value Date    HGBA1C 6.1 (H) 04/09/2025    HGBA1C 6.3 (H) 10/21/2024    HGBA1C 6.1 (H) 04/19/2024         Last Lipid Panel:    Lab Results   Component Value Date    HDL 43 04/09/2025    HDL 42 10/21/2024    HDL 47 04/19/2024       Lab Results   Component Value Date    LDLCALC 213.2 (H) 04/09/2025    LDLCALC 123.2 10/21/2024    LDLCALC 132.0 04/19/2024       Lab Results   Component Value Date    TRIG 149 04/09/2025    TRIG 159 (H) 10/21/2024    TRIG 90 04/19/2024       Lab Results   Component Value Date    CHOLHDL 15.0 (L) 04/09/2025    CHOLHDL 21.3 10/21/2024    CHOLHDL 23.9 04/19/2024         Review of patient's allergies indicates:   Allergen Reactions    Aspirin Other (See Comments)     Causes bruising          Medication List with Changes/Refills   Current Medications    CHOLECALCIFEROL, VITAMIN D3, (VITAMIN D3) 50 MCG (2,000 UNIT) CAP    Take 1 capsule by mouth once daily.    DICLOFENAC SODIUM (VOLTAREN) 1 % GEL    Apply 2 g topically 2 (two) times daily as needed (hand pain).    FROVATRIPTAN (FROVA) 2.5 MG TABLET    Take 1 tablet (2.5 mg total) by mouth as needed for Migraine. If recurs, may repeat once after 2 hours. Don't exceed 3 per week.    MELOXICAM (MOBIC) 15 MG TABLET    Take 1 tablet (15 mg total) by mouth once daily.    METOPROLOL TARTRATE (LOPRESSOR) 25 MG TABLET    Take 1 tablet (25 mg total) by mouth 2 (two) times daily.    TRIAMCINOLONE ACETONIDE 0.1% (KENALOG) 0.1 % OINTMENT    Apply topically 2 (two) times daily.    VENLAFAXINE (EFFEXOR-XR) 150 MG CP24    Take 1 capsule (150 mg total) by mouth once daily. With 75 mg capsule for total of 225 mg daily    VENLAFAXINE (EFFEXOR-XR) 75 MG 24 HR CAPSULE    TAKE 1 CAPSULE DAILY, ALONG WITH 150 MG CAPSULE FOR TOTAL DAILY DOSE  MG   Changed and/or Refilled Medications    Modified Medication Previous Medication    AMLODIPINE (NORVASC) 2.5 MG TABLET amLODIPine (NORVASC) 10 MG tablet       Take 1 tablet (2.5 mg  "total) by mouth once daily.    Take 1 tablet (10 mg total) by mouth once daily.    ROSUVASTATIN (CRESTOR) 40 MG TAB rosuvastatin (CRESTOR) 40 MG Tab       Take 1 tablet (40 mg total) by mouth every evening.    Take 1 tablet (40 mg total) by mouth every evening.   Discontinued Medications    ATORVASTATIN (LIPITOR) 40 MG TABLET    Take 40 mg by mouth once daily.               Objective:      BP 94/64 (BP Location: Right arm, Patient Position: Sitting)   Pulse 80   Ht 5' 4" (1.626 m)   Wt 62.4 kg (137 lb 9.1 oz)   LMP  (LMP Unknown)   SpO2 98%   BMI 23.61 kg/m²   Estimated body mass index is 23.61 kg/m² as calculated from the following:    Height as of this encounter: 5' 4" (1.626 m).    Weight as of this encounter: 62.4 kg (137 lb 9.1 oz).    Physical Exam  Vitals reviewed.   Constitutional:       Appearance: Normal appearance.   HENT:      Head: Normocephalic and atraumatic.   Eyes:      General: No scleral icterus.     Conjunctiva/sclera: Conjunctivae normal.   Cardiovascular:      Rate and Rhythm: Normal rate.   Pulmonary:      Effort: Pulmonary effort is normal. No respiratory distress.   Musculoskeletal:      Cervical back: Normal range of motion.   Skin:     Coloration: Skin is not pale.   Neurological:      Mental Status: She is alert.   Psychiatric:         Mood and Affect: Mood normal.             Assessment and Plan:   1. Essential hypertension  - amLODIPine (NORVASC) 2.5 MG tablet; Take 1 tablet (2.5 mg total) by mouth once daily.  Dispense: 90 tablet; Refill: 0   Blood pressure was elevated  previously and at the beginning of this year, but has been well-controlled since then.   Current readings are on the lower end, likely contributing to patient's dizziness symptoms.   Decreased amlodipine from 10 mg to 2.5 mg daily to address potential overtreatment.   Instructed patient to take 1/4 of current amlodipine tablet (or 1/2 if cutting into quarters is too difficult) until new prescription is filled.   " Instructed patient to monitor blood pressure twice daily (morning and afternoon, preferably 2 hours after eating) for 2 weeks and log readings to evaluate response to medication adjustment.   Requested patient bring blood pressure cuff to next appointment.    2. Mixed hyperlipidemia  - rosuvastatin (CRESTOR) 40 MG Tab; Take 1 tablet (40 mg total) by mouth every evening.  Dispense: 90 tablet; Refill: 3   Monitored cholesterol which increased from 197 to 286 in 5 months with elevated LDL.   Determined need to switch from atorvastatin to rosuvastatin due to inadequate control.   Clarified the difference between cholesterol and blood pressure medications for patient understanding.   Discontinued atorvastatin and initiated rosuvastatin for cholesterol management.   Provided prescription refill.    3. Dizziness    Noted patient experiences dizziness that are intermittent and do not occur often, especially when standing up, which appears to be a new symptom likely related to low blood pressure from current medication regimen.   Explained the relationship between low blood pressure and dizziness symptoms.   Instructed patient to log any episodes of dizziness, including timing and circumstances.   Recommend staying hydrated.   Advised patient to message directly through patient portal if experiencing dizziness before next appointment.    Assessment & Plan    PLAN SUMMARY:   Decreased amlodipine from 10 mg to 2.5 mg daily   Discontinued atorvastatin patient educated to start rosuvastatin   Initiated rosuvastatin for cholesterol management   Provided prescription refill   Instructed patient to take 1/4 of current amlodipine tablet  until new prescription is filled   Follow-up in 2 weeks to evaluate response to medication adjustment   Ms. Lopez to bring blood pressure cuff to next appointment        ## FOLLOW-UP PLAN:   Scheduled follow up in 2 weeks.         The patient was informed of the following statements     Emergency  Care:Seek immediate medical attention in the emergency room if you experience any new or worsening symptoms, or if your current condition significantly changes or becomes more severe.  Patient Acknowledgment: Patient verbalizes understanding of the plan and agrees to proceed with the recommended care.        Follow Up:  6/25/2025 Juaquin Hernandez PA   Future Appointments   Date Time Provider Department Center   6/25/2025  9:00 AM Juaquin Hernandez PA [facility]   8/5/2025  8:00 AM Lyndsey Monroy MD [facility] DERM [address]   8/8/2025  7:30 AM LAB, [facility] LAB [place]   8/8/2025  7:30 AM SPECIMEN, [facility] LAB [place]   8/15/2025  7:20 AM Marily Junior MD [facility]   6/10/2026 10:00 AM Juaquin Hernandez PA [facility]                     No follow-ups on file.    Other Orders Placed This Visit:  No orders of the defined types were placed in this encounter.        This note was generated with the assistance of ambient listening technology. Verbal consent was obtained by the patient and accompanying visitor(s) for the recording of patient appointment to facilitate this note. I attest to having reviewed and edited the generated note for accuracy, though some syntax or spelling errors may persist. Please contact the author of this note for any clarification.        Juaquin Hernandez PA-C             [1]   Patient Active Problem List  Diagnosis    Chronic headache disorder    Mixed hyperlipidemia    Prediabetes    Osteopenia of multiple sites    Family history of colon cancer in father    Essential hypertension    Bilateral sciatica    Lumbar radiculopathy, chronic    DDD (degenerative disc disease), lumbar    Primary osteoarthritis of both hands    Subclinical hypothyroidism    CKD stage 3a, GFR 45-59 ml/min

## 2025-06-11 NOTE — PATIENT INSTRUCTIONS
Check blood pressure twice a week morning and afternoon.  Measure blood pressure in the morning after waking up and emptying bladder, before any coffee, breakfast or cigarettes if regular smoker. Sit down in a hard chair with a back, feet flat on the floor for 5 min before taking blood pressure reading. Will review blood pressure log at upcoming visit. Contact office if blood pressure very elevated consistently for sooner follow up if necessary.   Nurse visit in two weeks to check. Blood pressure and scheduled labs. Make sure you bring your Blood pressure cuff.  In 4 weeks will have a in person visit to check and see how blood pressure medication is being tolerated.   Will follow up via the portal with any med changes that need to be done.

## 2025-06-25 ENCOUNTER — OFFICE VISIT (OUTPATIENT)
Dept: FAMILY MEDICINE | Facility: CLINIC | Age: 75
End: 2025-06-25
Payer: MEDICARE

## 2025-06-25 VITALS
HEIGHT: 64 IN | DIASTOLIC BLOOD PRESSURE: 72 MMHG | SYSTOLIC BLOOD PRESSURE: 124 MMHG | WEIGHT: 139.75 LBS | HEART RATE: 82 BPM | BODY MASS INDEX: 23.86 KG/M2 | OXYGEN SATURATION: 98 %

## 2025-06-25 DIAGNOSIS — I10 ESSENTIAL HYPERTENSION: Primary | ICD-10-CM

## 2025-06-25 PROCEDURE — 99213 OFFICE O/P EST LOW 20 MIN: CPT | Mod: S$PBB,,,

## 2025-06-25 PROCEDURE — 99999 PR PBB SHADOW E&M-EST. PATIENT-LVL III: CPT | Mod: PBBFAC,,,

## 2025-06-25 PROCEDURE — 99213 OFFICE O/P EST LOW 20 MIN: CPT | Mod: PBBFAC,PO

## 2025-06-25 PROCEDURE — G2211 COMPLEX E/M VISIT ADD ON: HCPCS | Mod: ,,,

## 2025-06-25 NOTE — PROGRESS NOTES
[facility]    6/25/2025      Subjective:       Patient ID:  Maddy is a 74 y.o. female being seen for an established visit.    Chief Complaint: Hypertension          Hypertension  Pertinent negatives include no chest pain or shortness of breath.        Patient is seen in clinic today for follow up of hypertension.  Patient was having episodes of dizziness while on amlodipine 10 mg.  Last visit it was brought to our attention that the elevated blood pressure readings that she put into the were done on a faulty cuff.  Patient was then switched back to her 2.5 mg of amlodipine and blood pressure has been stable since patient in clinic today for follow up.          Review of Systems   Constitutional:  Negative for activity change and appetite change.   HENT:  Negative for voice change.    Respiratory:  Negative for cough, shortness of breath, wheezing and stridor.    Cardiovascular:  Negative for chest pain.   Gastrointestinal:  Negative for abdominal pain, diarrhea, nausea and vomiting.   Genitourinary:  Negative for difficulty urinating, dysuria, frequency, urgency and vaginal discharge.   Musculoskeletal:  Negative for neck stiffness.   Skin:  Negative for color change and pallor.   Allergic/Immunologic: Negative for immunocompromised state.   Neurological:  Negative for dizziness and light-headedness.   Psychiatric/Behavioral:  Negative for agitation, behavioral problems and confusion.          Last HgbA1C:    Lab Results   Component Value Date    HGBA1C 6.1 (H) 04/09/2025    HGBA1C 6.3 (H) 10/21/2024    HGBA1C 6.1 (H) 04/19/2024         Last Lipid Panel:    Lab Results   Component Value Date    HDL 43 04/09/2025    HDL 42 10/21/2024    HDL 47 04/19/2024       Lab Results   Component Value Date    LDLCALC 213.2 (H) 04/09/2025    LDLCALC 123.2 10/21/2024    LDLCALC  "132.0 04/19/2024       Lab Results   Component Value Date    TRIG 149 04/09/2025    TRIG 159 (H) 10/21/2024    TRIG 90 04/19/2024       Lab Results   Component Value Date    CHOLHDL 15.0 (L) 04/09/2025    CHOLHDL 21.3 10/21/2024    CHOLHDL 23.9 04/19/2024               Review of patient's allergies indicates:   Allergen Reactions    Aspirin Other (See Comments)     Causes bruising          Medication List with Changes/Refills   Current Medications    AMLODIPINE (NORVASC) 2.5 MG TABLET    Take 1 tablet (2.5 mg total) by mouth once daily.    CHOLECALCIFEROL, VITAMIN D3, (VITAMIN D3) 50 MCG (2,000 UNIT) CAP    Take 1 capsule by mouth once daily.    DICLOFENAC SODIUM (VOLTAREN) 1 % GEL    Apply 2 g topically 2 (two) times daily as needed (hand pain).    FROVATRIPTAN (FROVA) 2.5 MG TABLET    Take 1 tablet (2.5 mg total) by mouth as needed for Migraine. If recurs, may repeat once after 2 hours. Don't exceed 3 per week.    MELOXICAM (MOBIC) 15 MG TABLET    Take 1 tablet (15 mg total) by mouth once daily.    METOPROLOL TARTRATE (LOPRESSOR) 25 MG TABLET    Take 1 tablet (25 mg total) by mouth 2 (two) times daily.    ROSUVASTATIN (CRESTOR) 40 MG TAB    Take 1 tablet (40 mg total) by mouth every evening.    TRIAMCINOLONE ACETONIDE 0.1% (KENALOG) 0.1 % OINTMENT    Apply topically 2 (two) times daily.    VENLAFAXINE (EFFEXOR-XR) 150 MG CP24    Take 1 capsule (150 mg total) by mouth once daily. With 75 mg capsule for total of 225 mg daily    VENLAFAXINE (EFFEXOR-XR) 75 MG 24 HR CAPSULE    TAKE 1 CAPSULE DAILY, ALONG WITH 150 MG CAPSULE FOR TOTAL DAILY DOSE  MG               Objective:      /72 (BP Location: Left arm, Patient Position: Sitting)   Pulse 82   Ht 5' 4" (1.626 m)   Wt 63.4 kg (139 lb 12.4 oz)   LMP  (LMP Unknown)   SpO2 98%   BMI 23.99 kg/m²   Estimated body mass index is 23.99 kg/m² as calculated from the following:    Height as of this encounter: 5' 4" (1.626 m).    Weight as of this encounter: " 63.4 kg (139 lb 12.4 oz).  Physical Exam  Vitals reviewed.   Constitutional:       Appearance: Normal appearance.   HENT:      Head: Normocephalic and atraumatic.   Eyes:      General: No scleral icterus.     Conjunctiva/sclera: Conjunctivae normal.   Cardiovascular:      Rate and Rhythm: Normal rate.   Pulmonary:      Effort: Pulmonary effort is normal. No respiratory distress.   Musculoskeletal:      Cervical back: Normal range of motion.   Skin:     Coloration: Skin is not pale.   Neurological:      Mental Status: She is alert.   Psychiatric:         Mood and Affect: Mood normal.           Assessment and Plan:     Essential hypertension     Patient tolerating current dose of 2.5 amlodipine and is not having any other symptoms currently.  Blood pressure was measured on her cuff as well as ours and both blood pressures were within acceptable range  Continue current regimen follow up with Dr. Suárez as scheduled in August        Follow Up:  Follow up with Dr. Suárez as scheduled in August for hyperlipidemia and blood pressure       No follow-ups on file.    Other Orders Placed This Visit:  No orders of the defined types were placed in this encounter.        Juaquin Hernandez PA-C

## 2025-07-01 ENCOUNTER — PATIENT MESSAGE (OUTPATIENT)
Dept: FAMILY MEDICINE | Facility: CLINIC | Age: 75
End: 2025-07-01
Payer: MEDICARE

## 2025-07-31 ENCOUNTER — PATIENT OUTREACH (OUTPATIENT)
Dept: ADMINISTRATIVE | Facility: HOSPITAL | Age: 75
End: 2025-07-31
Payer: MEDICARE

## 2025-08-01 DIAGNOSIS — I10 ESSENTIAL HYPERTENSION: ICD-10-CM

## 2025-08-01 NOTE — TELEPHONE ENCOUNTER
No care due was identified.  [facility] Embedded Care Due Messages. Reference number: [id number].   8/01/2025 10:41:01 AM CDT

## 2025-08-04 RX ORDER — AMLODIPINE BESYLATE 2.5 MG/1
2.5 TABLET ORAL DAILY
Qty: 90 TABLET | Refills: 0 | Status: SHIPPED | OUTPATIENT
Start: 2025-08-04

## 2025-08-05 ENCOUNTER — OFFICE VISIT (OUTPATIENT)
Dept: DERMATOLOGY | Facility: CLINIC | Age: 75
End: 2025-08-05
Payer: MEDICARE

## 2025-08-05 DIAGNOSIS — L81.9 HYPERPIGMENTATION: Primary | ICD-10-CM

## 2025-08-05 DIAGNOSIS — L81.1 MELASMA: ICD-10-CM

## 2025-08-05 PROCEDURE — 99213 OFFICE O/P EST LOW 20 MIN: CPT | Mod: PBBFAC | Performed by: STUDENT IN AN ORGANIZED HEALTH CARE EDUCATION/TRAINING PROGRAM

## 2025-08-05 PROCEDURE — 99999 PR PBB SHADOW E&M-EST. PATIENT-LVL III: CPT | Mod: PBBFAC,,, | Performed by: STUDENT IN AN ORGANIZED HEALTH CARE EDUCATION/TRAINING PROGRAM

## 2025-08-05 PROCEDURE — 99213 OFFICE O/P EST LOW 20 MIN: CPT | Mod: S$PBB,,, | Performed by: STUDENT IN AN ORGANIZED HEALTH CARE EDUCATION/TRAINING PROGRAM

## 2025-08-05 RX ORDER — AZELAIC ACID 0.15 G/G
GEL TOPICAL
Qty: 50 G | Refills: 5 | Status: SHIPPED | OUTPATIENT
Start: 2025-08-05

## 2025-08-05 NOTE — PATIENT INSTRUCTIONS
Melasma  - Counseled this is acquired disorder of hyperpigmentation often on sun-exposed areas of face. It is a chronic problem that can flare with sun exposure, pregnancy, OCP, other triggers  - The most important thing is to always wear sunscreen every day SPF 30+ mineral sunscreen and beneficial if it also contains iron oxide (tinted) for ex: Elta MD UV tinted as this protects against visible light. Sun protective clothing (UPF 50) and wide-brimmed hats also beneficial.     Plan:  AM  Start azelaic acid gel 15% (prescription) in morning- if not covered by insurance can purchase from [employer] brand at [facility] or [facility]  Every day wear sunscreen SPF 30+ mineral tinted I.e. La Roche Posay, Neutrogena mineral tinted    PM  - Start triple cream qhs- SM52 hydroquinone 4.5%/ tretinoin 0.025% / fluocinolone 0.01% / niacinamide 4% cream - 30 g tube 2 refills sent from online compounding pharmacy (text or email). Use this medication for 3 months, then take 1 month break before restarting. Stop when clear.    - Hydroquinone can cause paradoxical hyperpigmentation (worsening) if used for extended periods. Do not use more than 3 months at a time.    - Tretinoin can cause skin irritation/dryness. Use moisturizer and hold or discontinue if severe irritation occurs  - Apply moisturizing cream or lotion

## 2025-08-05 NOTE — PROGRESS NOTES
Subjective:      Patient ID:  Maddy Lopez is a 74 y.o. female who presents for   Chief Complaint   Patient presents with    Skin Discoloration     Face and chin      Maddy Lopez is a 74 y.o. female who presents for: skin discoloration     Last office visit 08/28/2023 with Dr. Beckett for psoriasis and hyperpigmentation.     The patient has the following lesions of concern:  Location: face and chin   Duration: 7 months   Symptoms: darkening, worsening   Exacerbating factors: unsure   Relieving factors/Previous treatments: OTC Palmar Skin Tone Cream       Review of Systems   Skin:  Negative for daily sunscreen use, activity-related sunscreen use, recent sunburn and wears hat.   Hematologic/Lymphatic: Does not bruise/bleed easily.       Objective:   Physical Exam   Constitutional: She appears well-developed and well-nourished. No distress.   Neurological: She is alert and oriented to person, place, and time. She is not disoriented.   Psychiatric: She has a normal mood and affect.   Skin:   Areas Examined (abnormalities noted in diagram):   Scalp / Hair Palpated and Inspected            Diagram Legend     Erythematous scaling macule/papule c/w actinic keratosis       Vascular papule c/w angioma      Pigmented verrucoid papule/plaque c/w seborrheic keratosis      Yellow umbilicated papule c/w sebaceous hyperplasia      Irregularly shaped tan macule c/w lentigo     1-2 mm smooth white papules consistent with Milia      Movable subcutaneous cyst with punctum c/w epidermal inclusion cyst      Subcutaneous movable cyst c/w pilar cyst      Firm pink to brown papule c/w dermatofibroma      Pedunculated fleshy papule(s) c/w skin tag(s)      Evenly pigmented macule c/w junctional nevus     Mildly variegated pigmented, slightly irregular-bordered macule c/w mildly atypical nevus      Flesh colored to evenly pigmented papule c/w intradermal nevus       Pink pearly papule/plaque c/w basal cell carcinoma       Erythematous hyperkeratotic cursted plaque c/w SCC      Surgical scar with no sign of skin cancer recurrence      Open and closed comedones      Inflammatory papules and pustules      Verrucoid papule consistent consistent with wart     Erythematous eczematous patches and plaques     Dystrophic onycholytic nail with subungual debris c/w onychomycosis     Umbilicated papule    Erythematous-base heme-crusted tan verrucoid plaque consistent with inflamed seborrheic keratosis     Erythematous Silvery Scaling Plaque c/w Psoriasis     See annotation      Assessment / Plan:        Hyperpigmentation  Melasma  - Counseled this is acquired disorder of hyperpigmentation often on sun-exposed areas of face. It is a chronic problem that can flare with sun exposure, heat, pregnancy, OCP, other triggers  - The most important thing is to always wear sunscreen every day SPF 30+ mineral sunscreen and beneficial if it also contains iron oxide (tinted) for ex: Elta MD UV tinted as this protects against visible light. Sun protective clothing (UPF 50) and wide-brimmed hats also beneficial.     Plan:  AM  Start azelaic acid gel 15% (prescription) in morning- if not covered by insurance can purchase from [facility] at [facility] or [place]  Every day wear sunscreen SPF 30+ mineral tinted I.e. La Roche Posay, Neutrogena mineral tinted    PM  - Start triple cream qhs- SM52 hydroquinone 4.5%/ tretinoin 0.025% / fluocinolone 0.01% / niacinamide 4% cream - 30 g tube 2 refills sent from online compounding pharmacy (text or email). Use this medication for 3 months, then take 1 month break before restarting. Stop when clear.    - Hydroquinone can cause paradoxical hyperpigmentation (worsening) if used for extended periods. Do not use more than 3 months at a time.    - Tretinoin can cause skin irritation/dryness. Use moisturizer and hold or discontinue if severe irritation occurs  - Apply moisturizing cream or lotion    Discussed will start  simple/affordable but many other lightening ingredients available  After 3 mo- need to transition to maintenance therapy should contact office    Rx sent to [facility] via her email provided:  ALBARO [email or web address]    No follow-ups on file.

## 2025-08-08 ENCOUNTER — APPOINTMENT (OUTPATIENT)
Dept: LAB | Facility: HOSPITAL | Age: 75
End: 2025-08-08
Attending: FAMILY MEDICINE
Payer: MEDICARE

## 2025-08-15 ENCOUNTER — TELEPHONE (OUTPATIENT)
Dept: DERMATOLOGY | Facility: CLINIC | Age: 75
End: 2025-08-15
Payer: MEDICARE

## 2025-08-15 PROBLEM — G43.909 MIGRAINE WITHOUT STATUS MIGRAINOSUS, NOT INTRACTABLE: Status: ACTIVE | Noted: 2025-08-15

## 2025-08-16 ENCOUNTER — PATIENT MESSAGE (OUTPATIENT)
Dept: FAMILY MEDICINE | Facility: CLINIC | Age: 75
End: 2025-08-16
Payer: MEDICARE

## 2025-08-20 ENCOUNTER — TELEPHONE (OUTPATIENT)
Dept: DERMATOLOGY | Facility: CLINIC | Age: 75
End: 2025-08-20
Payer: MEDICARE

## 2025-08-22 ENCOUNTER — HOSPITAL ENCOUNTER (OUTPATIENT)
Dept: CARDIOLOGY | Facility: HOSPITAL | Age: 75
Discharge: HOME | End: 2025-08-22
Attending: FAMILY MEDICINE
Payer: MEDICARE

## 2025-08-22 DIAGNOSIS — R09.89 JUGULAR VENOUS DISTENSION (JVD): ICD-10-CM

## 2025-08-22 DIAGNOSIS — I10 ESSENTIAL HYPERTENSION: ICD-10-CM

## 2025-08-22 PROCEDURE — 93306 TTE W/DOPPLER COMPLETE: CPT

## 2025-08-22 PROCEDURE — 93306 TTE W/DOPPLER COMPLETE: CPT | Mod: 26,,, | Performed by: INTERNAL MEDICINE

## 2025-08-23 LAB
APICAL FOUR CHAMBER EJECTION FRACTION: 58 %
APICAL TWO CHAMBER EJECTION FRACTION: 57 %
ASCENDING AORTA: 2.8 CM
AV INDEX (PROSTH): 0.84
AV MEAN GRADIENT: 3 MMHG
AV PEAK GRADIENT: 5 MMHG
AV VALVE AREA BY VELOCITY RATIO: 2.6 CM²
AV VALVE AREA: 2.6 CM²
AV VELOCITY RATIO: 0.82
CV ECHO LV RWT: 0.79 CM
DOP CALC AO PEAK VEL: 1.1 M/S
DOP CALC AO VTI: 24.9 CM
DOP CALC LVOT AREA: 3.1 CM2
DOP CALC LVOT DIAMETER: 2 CM
DOP CALC LVOT PEAK VEL: 0.9 M/S
DOP CALC MV VTI: 29.2 CM
DOP CALCLVOT PEAK VEL VTI: 20.8 CM
E WAVE DECELERATION TIME: 168 MSEC
E/A RATIO: 0.87
E/E' RATIO: 11 M/S
ECHO LV POSTERIOR WALL: 1.3 CM (ref 0.6–1.1)
FRACTIONAL SHORTENING: 33.3 % (ref 28–44)
INTERVENTRICULAR SEPTUM: 1.3 CM (ref 0.6–1.1)
IVC DIAMETER: 1.6 CM
LEFT ATRIUM AREA SYSTOLIC (APICAL 2 CHAMBER): 11.49 CM2
LEFT ATRIUM AREA SYSTOLIC (APICAL 4 CHAMBER): 10.93 CM2
LEFT ATRIUM VOLUME MOD: 24 ML
LEFT INTERNAL DIMENSION IN SYSTOLE: 2.2 CM (ref 2.1–4)
LEFT VENTRICLE DIASTOLIC VOLUME: 45 ML
LEFT VENTRICLE END DIASTOLIC VOLUME APICAL 2 CHAMBER: 60.19 ML
LEFT VENTRICLE END DIASTOLIC VOLUME APICAL 4 CHAMBER: 57.98 ML
LEFT VENTRICLE END SYSTOLIC VOLUME APICAL 2 CHAMBER: 23.84 ML
LEFT VENTRICLE END SYSTOLIC VOLUME APICAL 4 CHAMBER: 21.87 ML
LEFT VENTRICLE SYSTOLIC VOLUME: 17 ML
LEFT VENTRICULAR INTERNAL DIMENSION IN DIASTOLE: 3.3 CM (ref 3.5–6)
LEFT VENTRICULAR MASS: 141.6 G
LV LATERAL E/E' RATIO: 11.3 M/S
LV SEPTAL E/E' RATIO: 11.3 M/S
LVED V (TEICH): 44.6 ML
LVES V (TEICH): 16.52 ML
LVOT MG: 2.01 MMHG
LVOT MV: 0.67 CM/S
MV PEAK A VEL: 0.78 M/S
MV PEAK E VEL: 0.68 M/S
MV PEAK GRADIENT: 4 MMHG
MV STENOSIS PRESSURE HALF TIME: 43.72 MS
MV VALVE AREA BY CONTINUITY EQUATION: 2.24 CM2
MV VALVE AREA P 1/2 METHOD: 5.03 CM2
OHS CV RV/LV RATIO: 0.97 CM
OHS LV EJECTION FRACTION SIMPSONS BIPLANE MOD: 56 %
PISA MRMAX VEL: 5.61 M/S
PISA TR MAX VEL: 1.8 M/S
RA PRESSURE ESTIMATED: 3 MMHG
RA VOL SYS: 23.19 ML
RIGHT ATRIAL AREA: 11.4 CM2
RIGHT ATRIUM VOLUME AREA LENGTH APICAL 4 CHAMBER: 21.98 ML
RIGHT VENTRICLE DIASTOLIC BASEL DIMENSION: 3.2 CM
RV TB RVSP: 5 MMHG
RV TISSUE DOPPLER FREE WALL SYSTOLIC VELOCITY 1 (APICAL 4 CHAMBER VIEW): 8.4 CM/S
SINUS: 3.2 CM
STJ: 2.7 CM
TDI LATERAL: 0.06 M/S
TDI SEPTAL: 0.06 M/S
TDI: 0.06 M/S
TR MAX PG: 13 MMHG
TRICUSPID ANNULAR PLANE SYSTOLIC EXCURSION: 2.8 CM
TV REST PULMONARY ARTERY PRESSURE: 16 MMHG

## 2025-08-25 ENCOUNTER — NUTRITION (OUTPATIENT)
Dept: NUTRITION | Facility: CLINIC | Age: 75
End: 2025-08-25
Payer: MEDICARE

## 2025-08-25 VITALS — BODY MASS INDEX: 23.82 KG/M2 | HEIGHT: 64 IN | WEIGHT: 139.56 LBS

## 2025-08-25 DIAGNOSIS — N18.31 STAGE 3A CHRONIC KIDNEY DISEASE: Primary | ICD-10-CM

## 2025-08-25 DIAGNOSIS — I10 PRIMARY HYPERTENSION: ICD-10-CM

## 2025-08-25 DIAGNOSIS — R73.03 PREDIABETES: ICD-10-CM

## 2025-08-25 DIAGNOSIS — Z71.3 DIETARY COUNSELING: ICD-10-CM

## 2025-08-25 PROCEDURE — 97802 MEDICAL NUTRITION INDIV IN: CPT | Mod: PBBFAC,PO | Performed by: DIETITIAN, REGISTERED

## 2025-08-25 PROCEDURE — 99999PBSHW PR PBB SHADOW TECHNICAL ONLY FILED TO HB: Mod: PBBFAC,,,

## 2025-08-25 PROCEDURE — 99213 OFFICE O/P EST LOW 20 MIN: CPT | Mod: PBBFAC,PO

## 2025-08-25 PROCEDURE — 99999 PR PBB SHADOW E&M-EST. PATIENT-LVL III: CPT | Mod: PBBFAC,,,

## 2025-09-04 ENCOUNTER — OFFICE VISIT (OUTPATIENT)
Dept: DERMATOLOGY | Facility: CLINIC | Age: 75
End: 2025-09-04
Payer: MEDICARE

## 2025-09-04 DIAGNOSIS — L40.9 PSORIASIS: Primary | ICD-10-CM

## 2025-09-04 PROCEDURE — 99999 PR PBB SHADOW E&M-EST. PATIENT-LVL III: CPT | Mod: PBBFAC,,, | Performed by: DERMATOLOGY

## 2025-09-04 PROCEDURE — 11900 INJECT SKIN LESIONS </W 7: CPT | Mod: PBBFAC | Performed by: DERMATOLOGY

## 2025-09-04 PROCEDURE — 99213 OFFICE O/P EST LOW 20 MIN: CPT | Mod: PBBFAC | Performed by: DERMATOLOGY

## 2025-09-04 PROCEDURE — 99999PBSHW PR TRIAMCI2LONE ACETONIDE INJ PER 10MG: Mod: PBBFAC,,,

## 2025-09-04 RX ORDER — TRIAMCINOLONE ACETONIDE 10 MG/ML
10 INJECTION, SUSPENSION INTRA-ARTICULAR; INTRALESIONAL ONCE
Status: SHIPPED | OUTPATIENT
Start: 2025-09-04

## (undated) DEVICE — DRESSING LEUKOPLAST FLEX 1X3IN